# Patient Record
Sex: FEMALE | Race: WHITE | Employment: OTHER | URBAN - METROPOLITAN AREA
[De-identification: names, ages, dates, MRNs, and addresses within clinical notes are randomized per-mention and may not be internally consistent; named-entity substitution may affect disease eponyms.]

---

## 2017-01-10 RX ORDER — CARVEDILOL 25 MG/1
TABLET ORAL
Qty: 90 TABLET | Refills: 3 | Status: SHIPPED | OUTPATIENT
Start: 2017-01-10 | End: 2017-02-20 | Stop reason: SDUPTHER

## 2017-01-10 RX ORDER — SPIRONOLACTONE 25 MG/1
TABLET ORAL
Qty: 90 TABLET | Refills: 3 | Status: SHIPPED | OUTPATIENT
Start: 2017-01-10 | End: 2017-02-20 | Stop reason: SDUPTHER

## 2017-01-10 RX ORDER — LOSARTAN POTASSIUM 50 MG/1
TABLET ORAL
Qty: 180 TABLET | Refills: 3 | Status: SHIPPED | OUTPATIENT
Start: 2017-01-10 | End: 2017-02-20 | Stop reason: SDUPTHER

## 2017-01-26 ENCOUNTER — OFFICE VISIT (OUTPATIENT)
Dept: RHEUMATOLOGY | Age: 72
End: 2017-01-26

## 2017-01-26 VITALS
SYSTOLIC BLOOD PRESSURE: 120 MMHG | WEIGHT: 189.6 LBS | HEIGHT: 64 IN | BODY MASS INDEX: 32.37 KG/M2 | HEART RATE: 84 BPM | DIASTOLIC BLOOD PRESSURE: 70 MMHG

## 2017-01-26 DIAGNOSIS — L40.50 PSORIATIC ARTHRITIS (HCC): Primary | ICD-10-CM

## 2017-01-26 DIAGNOSIS — Z51.11 MAINTENANCE CHEMOTHERAPY: ICD-10-CM

## 2017-01-26 LAB
ALBUMIN SERPL-MCNC: 3.8 G/DL (ref 3.4–5)
ALP BLD-CCNC: 106 U/L (ref 40–129)
ALT SERPL-CCNC: 14 U/L (ref 10–40)
AST SERPL-CCNC: 21 U/L (ref 15–37)
BASOPHILS ABSOLUTE: 0.1 K/UL (ref 0–0.2)
BASOPHILS RELATIVE PERCENT: 1 %
BILIRUB SERPL-MCNC: 0.3 MG/DL (ref 0–1)
BILIRUBIN DIRECT: <0.2 MG/DL (ref 0–0.3)
BILIRUBIN, INDIRECT: NORMAL MG/DL (ref 0–1)
CREAT SERPL-MCNC: 0.6 MG/DL (ref 0.6–1.2)
EOSINOPHILS ABSOLUTE: 0.6 K/UL (ref 0–0.6)
EOSINOPHILS RELATIVE PERCENT: 6.4 %
GFR AFRICAN AMERICAN: >60
GFR NON-AFRICAN AMERICAN: >60
HCT VFR BLD CALC: 36.1 % (ref 36–48)
HEMOGLOBIN: 11.7 G/DL (ref 12–16)
LYMPHOCYTES ABSOLUTE: 1.7 K/UL (ref 1–5.1)
LYMPHOCYTES RELATIVE PERCENT: 18.6 %
MCH RBC QN AUTO: 33.1 PG (ref 26–34)
MCHC RBC AUTO-ENTMCNC: 32.5 G/DL (ref 31–36)
MCV RBC AUTO: 101.7 FL (ref 80–100)
MONOCYTES ABSOLUTE: 0.6 K/UL (ref 0–1.3)
MONOCYTES RELATIVE PERCENT: 7.2 %
NEUTROPHILS ABSOLUTE: 6 K/UL (ref 1.7–7.7)
NEUTROPHILS RELATIVE PERCENT: 66.8 %
PDW BLD-RTO: 15.1 % (ref 12.4–15.4)
PLATELET # BLD: 304 K/UL (ref 135–450)
PMV BLD AUTO: 8.4 FL (ref 5–10.5)
RBC # BLD: 3.55 M/UL (ref 4–5.2)
TOTAL PROTEIN: 6.5 G/DL (ref 6.4–8.2)
WBC # BLD: 8.9 K/UL (ref 4–11)

## 2017-01-26 PROCEDURE — 3017F COLORECTAL CA SCREEN DOC REV: CPT | Performed by: INTERNAL MEDICINE

## 2017-01-26 PROCEDURE — G8400 PT W/DXA NO RESULTS DOC: HCPCS | Performed by: INTERNAL MEDICINE

## 2017-01-26 PROCEDURE — 99214 OFFICE O/P EST MOD 30 MIN: CPT | Performed by: INTERNAL MEDICINE

## 2017-01-26 PROCEDURE — 1123F ACP DISCUSS/DSCN MKR DOCD: CPT | Performed by: INTERNAL MEDICINE

## 2017-01-26 PROCEDURE — G8484 FLU IMMUNIZE NO ADMIN: HCPCS | Performed by: INTERNAL MEDICINE

## 2017-01-26 PROCEDURE — 4040F PNEUMOC VAC/ADMIN/RCVD: CPT | Performed by: INTERNAL MEDICINE

## 2017-01-26 PROCEDURE — 3014F SCREEN MAMMO DOC REV: CPT | Performed by: INTERNAL MEDICINE

## 2017-01-26 PROCEDURE — G8427 DOCREV CUR MEDS BY ELIG CLIN: HCPCS | Performed by: INTERNAL MEDICINE

## 2017-01-26 PROCEDURE — 1036F TOBACCO NON-USER: CPT | Performed by: INTERNAL MEDICINE

## 2017-01-26 PROCEDURE — 1090F PRES/ABSN URINE INCON ASSESS: CPT | Performed by: INTERNAL MEDICINE

## 2017-01-26 PROCEDURE — G8419 CALC BMI OUT NRM PARAM NOF/U: HCPCS | Performed by: INTERNAL MEDICINE

## 2017-01-26 PROCEDURE — G8598 ASA/ANTIPLAT THER USED: HCPCS | Performed by: INTERNAL MEDICINE

## 2017-01-30 ENCOUNTER — HOSPITAL ENCOUNTER (OUTPATIENT)
Dept: MAMMOGRAPHY | Age: 72
Discharge: HOME | End: 2017-01-30
Attending: INTERNAL MEDICINE | Admitting: INTERNAL MEDICINE

## 2017-01-30 DIAGNOSIS — Z12.31 ENCOUNTER FOR SCREENING MAMMOGRAM FOR BREAST CANCER: ICD-10-CM

## 2017-02-20 ENCOUNTER — OFFICE VISIT (OUTPATIENT)
Dept: CARDIOLOGY CLINIC | Age: 72
End: 2017-02-20

## 2017-02-20 VITALS
DIASTOLIC BLOOD PRESSURE: 64 MMHG | OXYGEN SATURATION: 97 % | BODY MASS INDEX: 31.65 KG/M2 | HEART RATE: 70 BPM | WEIGHT: 190 LBS | HEIGHT: 65 IN | SYSTOLIC BLOOD PRESSURE: 118 MMHG

## 2017-02-20 DIAGNOSIS — I10 ESSENTIAL HYPERTENSION: ICD-10-CM

## 2017-02-20 DIAGNOSIS — E78.5 HYPERLIPIDEMIA, UNSPECIFIED HYPERLIPIDEMIA TYPE: ICD-10-CM

## 2017-02-20 DIAGNOSIS — I25.10 CORONARY ARTERY DISEASE INVOLVING NATIVE CORONARY ARTERY OF NATIVE HEART WITHOUT ANGINA PECTORIS: Primary | ICD-10-CM

## 2017-02-20 DIAGNOSIS — I42.9 CARDIOMYOPATHY (HCC): ICD-10-CM

## 2017-02-20 PROCEDURE — 99214 OFFICE O/P EST MOD 30 MIN: CPT | Performed by: INTERNAL MEDICINE

## 2017-02-20 PROCEDURE — 1123F ACP DISCUSS/DSCN MKR DOCD: CPT | Performed by: INTERNAL MEDICINE

## 2017-02-20 PROCEDURE — 3017F COLORECTAL CA SCREEN DOC REV: CPT | Performed by: INTERNAL MEDICINE

## 2017-02-20 PROCEDURE — G8400 PT W/DXA NO RESULTS DOC: HCPCS | Performed by: INTERNAL MEDICINE

## 2017-02-20 PROCEDURE — 3014F SCREEN MAMMO DOC REV: CPT | Performed by: INTERNAL MEDICINE

## 2017-02-20 PROCEDURE — G8598 ASA/ANTIPLAT THER USED: HCPCS | Performed by: INTERNAL MEDICINE

## 2017-02-20 PROCEDURE — G8484 FLU IMMUNIZE NO ADMIN: HCPCS | Performed by: INTERNAL MEDICINE

## 2017-02-20 PROCEDURE — G8427 DOCREV CUR MEDS BY ELIG CLIN: HCPCS | Performed by: INTERNAL MEDICINE

## 2017-02-20 PROCEDURE — 4040F PNEUMOC VAC/ADMIN/RCVD: CPT | Performed by: INTERNAL MEDICINE

## 2017-02-20 PROCEDURE — G8417 CALC BMI ABV UP PARAM F/U: HCPCS | Performed by: INTERNAL MEDICINE

## 2017-02-20 PROCEDURE — 1090F PRES/ABSN URINE INCON ASSESS: CPT | Performed by: INTERNAL MEDICINE

## 2017-02-20 PROCEDURE — 1036F TOBACCO NON-USER: CPT | Performed by: INTERNAL MEDICINE

## 2017-02-20 RX ORDER — GABAPENTIN 100 MG/1
100 CAPSULE ORAL 3 TIMES DAILY
Status: ON HOLD | COMMUNITY
Start: 2017-01-09 | End: 2020-02-11

## 2017-02-20 RX ORDER — CARVEDILOL 25 MG/1
25 TABLET ORAL 2 TIMES DAILY
Qty: 180 TABLET | Refills: 3 | Status: SHIPPED | OUTPATIENT
Start: 2017-02-20 | End: 2017-11-15 | Stop reason: SDUPTHER

## 2017-02-20 RX ORDER — FUROSEMIDE 40 MG/1
40 TABLET ORAL DAILY
Qty: 90 TABLET | Refills: 3 | Status: SHIPPED | OUTPATIENT
Start: 2017-02-20 | End: 2020-03-06 | Stop reason: SDUPTHER

## 2017-02-20 RX ORDER — SPIRONOLACTONE 25 MG/1
25 TABLET ORAL DAILY
Qty: 90 TABLET | Refills: 3 | Status: SHIPPED | OUTPATIENT
Start: 2017-02-20 | End: 2017-11-15 | Stop reason: SDUPTHER

## 2017-02-20 RX ORDER — LOSARTAN POTASSIUM 50 MG/1
50 TABLET ORAL 2 TIMES DAILY
Qty: 180 TABLET | Refills: 3 | Status: SHIPPED | OUTPATIENT
Start: 2017-02-20 | End: 2017-11-15 | Stop reason: SDUPTHER

## 2017-02-28 ENCOUNTER — OFFICE VISIT (OUTPATIENT)
Dept: PULMONOLOGY | Age: 72
End: 2017-02-28

## 2017-02-28 VITALS
BODY MASS INDEX: 32.12 KG/M2 | DIASTOLIC BLOOD PRESSURE: 63 MMHG | WEIGHT: 193 LBS | HEART RATE: 80 BPM | SYSTOLIC BLOOD PRESSURE: 116 MMHG

## 2017-02-28 DIAGNOSIS — I42.9 CARDIOMYOPATHY (HCC): Chronic | ICD-10-CM

## 2017-02-28 DIAGNOSIS — L40.50 PSORIATIC ARTHRITIS (HCC): ICD-10-CM

## 2017-02-28 DIAGNOSIS — J44.9 COPD, SEVERE (HCC): Primary | ICD-10-CM

## 2017-02-28 DIAGNOSIS — I10 ESSENTIAL HYPERTENSION: Chronic | ICD-10-CM

## 2017-02-28 PROCEDURE — G8417 CALC BMI ABV UP PARAM F/U: HCPCS | Performed by: INTERNAL MEDICINE

## 2017-02-28 PROCEDURE — 1123F ACP DISCUSS/DSCN MKR DOCD: CPT | Performed by: INTERNAL MEDICINE

## 2017-02-28 PROCEDURE — G8484 FLU IMMUNIZE NO ADMIN: HCPCS | Performed by: INTERNAL MEDICINE

## 2017-02-28 PROCEDURE — 1090F PRES/ABSN URINE INCON ASSESS: CPT | Performed by: INTERNAL MEDICINE

## 2017-02-28 PROCEDURE — 4040F PNEUMOC VAC/ADMIN/RCVD: CPT | Performed by: INTERNAL MEDICINE

## 2017-02-28 PROCEDURE — 3017F COLORECTAL CA SCREEN DOC REV: CPT | Performed by: INTERNAL MEDICINE

## 2017-02-28 PROCEDURE — 1036F TOBACCO NON-USER: CPT | Performed by: INTERNAL MEDICINE

## 2017-02-28 PROCEDURE — 3014F SCREEN MAMMO DOC REV: CPT | Performed by: INTERNAL MEDICINE

## 2017-02-28 PROCEDURE — 3023F SPIROM DOC REV: CPT | Performed by: INTERNAL MEDICINE

## 2017-02-28 PROCEDURE — G8427 DOCREV CUR MEDS BY ELIG CLIN: HCPCS | Performed by: INTERNAL MEDICINE

## 2017-02-28 PROCEDURE — G8598 ASA/ANTIPLAT THER USED: HCPCS | Performed by: INTERNAL MEDICINE

## 2017-02-28 PROCEDURE — G8926 SPIRO NO PERF OR DOC: HCPCS | Performed by: INTERNAL MEDICINE

## 2017-02-28 PROCEDURE — G8400 PT W/DXA NO RESULTS DOC: HCPCS | Performed by: INTERNAL MEDICINE

## 2017-02-28 PROCEDURE — 99214 OFFICE O/P EST MOD 30 MIN: CPT | Performed by: INTERNAL MEDICINE

## 2017-04-04 ENCOUNTER — OFFICE VISIT (OUTPATIENT)
Dept: PULMONOLOGY | Age: 72
End: 2017-04-04

## 2017-04-04 VITALS
WEIGHT: 188 LBS | DIASTOLIC BLOOD PRESSURE: 72 MMHG | HEART RATE: 95 BPM | BODY MASS INDEX: 31.28 KG/M2 | SYSTOLIC BLOOD PRESSURE: 118 MMHG

## 2017-04-04 DIAGNOSIS — R06.02 SOB (SHORTNESS OF BREATH): ICD-10-CM

## 2017-04-04 DIAGNOSIS — I42.9 CARDIOMYOPATHY (HCC): Chronic | ICD-10-CM

## 2017-04-04 DIAGNOSIS — J44.9 COPD, SEVERE (HCC): Primary | ICD-10-CM

## 2017-04-04 DIAGNOSIS — R05.9 COUGH: ICD-10-CM

## 2017-04-04 DIAGNOSIS — I10 ESSENTIAL HYPERTENSION: Chronic | ICD-10-CM

## 2017-04-04 PROCEDURE — G8400 PT W/DXA NO RESULTS DOC: HCPCS | Performed by: INTERNAL MEDICINE

## 2017-04-04 PROCEDURE — 3017F COLORECTAL CA SCREEN DOC REV: CPT | Performed by: INTERNAL MEDICINE

## 2017-04-04 PROCEDURE — 99214 OFFICE O/P EST MOD 30 MIN: CPT | Performed by: INTERNAL MEDICINE

## 2017-04-04 PROCEDURE — 1123F ACP DISCUSS/DSCN MKR DOCD: CPT | Performed by: INTERNAL MEDICINE

## 2017-04-04 PROCEDURE — 1090F PRES/ABSN URINE INCON ASSESS: CPT | Performed by: INTERNAL MEDICINE

## 2017-04-04 PROCEDURE — G8598 ASA/ANTIPLAT THER USED: HCPCS | Performed by: INTERNAL MEDICINE

## 2017-04-04 PROCEDURE — 3014F SCREEN MAMMO DOC REV: CPT | Performed by: INTERNAL MEDICINE

## 2017-04-04 PROCEDURE — 4040F PNEUMOC VAC/ADMIN/RCVD: CPT | Performed by: INTERNAL MEDICINE

## 2017-04-04 PROCEDURE — G8417 CALC BMI ABV UP PARAM F/U: HCPCS | Performed by: INTERNAL MEDICINE

## 2017-04-04 PROCEDURE — G8926 SPIRO NO PERF OR DOC: HCPCS | Performed by: INTERNAL MEDICINE

## 2017-04-04 PROCEDURE — 3023F SPIROM DOC REV: CPT | Performed by: INTERNAL MEDICINE

## 2017-04-04 PROCEDURE — 1036F TOBACCO NON-USER: CPT | Performed by: INTERNAL MEDICINE

## 2017-04-04 PROCEDURE — G8427 DOCREV CUR MEDS BY ELIG CLIN: HCPCS | Performed by: INTERNAL MEDICINE

## 2017-04-04 RX ORDER — PREDNISONE 10 MG/1
TABLET ORAL
Qty: 30 TABLET | Refills: 0 | Status: SHIPPED | OUTPATIENT
Start: 2017-04-04 | End: 2017-04-14

## 2017-04-04 RX ORDER — DOXYCYCLINE HYCLATE 100 MG/1
100 CAPSULE ORAL DAILY
Qty: 10 CAPSULE | Refills: 3 | Status: SHIPPED | OUTPATIENT
Start: 2017-04-04 | End: 2017-04-14

## 2017-04-28 ENCOUNTER — TELEPHONE (OUTPATIENT)
Dept: PULMONOLOGY | Age: 72
End: 2017-04-28

## 2017-04-28 DIAGNOSIS — R05.9 COUGH: Primary | ICD-10-CM

## 2017-04-29 ENCOUNTER — HOSPITAL ENCOUNTER (OUTPATIENT)
Dept: OTHER | Age: 72
Discharge: HOME | End: 2017-04-29
Attending: INTERNAL MEDICINE | Admitting: INTERNAL MEDICINE

## 2017-04-29 DIAGNOSIS — R05.9 COUGH: ICD-10-CM

## 2017-05-01 ENCOUNTER — TELEPHONE (OUTPATIENT)
Dept: INTERNAL MEDICINE CLINIC | Age: 72
End: 2017-05-01

## 2017-05-22 ENCOUNTER — OFFICE VISIT (OUTPATIENT)
Dept: RHEUMATOLOGY | Age: 72
End: 2017-05-22

## 2017-05-22 VITALS
DIASTOLIC BLOOD PRESSURE: 76 MMHG | BODY MASS INDEX: 30.68 KG/M2 | WEIGHT: 184.13 LBS | SYSTOLIC BLOOD PRESSURE: 124 MMHG | HEART RATE: 88 BPM | HEIGHT: 65 IN

## 2017-05-22 DIAGNOSIS — L40.50 PSORIATIC ARTHRITIS (HCC): Primary | ICD-10-CM

## 2017-05-22 DIAGNOSIS — Z51.81 ENCOUNTER FOR THERAPEUTIC DRUG MONITORING: ICD-10-CM

## 2017-05-22 DIAGNOSIS — Z79.899 ENCOUNTER FOR LONG-TERM (CURRENT) USE OF HIGH-RISK MEDICATION: ICD-10-CM

## 2017-05-22 LAB
ALBUMIN SERPL-MCNC: 3.9 G/DL (ref 3.4–5)
ALP BLD-CCNC: 83 U/L (ref 40–129)
ALT SERPL-CCNC: 14 U/L (ref 10–40)
AST SERPL-CCNC: 24 U/L (ref 15–37)
BASOPHILS ABSOLUTE: 0.1 K/UL (ref 0–0.2)
BASOPHILS RELATIVE PERCENT: 0.6 %
BILIRUB SERPL-MCNC: 0.6 MG/DL (ref 0–1)
BILIRUBIN DIRECT: <0.2 MG/DL (ref 0–0.3)
BILIRUBIN, INDIRECT: NORMAL MG/DL (ref 0–1)
CREAT SERPL-MCNC: 0.6 MG/DL (ref 0.6–1.2)
EOSINOPHILS ABSOLUTE: 0.2 K/UL (ref 0–0.6)
EOSINOPHILS RELATIVE PERCENT: 2.6 %
GFR AFRICAN AMERICAN: >60
GFR NON-AFRICAN AMERICAN: >60
HCT VFR BLD CALC: 36.8 % (ref 36–48)
HEMOGLOBIN: 12 G/DL (ref 12–16)
LYMPHOCYTES ABSOLUTE: 1.3 K/UL (ref 1–5.1)
LYMPHOCYTES RELATIVE PERCENT: 13.2 %
MCH RBC QN AUTO: 32.9 PG (ref 26–34)
MCHC RBC AUTO-ENTMCNC: 32.8 G/DL (ref 31–36)
MCV RBC AUTO: 100.6 FL (ref 80–100)
MONOCYTES ABSOLUTE: 0.5 K/UL (ref 0–1.3)
MONOCYTES RELATIVE PERCENT: 5.1 %
NEUTROPHILS ABSOLUTE: 7.4 K/UL (ref 1.7–7.7)
NEUTROPHILS RELATIVE PERCENT: 78.5 %
PDW BLD-RTO: 16.2 % (ref 12.4–15.4)
PLATELET # BLD: 264 K/UL (ref 135–450)
PMV BLD AUTO: 8.5 FL (ref 5–10.5)
RBC # BLD: 3.66 M/UL (ref 4–5.2)
TOTAL PROTEIN: 6.7 G/DL (ref 6.4–8.2)
WBC # BLD: 9.5 K/UL (ref 4–11)

## 2017-05-22 PROCEDURE — 1123F ACP DISCUSS/DSCN MKR DOCD: CPT | Performed by: INTERNAL MEDICINE

## 2017-05-22 PROCEDURE — 1036F TOBACCO NON-USER: CPT | Performed by: INTERNAL MEDICINE

## 2017-05-22 PROCEDURE — 1090F PRES/ABSN URINE INCON ASSESS: CPT | Performed by: INTERNAL MEDICINE

## 2017-05-22 PROCEDURE — 3014F SCREEN MAMMO DOC REV: CPT | Performed by: INTERNAL MEDICINE

## 2017-05-22 PROCEDURE — 99214 OFFICE O/P EST MOD 30 MIN: CPT | Performed by: INTERNAL MEDICINE

## 2017-05-22 PROCEDURE — 3017F COLORECTAL CA SCREEN DOC REV: CPT | Performed by: INTERNAL MEDICINE

## 2017-05-22 PROCEDURE — G8427 DOCREV CUR MEDS BY ELIG CLIN: HCPCS | Performed by: INTERNAL MEDICINE

## 2017-05-22 PROCEDURE — G8400 PT W/DXA NO RESULTS DOC: HCPCS | Performed by: INTERNAL MEDICINE

## 2017-05-22 PROCEDURE — G8598 ASA/ANTIPLAT THER USED: HCPCS | Performed by: INTERNAL MEDICINE

## 2017-05-22 PROCEDURE — G8417 CALC BMI ABV UP PARAM F/U: HCPCS | Performed by: INTERNAL MEDICINE

## 2017-05-22 PROCEDURE — 4040F PNEUMOC VAC/ADMIN/RCVD: CPT | Performed by: INTERNAL MEDICINE

## 2017-08-23 ENCOUNTER — OFFICE VISIT (OUTPATIENT)
Dept: RHEUMATOLOGY | Age: 72
End: 2017-08-23

## 2017-08-23 VITALS
SYSTOLIC BLOOD PRESSURE: 110 MMHG | DIASTOLIC BLOOD PRESSURE: 64 MMHG | WEIGHT: 178.5 LBS | HEART RATE: 72 BPM | BODY MASS INDEX: 29.74 KG/M2 | HEIGHT: 65 IN

## 2017-08-23 DIAGNOSIS — Z79.899 ENCOUNTER FOR LONG-TERM (CURRENT) USE OF HIGH-RISK MEDICATION: ICD-10-CM

## 2017-08-23 DIAGNOSIS — Z51.81 ENCOUNTER FOR THERAPEUTIC DRUG MONITORING: ICD-10-CM

## 2017-08-23 DIAGNOSIS — L40.50 PSORIATIC ARTHRITIS (HCC): Primary | ICD-10-CM

## 2017-08-23 LAB
ALBUMIN SERPL-MCNC: 3.9 G/DL (ref 3.4–5)
ALP BLD-CCNC: 88 U/L (ref 40–129)
ALT SERPL-CCNC: 14 U/L (ref 10–40)
AST SERPL-CCNC: 24 U/L (ref 15–37)
BASOPHILS ABSOLUTE: 0 K/UL (ref 0–0.2)
BASOPHILS RELATIVE PERCENT: 0.5 %
BILIRUB SERPL-MCNC: 0.4 MG/DL (ref 0–1)
BILIRUBIN DIRECT: <0.2 MG/DL (ref 0–0.3)
BILIRUBIN, INDIRECT: NORMAL MG/DL (ref 0–1)
CREAT SERPL-MCNC: 0.6 MG/DL (ref 0.6–1.2)
EOSINOPHILS ABSOLUTE: 0.5 K/UL (ref 0–0.6)
EOSINOPHILS RELATIVE PERCENT: 6.7 %
GFR AFRICAN AMERICAN: >60
GFR NON-AFRICAN AMERICAN: >60
HCT VFR BLD CALC: 36.5 % (ref 36–48)
HEMOGLOBIN: 12 G/DL (ref 12–16)
LYMPHOCYTES ABSOLUTE: 1.8 K/UL (ref 1–5.1)
LYMPHOCYTES RELATIVE PERCENT: 21.7 %
MCH RBC QN AUTO: 33.7 PG (ref 26–34)
MCHC RBC AUTO-ENTMCNC: 32.9 G/DL (ref 31–36)
MCV RBC AUTO: 102.6 FL (ref 80–100)
MONOCYTES ABSOLUTE: 0.4 K/UL (ref 0–1.3)
MONOCYTES RELATIVE PERCENT: 4.7 %
NEUTROPHILS ABSOLUTE: 5.4 K/UL (ref 1.7–7.7)
NEUTROPHILS RELATIVE PERCENT: 66.4 %
PDW BLD-RTO: 16 % (ref 12.4–15.4)
PLATELET # BLD: 303 K/UL (ref 135–450)
PMV BLD AUTO: 8.7 FL (ref 5–10.5)
RBC # BLD: 3.56 M/UL (ref 4–5.2)
TOTAL PROTEIN: 6.6 G/DL (ref 6.4–8.2)
WBC # BLD: 8.1 K/UL (ref 4–11)

## 2017-08-23 PROCEDURE — 3014F SCREEN MAMMO DOC REV: CPT | Performed by: INTERNAL MEDICINE

## 2017-08-23 PROCEDURE — 3017F COLORECTAL CA SCREEN DOC REV: CPT | Performed by: INTERNAL MEDICINE

## 2017-08-23 PROCEDURE — 1123F ACP DISCUSS/DSCN MKR DOCD: CPT | Performed by: INTERNAL MEDICINE

## 2017-08-23 PROCEDURE — 1090F PRES/ABSN URINE INCON ASSESS: CPT | Performed by: INTERNAL MEDICINE

## 2017-08-23 PROCEDURE — 4040F PNEUMOC VAC/ADMIN/RCVD: CPT | Performed by: INTERNAL MEDICINE

## 2017-08-23 PROCEDURE — G8400 PT W/DXA NO RESULTS DOC: HCPCS | Performed by: INTERNAL MEDICINE

## 2017-08-23 PROCEDURE — G8427 DOCREV CUR MEDS BY ELIG CLIN: HCPCS | Performed by: INTERNAL MEDICINE

## 2017-08-23 PROCEDURE — 99214 OFFICE O/P EST MOD 30 MIN: CPT | Performed by: INTERNAL MEDICINE

## 2017-08-23 PROCEDURE — G8598 ASA/ANTIPLAT THER USED: HCPCS | Performed by: INTERNAL MEDICINE

## 2017-08-23 PROCEDURE — 1036F TOBACCO NON-USER: CPT | Performed by: INTERNAL MEDICINE

## 2017-08-23 PROCEDURE — G8417 CALC BMI ABV UP PARAM F/U: HCPCS | Performed by: INTERNAL MEDICINE

## 2017-08-28 ENCOUNTER — OFFICE VISIT (OUTPATIENT)
Dept: PULMONOLOGY | Age: 72
End: 2017-08-28

## 2017-08-28 VITALS
DIASTOLIC BLOOD PRESSURE: 72 MMHG | WEIGHT: 181 LBS | SYSTOLIC BLOOD PRESSURE: 127 MMHG | BODY MASS INDEX: 30.12 KG/M2 | HEART RATE: 78 BPM

## 2017-08-28 DIAGNOSIS — R05.9 COUGH: ICD-10-CM

## 2017-08-28 DIAGNOSIS — R06.02 SOB (SHORTNESS OF BREATH): Primary | ICD-10-CM

## 2017-08-28 DIAGNOSIS — J44.9 COPD, SEVERE (HCC): ICD-10-CM

## 2017-08-28 DIAGNOSIS — I42.9 CARDIOMYOPATHY, UNSPECIFIED TYPE (HCC): Chronic | ICD-10-CM

## 2017-08-28 PROCEDURE — 1090F PRES/ABSN URINE INCON ASSESS: CPT | Performed by: INTERNAL MEDICINE

## 2017-08-28 PROCEDURE — G8427 DOCREV CUR MEDS BY ELIG CLIN: HCPCS | Performed by: INTERNAL MEDICINE

## 2017-08-28 PROCEDURE — 4040F PNEUMOC VAC/ADMIN/RCVD: CPT | Performed by: INTERNAL MEDICINE

## 2017-08-28 PROCEDURE — 3017F COLORECTAL CA SCREEN DOC REV: CPT | Performed by: INTERNAL MEDICINE

## 2017-08-28 PROCEDURE — 1123F ACP DISCUSS/DSCN MKR DOCD: CPT | Performed by: INTERNAL MEDICINE

## 2017-08-28 PROCEDURE — 3023F SPIROM DOC REV: CPT | Performed by: INTERNAL MEDICINE

## 2017-08-28 PROCEDURE — G8926 SPIRO NO PERF OR DOC: HCPCS | Performed by: INTERNAL MEDICINE

## 2017-08-28 PROCEDURE — G8417 CALC BMI ABV UP PARAM F/U: HCPCS | Performed by: INTERNAL MEDICINE

## 2017-08-28 PROCEDURE — 99214 OFFICE O/P EST MOD 30 MIN: CPT | Performed by: INTERNAL MEDICINE

## 2017-08-28 PROCEDURE — G8598 ASA/ANTIPLAT THER USED: HCPCS | Performed by: INTERNAL MEDICINE

## 2017-08-28 PROCEDURE — 1036F TOBACCO NON-USER: CPT | Performed by: INTERNAL MEDICINE

## 2017-08-28 PROCEDURE — 3014F SCREEN MAMMO DOC REV: CPT | Performed by: INTERNAL MEDICINE

## 2017-08-28 PROCEDURE — G8400 PT W/DXA NO RESULTS DOC: HCPCS | Performed by: INTERNAL MEDICINE

## 2017-08-28 RX ORDER — VENLAFAXINE HYDROCHLORIDE 75 MG/1
1 CAPSULE, EXTENDED RELEASE ORAL DAILY
COMMUNITY
Start: 2017-08-02 | End: 2019-03-12 | Stop reason: SDUPTHER

## 2017-10-11 ENCOUNTER — TELEPHONE (OUTPATIENT)
Dept: INTERNAL MEDICINE CLINIC | Age: 72
End: 2017-10-11

## 2017-10-11 ENCOUNTER — OFFICE VISIT (OUTPATIENT)
Dept: RHEUMATOLOGY | Age: 72
End: 2017-10-11

## 2017-10-11 VITALS
HEART RATE: 84 BPM | HEIGHT: 65 IN | SYSTOLIC BLOOD PRESSURE: 110 MMHG | WEIGHT: 181 LBS | BODY MASS INDEX: 30.16 KG/M2 | DIASTOLIC BLOOD PRESSURE: 68 MMHG

## 2017-10-11 DIAGNOSIS — M65.9 FLEXOR TENOSYNOVITIS OF FINGER: Primary | ICD-10-CM

## 2017-10-11 DIAGNOSIS — L40.50 PSORIATIC ARTHRITIS (HCC): ICD-10-CM

## 2017-10-11 PROCEDURE — 1036F TOBACCO NON-USER: CPT | Performed by: INTERNAL MEDICINE

## 2017-10-11 PROCEDURE — 20550 NJX 1 TENDON SHEATH/LIGAMENT: CPT | Performed by: INTERNAL MEDICINE

## 2017-10-11 RX ORDER — TRIAMCINOLONE ACETONIDE 40 MG/ML
20 INJECTION, SUSPENSION INTRA-ARTICULAR; INTRAMUSCULAR ONCE
Status: COMPLETED | OUTPATIENT
Start: 2017-10-11 | End: 2017-10-11

## 2017-10-11 RX ADMIN — TRIAMCINOLONE ACETONIDE 20 MG: 40 INJECTION, SUSPENSION INTRA-ARTICULAR; INTRAMUSCULAR at 12:14

## 2017-10-11 NOTE — TELEPHONE ENCOUNTER
Pt is calling stating she was suppose to call if her finger started locking up again. She would like to come in asap to get an injection in her ring finger. She is wanting to come soon.

## 2017-11-15 DIAGNOSIS — I25.10 CORONARY ARTERY DISEASE INVOLVING NATIVE CORONARY ARTERY OF NATIVE HEART WITHOUT ANGINA PECTORIS: ICD-10-CM

## 2017-11-15 DIAGNOSIS — I10 ESSENTIAL HYPERTENSION: ICD-10-CM

## 2017-11-15 DIAGNOSIS — I42.9 CARDIOMYOPATHY (HCC): ICD-10-CM

## 2017-11-16 RX ORDER — LOSARTAN POTASSIUM 50 MG/1
TABLET ORAL
Qty: 180 TABLET | Refills: 2 | Status: SHIPPED | OUTPATIENT
Start: 2017-11-16 | End: 2018-03-07 | Stop reason: SDUPTHER

## 2017-11-16 RX ORDER — SPIRONOLACTONE 25 MG/1
25 TABLET ORAL DAILY
Qty: 90 TABLET | Refills: 3 | Status: SHIPPED | OUTPATIENT
Start: 2017-11-16 | End: 2018-03-07 | Stop reason: SDUPTHER

## 2017-11-16 RX ORDER — CARVEDILOL 25 MG/1
TABLET ORAL
Qty: 90 TABLET | Refills: 3 | Status: SHIPPED | OUTPATIENT
Start: 2017-11-16 | End: 2018-03-07 | Stop reason: SDUPTHER

## 2017-11-27 ENCOUNTER — OFFICE VISIT (OUTPATIENT)
Dept: RHEUMATOLOGY | Age: 72
End: 2017-11-27

## 2017-11-27 VITALS
DIASTOLIC BLOOD PRESSURE: 62 MMHG | SYSTOLIC BLOOD PRESSURE: 110 MMHG | HEIGHT: 65 IN | HEART RATE: 80 BPM | WEIGHT: 182.38 LBS | BODY MASS INDEX: 30.39 KG/M2

## 2017-11-27 DIAGNOSIS — L40.50 PSORIATIC ARTHRITIS (HCC): Primary | ICD-10-CM

## 2017-11-27 DIAGNOSIS — Z51.81 ENCOUNTER FOR THERAPEUTIC DRUG MONITORING: ICD-10-CM

## 2017-11-27 DIAGNOSIS — Z79.899 ENCOUNTER FOR LONG-TERM (CURRENT) USE OF HIGH-RISK MEDICATION: ICD-10-CM

## 2017-11-27 LAB
ALBUMIN SERPL-MCNC: 4 G/DL (ref 3.4–5)
ALP BLD-CCNC: 83 U/L (ref 40–129)
ALT SERPL-CCNC: 12 U/L (ref 10–40)
AST SERPL-CCNC: 20 U/L (ref 15–37)
BASOPHILS ABSOLUTE: 0 K/UL (ref 0–0.2)
BASOPHILS RELATIVE PERCENT: 0.4 %
BILIRUB SERPL-MCNC: 0.6 MG/DL (ref 0–1)
BILIRUBIN DIRECT: <0.2 MG/DL (ref 0–0.3)
BILIRUBIN, INDIRECT: NORMAL MG/DL (ref 0–1)
CREAT SERPL-MCNC: 0.6 MG/DL (ref 0.6–1.2)
EOSINOPHILS ABSOLUTE: 0.2 K/UL (ref 0–0.6)
EOSINOPHILS RELATIVE PERCENT: 2.8 %
GFR AFRICAN AMERICAN: >60
GFR NON-AFRICAN AMERICAN: >60
HCT VFR BLD CALC: 37.9 % (ref 36–48)
HEMOGLOBIN: 12.2 G/DL (ref 12–16)
LYMPHOCYTES ABSOLUTE: 1.2 K/UL (ref 1–5.1)
LYMPHOCYTES RELATIVE PERCENT: 15.9 %
MCH RBC QN AUTO: 33.7 PG (ref 26–34)
MCHC RBC AUTO-ENTMCNC: 32.1 G/DL (ref 31–36)
MCV RBC AUTO: 104.8 FL (ref 80–100)
MONOCYTES ABSOLUTE: 0.5 K/UL (ref 0–1.3)
MONOCYTES RELATIVE PERCENT: 6.4 %
NEUTROPHILS ABSOLUTE: 5.5 K/UL (ref 1.7–7.7)
NEUTROPHILS RELATIVE PERCENT: 74.5 %
PDW BLD-RTO: 16.1 % (ref 12.4–15.4)
PLATELET # BLD: 287 K/UL (ref 135–450)
PMV BLD AUTO: 8.4 FL (ref 5–10.5)
RBC # BLD: 3.62 M/UL (ref 4–5.2)
TOTAL PROTEIN: 6.5 G/DL (ref 6.4–8.2)
WBC # BLD: 7.4 K/UL (ref 4–11)

## 2017-11-27 PROCEDURE — 4040F PNEUMOC VAC/ADMIN/RCVD: CPT | Performed by: INTERNAL MEDICINE

## 2017-11-27 PROCEDURE — G8598 ASA/ANTIPLAT THER USED: HCPCS | Performed by: INTERNAL MEDICINE

## 2017-11-27 PROCEDURE — G8482 FLU IMMUNIZE ORDER/ADMIN: HCPCS | Performed by: INTERNAL MEDICINE

## 2017-11-27 PROCEDURE — 1123F ACP DISCUSS/DSCN MKR DOCD: CPT | Performed by: INTERNAL MEDICINE

## 2017-11-27 PROCEDURE — G8427 DOCREV CUR MEDS BY ELIG CLIN: HCPCS | Performed by: INTERNAL MEDICINE

## 2017-11-27 PROCEDURE — 3017F COLORECTAL CA SCREEN DOC REV: CPT | Performed by: INTERNAL MEDICINE

## 2017-11-27 PROCEDURE — 1036F TOBACCO NON-USER: CPT | Performed by: INTERNAL MEDICINE

## 2017-11-27 PROCEDURE — 3014F SCREEN MAMMO DOC REV: CPT | Performed by: INTERNAL MEDICINE

## 2017-11-27 PROCEDURE — G8400 PT W/DXA NO RESULTS DOC: HCPCS | Performed by: INTERNAL MEDICINE

## 2017-11-27 PROCEDURE — 1090F PRES/ABSN URINE INCON ASSESS: CPT | Performed by: INTERNAL MEDICINE

## 2017-11-27 PROCEDURE — 99214 OFFICE O/P EST MOD 30 MIN: CPT | Performed by: INTERNAL MEDICINE

## 2017-11-27 PROCEDURE — G8417 CALC BMI ABV UP PARAM F/U: HCPCS | Performed by: INTERNAL MEDICINE

## 2017-11-27 NOTE — PROGRESS NOTES
SUBJECTIVE:    Patient ID: Naldo White is a 67 y.o. female. The patient returns for follow-up of psoriatic arthritis. The injection she received along her flexor tendon last visit was quite helpful. She continues on methotrexate 20 mg weekly. Review of Systems:    Respiratory: denies cough, denies shortness of breath  Gastrointestinal: denies abdominal pain, denies nausea  Musculoskeletal:                 Less than 5 minutes        Morning stiffness  Ears, nose, mouth: denies mucosal sores  Skin: Minimal psoriasis on the hands, denies alopecia. The remainder of her review of systems is negative. Prior to Visit Medications    Medication Sig Taking? Authorizing Provider   carvedilol (COREG) 25 MG tablet TAKE ONE-HALF TABLET BY  MOUTH TWICE A DAY Yes Sia Martinez MD   losartan (COZAAR) 50 MG tablet TAKE 1 TABLET BY MOUTH TWO  TIMES DAILY Yes Sia Martinez MD   spironolactone (ALDACTONE) 25 MG tablet TAKE 1 TABLET BY MOUTH  DAILY Yes Sia Martinez MD   metFORMIN (GLUCOPHAGE) 1000 MG tablet Take 1 tablet by mouth 2 times daily Yes Historical Provider, MD   venlafaxine (EFFEXOR XR) 75 MG extended release capsule Take 1 capsule by mouth daily Yes Historical Provider, MD   methotrexate (RHEUMATREX) 2.5 MG chemo tablet Take 8 tablets by mouth  once a week Yes Dontae Corrales MD   gabapentin (NEURONTIN) 100 MG capsule Take 100 mg by mouth 3 times daily Yes Historical Provider, MD   furosemide (LASIX) 40 MG tablet Take 1 tablet by mouth daily Yes Sia Martinez MD   umeclidinium-vilanterol (ANORO ELLIPTA) 62.5-25 MCG/INH AEPB inhaler Inhale 1 puff into the lungs daily Yes Go Cameron MD   folic acid (FOLVITE) 1 MG tablet Take 1 mg by mouth daily Yes Historical Provider, MD   aspirin 81 MG tablet Take 81 mg by mouth every other day  Yes Historical Provider, MD   HYDROcodone-acetaminophen (NORCO) 5-325 MG per tablet Take 1 tablet by mouth every 6 hours as needed for Pain.  Yes Historical Provider, MD venlafaxine (EFFEXOR) 75 MG tablet Take 75 mg by mouth daily. Yes Historical Provider, MD   diclofenac sodium 1 % GEL Apply 4 g topically 4 times daily. Yes Moustapha Kelly MD   Insulin Glargine (LANTUS SC) Inject 25 Units into the skin 2 times daily. Yes Historical Provider, MD   Insulin Aspart (NOVOLOG FLEXPEN SC) Inject  into the skin. Yes Historical Provider, MD   levothyroxine (SYNTHROID) 100 MCG tablet Take 75 mcg by mouth daily  Yes Historical Provider, MD   estrogens, conjugated, (PREMARIN) 0.625 MG tablet Take 0.625 mg by mouth every 72 hours. Yes Historical Provider, MD   calcium carbonate (OSCAL) 500 MG TABS tablet Take 500 mg by mouth daily. Yes Historical Provider, MD   perphenazine 2 MG tablet Take 2 mg by mouth nightly. Yes Historical Provider, MD        OBJECTIVE:  /62   Pulse 80   Ht 5' 5\" (1.651 m)   Wt 182 lb 6 oz (82.7 kg)   BMI 30.35 kg/m²      Physical Exam:    General: the patient demonstrated normal gait and posture without evidence of overt muscle wasting. Hygiene appears normal    Ears, mouth, nose, throat: no mucosal sores      Respiratory: assessment of the patient's respiratory effort showed no evidence of abnormal respiration and no use of accessory muscles. Diaphragmatic movement is normal.  Percussion of the patient's chest showed no evidence of dullness flatness or hyperresonance. Auscultation of the patient's lungs reveal normal breath sounds in all lung fields. There is no evidence of abnormal sounds such as rales or wheezes. There is no evidence of friction rub. Cardiovascular: palpation of the patient's chest revealed no abnormal thrill. The point of maximal impulse showed no displacement. Auscultation of the heart revealed no evidence of murmur gallop or abnormal heart sound.     Musculoskeletal: Soft tissue swelling first MCP on the left soft tissue swelling 50 on the left flexion contracture third PIP on the right no swelling wrists no swelling

## 2018-02-27 ENCOUNTER — OFFICE VISIT (OUTPATIENT)
Dept: RHEUMATOLOGY | Age: 73
End: 2018-02-27

## 2018-02-27 VITALS
HEART RATE: 84 BPM | BODY MASS INDEX: 29.89 KG/M2 | DIASTOLIC BLOOD PRESSURE: 64 MMHG | HEIGHT: 65 IN | WEIGHT: 179.38 LBS | SYSTOLIC BLOOD PRESSURE: 108 MMHG

## 2018-02-27 DIAGNOSIS — Z79.899 ENCOUNTER FOR LONG-TERM (CURRENT) USE OF HIGH-RISK MEDICATION: ICD-10-CM

## 2018-02-27 DIAGNOSIS — L40.50 PSORIATIC ARTHRITIS (HCC): Primary | ICD-10-CM

## 2018-02-27 DIAGNOSIS — Z51.81 ENCOUNTER FOR THERAPEUTIC DRUG MONITORING: ICD-10-CM

## 2018-02-27 LAB
ALBUMIN SERPL-MCNC: 4.2 G/DL (ref 3.4–5)
ALP BLD-CCNC: 91 U/L (ref 40–129)
ALT SERPL-CCNC: 15 U/L (ref 10–40)
AST SERPL-CCNC: 21 U/L (ref 15–37)
BILIRUB SERPL-MCNC: 0.8 MG/DL (ref 0–1)
BILIRUBIN DIRECT: <0.2 MG/DL (ref 0–0.3)
BILIRUBIN, INDIRECT: NORMAL MG/DL (ref 0–1)
CREAT SERPL-MCNC: 0.6 MG/DL (ref 0.6–1.2)
GFR AFRICAN AMERICAN: >60
GFR NON-AFRICAN AMERICAN: >60
TOTAL PROTEIN: 6.9 G/DL (ref 6.4–8.2)

## 2018-02-27 PROCEDURE — G8417 CALC BMI ABV UP PARAM F/U: HCPCS | Performed by: INTERNAL MEDICINE

## 2018-02-27 PROCEDURE — 4040F PNEUMOC VAC/ADMIN/RCVD: CPT | Performed by: INTERNAL MEDICINE

## 2018-02-27 PROCEDURE — G8598 ASA/ANTIPLAT THER USED: HCPCS | Performed by: INTERNAL MEDICINE

## 2018-02-27 PROCEDURE — 1036F TOBACCO NON-USER: CPT | Performed by: INTERNAL MEDICINE

## 2018-02-27 PROCEDURE — G8482 FLU IMMUNIZE ORDER/ADMIN: HCPCS | Performed by: INTERNAL MEDICINE

## 2018-02-27 PROCEDURE — G8427 DOCREV CUR MEDS BY ELIG CLIN: HCPCS | Performed by: INTERNAL MEDICINE

## 2018-02-27 PROCEDURE — 1090F PRES/ABSN URINE INCON ASSESS: CPT | Performed by: INTERNAL MEDICINE

## 2018-02-27 PROCEDURE — 1123F ACP DISCUSS/DSCN MKR DOCD: CPT | Performed by: INTERNAL MEDICINE

## 2018-02-27 PROCEDURE — 99214 OFFICE O/P EST MOD 30 MIN: CPT | Performed by: INTERNAL MEDICINE

## 2018-02-27 PROCEDURE — 3017F COLORECTAL CA SCREEN DOC REV: CPT | Performed by: INTERNAL MEDICINE

## 2018-02-27 PROCEDURE — 3014F SCREEN MAMMO DOC REV: CPT | Performed by: INTERNAL MEDICINE

## 2018-02-27 PROCEDURE — G8400 PT W/DXA NO RESULTS DOC: HCPCS | Performed by: INTERNAL MEDICINE

## 2018-02-27 NOTE — PROGRESS NOTES
hours as needed for Pain. Yes Historical Provider, MD   venlafaxine (EFFEXOR) 75 MG tablet Take 75 mg by mouth daily. Yes Historical Provider, MD   diclofenac sodium 1 % GEL Apply 4 g topically 4 times daily. Yes Becca Rapp MD   Insulin Glargine (LANTUS SC) Inject 25 Units into the skin 2 times daily. Yes Historical Provider, MD   Insulin Aspart (NOVOLOG FLEXPEN SC) Inject  into the skin. Yes Historical Provider, MD   levothyroxine (SYNTHROID) 100 MCG tablet Take 75 mcg by mouth daily  Yes Historical Provider, MD   estrogens, conjugated, (PREMARIN) 0.625 MG tablet Take 0.625 mg by mouth every 72 hours. Yes Historical Provider, MD   calcium carbonate (OSCAL) 500 MG TABS tablet Take 500 mg by mouth daily. Yes Historical Provider, MD   perphenazine 2 MG tablet Take 2 mg by mouth nightly. Yes Historical Provider, MD        OBJECTIVE:  /64   Pulse 84   Ht 5' 5\" (1.651 m)   Wt 179 lb 6 oz (81.4 kg)   BMI 29.85 kg/m²      Physical Exam:    General: the patient demonstrated normal gait and posture without evidence of overt muscle wasting. Hygiene appears normal    Ears, mouth, nose, throat: no mucosal sores      Respiratory: assessment of the patient's respiratory effort showed no evidence of abnormal respiration and no use of accessory muscles. Diaphragmatic movement is normal.  Percussion of the patient's chest showed no evidence of dullness flatness or hyperresonance. Auscultation of the patient's lungs reveal normal breath sounds in all lung fields. There is no evidence of abnormal sounds such as rales or wheezes. There is no evidence of friction rub. Cardiovascular: palpation of the patient's chest revealed no abnormal thrill. The point of maximal impulse showed no displacement. Auscultation of the heart revealed no evidence of murmur gallop or abnormal heart sound. Musculoskeletal: Trace swelling wrists no swelling PIPs no swelling elbows equivocal swelling knees.   Fists 100%  Skin: no visible psoriasis    ASSESSMENT: Psoriatic arthritis. Chemotherapy        PLAN: The patient will reduce methotrexate to 15 mg a week. Blood work will be obtained today to monitor for adverse drug reactions and to screen for tuberculosis. She'll review literature on Enbrel and Humira and she will call the office week or 2 if she wants to proceed with a TNF inhibitor. I'll see her back in 3 months time.

## 2018-02-28 LAB
BASOPHILS ABSOLUTE: 0 K/UL (ref 0–0.2)
BASOPHILS RELATIVE PERCENT: 0.6 %
EOSINOPHILS ABSOLUTE: 0.3 K/UL (ref 0–0.6)
EOSINOPHILS RELATIVE PERCENT: 5.4 %
HCT VFR BLD CALC: 38.6 % (ref 36–48)
HEMOGLOBIN: 13 G/DL (ref 12–16)
LYMPHOCYTES ABSOLUTE: 1.1 K/UL (ref 1–5.1)
LYMPHOCYTES RELATIVE PERCENT: 19.6 %
MCH RBC QN AUTO: 34 PG (ref 26–34)
MCHC RBC AUTO-ENTMCNC: 33.8 G/DL (ref 31–36)
MCV RBC AUTO: 100.7 FL (ref 80–100)
MONOCYTES ABSOLUTE: 0.3 K/UL (ref 0–1.3)
MONOCYTES RELATIVE PERCENT: 4.8 %
NEUTROPHILS ABSOLUTE: 4 K/UL (ref 1.7–7.7)
NEUTROPHILS RELATIVE PERCENT: 69.6 %
PDW BLD-RTO: 16.8 % (ref 12.4–15.4)
PLATELET # BLD: 364 K/UL (ref 135–450)
PMV BLD AUTO: 8.6 FL (ref 5–10.5)
RBC # BLD: 3.83 M/UL (ref 4–5.2)
WBC # BLD: 5.7 K/UL (ref 4–11)

## 2018-03-02 ENCOUNTER — OFFICE VISIT (OUTPATIENT)
Dept: PULMONOLOGY | Age: 73
End: 2018-03-02

## 2018-03-02 VITALS
SYSTOLIC BLOOD PRESSURE: 105 MMHG | DIASTOLIC BLOOD PRESSURE: 63 MMHG | OXYGEN SATURATION: 95 % | HEART RATE: 87 BPM | BODY MASS INDEX: 29.95 KG/M2 | WEIGHT: 180 LBS

## 2018-03-02 DIAGNOSIS — R06.02 SOB (SHORTNESS OF BREATH): Primary | ICD-10-CM

## 2018-03-02 DIAGNOSIS — R05.9 COUGH: ICD-10-CM

## 2018-03-02 DIAGNOSIS — J44.9 COPD, SEVERE (HCC): ICD-10-CM

## 2018-03-02 DIAGNOSIS — I25.5 ISCHEMIC CARDIOMYOPATHY: Chronic | ICD-10-CM

## 2018-03-02 PROCEDURE — G8417 CALC BMI ABV UP PARAM F/U: HCPCS | Performed by: INTERNAL MEDICINE

## 2018-03-02 PROCEDURE — 1090F PRES/ABSN URINE INCON ASSESS: CPT | Performed by: INTERNAL MEDICINE

## 2018-03-02 PROCEDURE — 1123F ACP DISCUSS/DSCN MKR DOCD: CPT | Performed by: INTERNAL MEDICINE

## 2018-03-02 PROCEDURE — 1036F TOBACCO NON-USER: CPT | Performed by: INTERNAL MEDICINE

## 2018-03-02 PROCEDURE — 4040F PNEUMOC VAC/ADMIN/RCVD: CPT | Performed by: INTERNAL MEDICINE

## 2018-03-02 PROCEDURE — 3014F SCREEN MAMMO DOC REV: CPT | Performed by: INTERNAL MEDICINE

## 2018-03-02 PROCEDURE — G8400 PT W/DXA NO RESULTS DOC: HCPCS | Performed by: INTERNAL MEDICINE

## 2018-03-02 PROCEDURE — 99214 OFFICE O/P EST MOD 30 MIN: CPT | Performed by: INTERNAL MEDICINE

## 2018-03-02 PROCEDURE — G8427 DOCREV CUR MEDS BY ELIG CLIN: HCPCS | Performed by: INTERNAL MEDICINE

## 2018-03-02 PROCEDURE — G8926 SPIRO NO PERF OR DOC: HCPCS | Performed by: INTERNAL MEDICINE

## 2018-03-02 PROCEDURE — G8482 FLU IMMUNIZE ORDER/ADMIN: HCPCS | Performed by: INTERNAL MEDICINE

## 2018-03-02 PROCEDURE — 3023F SPIROM DOC REV: CPT | Performed by: INTERNAL MEDICINE

## 2018-03-02 PROCEDURE — 3017F COLORECTAL CA SCREEN DOC REV: CPT | Performed by: INTERNAL MEDICINE

## 2018-03-02 PROCEDURE — G8598 ASA/ANTIPLAT THER USED: HCPCS | Performed by: INTERNAL MEDICINE

## 2018-03-03 LAB
QUANTIFERON (R) TB GOLD (INCUBATED): NEGATIVE
QUANTIFERON MITOGEN: >10 IU/ML
QUANTIFERON NIL: 0.09 IU/ML
QUANTIFERON TB AG MINUS NIL: 0 IU/ML (ref 0–0.34)

## 2018-03-07 ENCOUNTER — OFFICE VISIT (OUTPATIENT)
Dept: CARDIOLOGY CLINIC | Age: 73
End: 2018-03-07

## 2018-03-07 VITALS
DIASTOLIC BLOOD PRESSURE: 56 MMHG | HEIGHT: 65 IN | HEART RATE: 92 BPM | SYSTOLIC BLOOD PRESSURE: 120 MMHG | WEIGHT: 179 LBS | BODY MASS INDEX: 29.82 KG/M2

## 2018-03-07 DIAGNOSIS — R06.02 SOB (SHORTNESS OF BREATH): ICD-10-CM

## 2018-03-07 DIAGNOSIS — I42.9 CARDIOMYOPATHY, UNSPECIFIED TYPE (HCC): ICD-10-CM

## 2018-03-07 DIAGNOSIS — I10 ESSENTIAL HYPERTENSION: Chronic | ICD-10-CM

## 2018-03-07 DIAGNOSIS — E78.5 HYPERLIPIDEMIA, UNSPECIFIED HYPERLIPIDEMIA TYPE: Chronic | ICD-10-CM

## 2018-03-07 DIAGNOSIS — I25.5 ISCHEMIC CARDIOMYOPATHY: Chronic | ICD-10-CM

## 2018-03-07 DIAGNOSIS — I25.10 CORONARY ARTERY DISEASE INVOLVING NATIVE CORONARY ARTERY OF NATIVE HEART WITHOUT ANGINA PECTORIS: Primary | Chronic | ICD-10-CM

## 2018-03-07 PROCEDURE — 1036F TOBACCO NON-USER: CPT | Performed by: INTERNAL MEDICINE

## 2018-03-07 PROCEDURE — 4040F PNEUMOC VAC/ADMIN/RCVD: CPT | Performed by: INTERNAL MEDICINE

## 2018-03-07 PROCEDURE — 3017F COLORECTAL CA SCREEN DOC REV: CPT | Performed by: INTERNAL MEDICINE

## 2018-03-07 PROCEDURE — G8482 FLU IMMUNIZE ORDER/ADMIN: HCPCS | Performed by: INTERNAL MEDICINE

## 2018-03-07 PROCEDURE — 1090F PRES/ABSN URINE INCON ASSESS: CPT | Performed by: INTERNAL MEDICINE

## 2018-03-07 PROCEDURE — 99214 OFFICE O/P EST MOD 30 MIN: CPT | Performed by: INTERNAL MEDICINE

## 2018-03-07 PROCEDURE — G8427 DOCREV CUR MEDS BY ELIG CLIN: HCPCS | Performed by: INTERNAL MEDICINE

## 2018-03-07 PROCEDURE — 1123F ACP DISCUSS/DSCN MKR DOCD: CPT | Performed by: INTERNAL MEDICINE

## 2018-03-07 PROCEDURE — G8400 PT W/DXA NO RESULTS DOC: HCPCS | Performed by: INTERNAL MEDICINE

## 2018-03-07 PROCEDURE — 3014F SCREEN MAMMO DOC REV: CPT | Performed by: INTERNAL MEDICINE

## 2018-03-07 PROCEDURE — G8598 ASA/ANTIPLAT THER USED: HCPCS | Performed by: INTERNAL MEDICINE

## 2018-03-07 PROCEDURE — G8417 CALC BMI ABV UP PARAM F/U: HCPCS | Performed by: INTERNAL MEDICINE

## 2018-03-07 RX ORDER — CARVEDILOL 25 MG/1
TABLET ORAL
Qty: 90 TABLET | Refills: 3 | Status: SHIPPED | OUTPATIENT
Start: 2018-03-07 | End: 2019-07-21 | Stop reason: SDUPTHER

## 2018-03-07 RX ORDER — SPIRONOLACTONE 25 MG/1
25 TABLET ORAL DAILY
Qty: 90 TABLET | Refills: 3 | Status: SHIPPED | OUTPATIENT
Start: 2018-03-07 | End: 2019-07-21 | Stop reason: SDUPTHER

## 2018-03-07 RX ORDER — LOSARTAN POTASSIUM 50 MG/1
TABLET ORAL
Qty: 180 TABLET | Refills: 3 | Status: SHIPPED | OUTPATIENT
Start: 2018-03-07 | End: 2019-07-22 | Stop reason: SDUPTHER

## 2018-03-07 NOTE — LETTER
[address] [address] [address] [address]  Phone: [phone]  Fax: [phone]    Alexandra Gannon MD        March 8, 2018     Silvino Burrows MD  [address] [place] [address]    Patient: Mirian Morales  MR Number: [id number]  YOB: 1945  Date of Visit: 3/7/2018    Dear Dr. Silvino Burrows:      Siena Teran       Referring Provider:  Silvino Burrows MD     Chief Complaint   Patient presents with    Coronary Artery Disease     No cardiac complaints      History of Present Illness:  Mrs. Jeani Schirmer is here today for a yearly follow up of her CAD, HTN, HLD, CM. She is past smoker. She  sees Dr. Perlita Taveras for COPD. She feels she is doing well. Stable dyspnea. No chest pain, tightness or pressure. Tolerating meds. Very excited about new grandchildren. Past Medical History:   has a past medical history of CAD (coronary artery disease); Cancer ([address] Cardiomyopathy [facility]); [facility] Hyperlipidemia; Hypertension; Hypothyroidism; and Psoriasis. Surgical History:   has a past surgical history that includes bladder repair; Hysterectomy; malignant skin lesion excision; and shoulder surgery (03/2013). Social History:   reports that she quit smoking about 26 years ago. She has never used smokeless tobacco. She reports that she drinks alcohol. She reports that she does not use drugs. Family History:  family history includes Arthritis in her sister; Cancer in her mother; Heart Disease in her maternal grandmother and paternal grandmother. Home Medications:  Prior to Visit Medications    Medication Sig Taking?  Authorizing Provider   carvedilol (COREG) 25 MG tablet TAKE ONE-HALF TABLET BY  MOUTH TWICE A DAY Yes Alexandra Gannon MD   losartan (COZAAR) 50 MG tablet TAKE 1 TABLET BY MOUTH TWO  TIMES DAILY Yes Alexandra Gannon MD   spironolactone (ALDACTONE) 25 MG tablet TAKE 1 TABLET BY MOUTH  DAILY Yes ROS:  [x]Full ROS obtained and negative except as mentioned in HPI      Physical Examination:    Vitals:    03/07/18 0922   BP: (!) 120/56   Pulse: 92        · GENERAL: Well developed, well nourished, No acute distress  · NEUROLOGICAL: Alert and oriented, no motor abnormalities  · PSYCH: Calm affect  · SKIN: Warm and dry--healed stapled area to right shoulder  · HEENT: Normocephalic, Sclera non-icteric, mucus membranes moist  · NECK: supple, JVP normal  · CAROTID: Normal upstroke, no bruits  · CARDIAC: Normal PMI, regular rate and rhythm, normal S1S2, no murmur, rub, or gallop  · RESPIRATORY: Limited air movement, Minimal wheeze  · EXTREMITIES: No edema  · MUSCULOSKELETAL: No joint swelling or tenderness, no chest wall tenderness  · GASTROINTESTINAL: normal bowel sounds, soft, non-tender, no bruit    Assessment:     1. CAD (coronary artery disease): Stable cardiac status   Continue medical management  Cath 2001> Minimal LAD (30% first diagonal), medical management. 2. HTN (hypertension)   Controlled  BP (!) 120/56 (Site: Left Arm, Position: Sitting, Cuff Size: Medium Adult)   Pulse 92   Ht 5' 5\" (1.651 m)   Wt 179 lb (81.2 kg)   Breastfeeding? No   BMI 29.79 kg/m²       3. Cardiomyopathy:   Stable  Continue coreg and losartan   ECHO 2009> Trace MR and VA  EF 50%  ECHO  8/14/15> EF 45-50%   4. SOB:  Stable, seeing Pulmonary  Severe copd by pft's -- Follows with Dr. Ofelia Servin. Uses inhalers which has helped her breathing. ECHO 8/14/15> Mild global hk, ef 45-50%  Chest x-ray 1/15> Normal  CT chest 12/14> Opacities   5. Rheumatoid arthritis: On methotrexate, stable  6. Hyperlipemia: Statin intolerant      Stable cardiac status  F/u 1 year  ECHO if symptoms change    Thank you for allowing me to participate in the care of this individual.      Ca Hernandez M.D., [facility] [place]           If you have questions, please do not hesitate to call me. I look forward to following Amor Burgos along with you.     Sincerely,

## 2018-03-08 NOTE — COMMUNICATION BODY
[address] [facility]       Referring Provider:  Sugey Chacon MD     Chief Complaint   Patient presents with    Coronary Artery Disease     No cardiac complaints      History of Present Illness:  Mrs. Ronaldo Jackson is here today for a yearly follow up of her CAD, HTN, HLD, CM. She is past smoker. She  sees Dr. Daphney Rodgers for COPD. She feels she is doing well. Stable dyspnea. No chest pain, tightness or pressure. Tolerating meds. Very excited about new grandchildren. Past Medical History:   has a past medical history of CAD (coronary artery disease); [facility]); CHF (congestive heart failure) ([facility] Hyperlipidemia; Hypertension; Hypothyroidism; and Psoriasis. Surgical History:   has a past surgical history that includes bladder repair; Hysterectomy; malignant skin lesion excision; and shoulder surgery (03/2013). Social History:   reports that she quit smoking about 26 years ago. She has never used smokeless tobacco. She reports that she drinks alcohol. She reports that she does not use drugs. Family History:  family history includes Arthritis in her sister; Cancer in her mother; Heart Disease in her maternal grandmother and paternal grandmother. Home Medications:  Prior to Visit Medications    Medication Sig Taking?  Authorizing Provider   carvedilol (COREG) 25 MG tablet TAKE ONE-HALF TABLET BY  MOUTH TWICE A DAY Yes Татьяна Chaves MD   losartan (COZAAR) 50 MG tablet TAKE 1 TABLET BY MOUTH TWO  TIMES DAILY Yes Татьяна Chaves MD   spironolactone (ALDACTONE) 25 MG tablet TAKE 1 TABLET BY MOUTH  DAILY Yes Татьяна Chaves MD   metFORMIN (GLUCOPHAGE) 1000 MG tablet Take 1 tablet by mouth 2 times daily Yes Historical Provider, MD   venlafaxine (EFFEXOR XR) 75 MG extended release capsule Take 1 capsule by mouth daily Yes Historical Provider, MD   methotrexate (RHEUMATREX) 2.5 MG chemo tablet Take 8 tablets by mouth  once a week Yes Desi Gomez MD   gabapentin (NEURONTIN) non-icteric, mucus membranes moist  · NECK: supple, JVP normal  · CAROTID: Normal upstroke, no bruits  · CARDIAC: Normal PMI, regular rate and rhythm, normal S1S2, no murmur, rub, or gallop  · RESPIRATORY: Limited air movement, Minimal wheeze  · EXTREMITIES: No edema  · MUSCULOSKELETAL: No joint swelling or tenderness, no chest wall tenderness  · GASTROINTESTINAL: normal bowel sounds, soft, non-tender, no bruit    Assessment:     1. CAD (coronary artery disease): Stable cardiac status   Continue medical management  Cath 2001> Minimal LAD (30% first diagonal), medical management. 2. HTN (hypertension)   Controlled  BP (!) 120/56 (Site: Left Arm, Position: Sitting, Cuff Size: Medium Adult)   Pulse 92   Ht 5' 5\" (1.651 m)   Wt 179 lb (81.2 kg)   Breastfeeding? No   BMI 29.79 kg/m²       3. Cardiomyopathy:   Stable  Continue coreg and losartan   ECHO 2009> Trace MR and MS  EF 50%  ECHO  8/14/15> EF 45-50%   4. SOB:  Stable, seeing Pulmonary  Severe copd by pft's -- Follows with Dr. Daphney Rodgers. Uses inhalers which has helped her breathing. ECHO 8/14/15> Mild global hk, ef 45-50%  Chest x-ray 1/15> Normal  CT chest 12/14> Opacities   5. Rheumatoid arthritis: On methotrexate, stable  6.   Hyperlipemia: Statin intolerant      Stable cardiac status  F/u 1 year  ECHO if symptoms change    Thank you for allowing me to participate in the care of this individual.      Narda Davenport M.D., [facility]

## 2018-03-09 ENCOUNTER — TELEPHONE (OUTPATIENT)
Dept: CARDIOLOGY CLINIC | Age: 73
End: 2018-03-09

## 2018-03-09 ENCOUNTER — HOSPITAL ENCOUNTER (OUTPATIENT)
Dept: PULMONOLOGY | Age: 73
Discharge: HOME | End: 2018-03-09
Attending: INTERNAL MEDICINE | Admitting: INTERNAL MEDICINE

## 2018-03-09 VITALS — OXYGEN SATURATION: 98 %

## 2018-03-09 DIAGNOSIS — J44.9 CHRONIC OBSTRUCTIVE PULMONARY DISEASE (HCC): ICD-10-CM

## 2018-03-09 DIAGNOSIS — J44.9 COPD, SEVERE (HCC): ICD-10-CM

## 2018-03-09 RX ORDER — ALBUTEROL SULFATE 90 UG/1
4 AEROSOL, METERED RESPIRATORY (INHALATION) ONCE
Status: COMPLETED | OUTPATIENT
Start: 2018-03-09 | End: 2018-03-09

## 2018-03-09 RX ADMIN — ALBUTEROL SULFATE 4 PUFF: 90 AEROSOL, METERED RESPIRATORY (INHALATION) at 14:30

## 2018-03-13 ENCOUNTER — TELEPHONE (OUTPATIENT)
Dept: PULMONOLOGY | Age: 73
End: 2018-03-13

## 2018-03-13 NOTE — TELEPHONE ENCOUNTER
PT called in wanting to go over the results of her PFT. PT also wondering if she can have one of the inhalers they use for the PFT test to carry around with her as well.      PT call back number [phone]

## 2018-04-03 ENCOUNTER — HOSPITAL ENCOUNTER (OUTPATIENT)
Dept: OTHER | Age: 73
Discharge: HOME | End: 2018-04-03
Attending: INTERNAL MEDICINE | Admitting: INTERNAL MEDICINE

## 2018-04-03 DIAGNOSIS — I10 ESSENTIAL HYPERTENSION: Chronic | ICD-10-CM

## 2018-04-03 LAB
ANION GAP SERPL CALCULATED.3IONS-SCNC: 15 MMOL/L (ref 3–16)
BUN BLDV-MCNC: 8 MG/DL (ref 7–20)
CALCIUM SERPL-MCNC: 9.2 MG/DL (ref 8.3–10.6)
CHLORIDE BLD-SCNC: 99 MMOL/L (ref 99–110)
CO2: 24 MMOL/L (ref 21–32)
CREAT SERPL-MCNC: 0.7 MG/DL (ref 0.6–1.2)
GFR AFRICAN AMERICAN: >60
GFR NON-AFRICAN AMERICAN: >60
GLUCOSE BLD-MCNC: 218 MG/DL (ref 70–99)
POTASSIUM SERPL-SCNC: 4.8 MMOL/L (ref 3.5–5.1)
SODIUM BLD-SCNC: 138 MMOL/L (ref 136–145)

## 2018-05-01 ENCOUNTER — TELEPHONE (OUTPATIENT)
Dept: INTERNAL MEDICINE CLINIC | Age: 73
End: 2018-05-01

## 2018-05-11 ENCOUNTER — TELEPHONE (OUTPATIENT)
Dept: RHEUMATOLOGY | Age: 73
End: 2018-05-11

## 2018-05-29 ENCOUNTER — OFFICE VISIT (OUTPATIENT)
Dept: RHEUMATOLOGY | Age: 73
End: 2018-05-29

## 2018-05-29 VITALS
SYSTOLIC BLOOD PRESSURE: 124 MMHG | BODY MASS INDEX: 30.18 KG/M2 | HEIGHT: 65 IN | HEART RATE: 80 BPM | WEIGHT: 181.13 LBS | DIASTOLIC BLOOD PRESSURE: 70 MMHG

## 2018-05-29 DIAGNOSIS — Z79.899 ENCOUNTER FOR LONG-TERM (CURRENT) USE OF HIGH-RISK MEDICATION: ICD-10-CM

## 2018-05-29 DIAGNOSIS — L40.50 PSORIATIC ARTHRITIS (HCC): Primary | ICD-10-CM

## 2018-05-29 DIAGNOSIS — Z51.81 ENCOUNTER FOR THERAPEUTIC DRUG MONITORING: ICD-10-CM

## 2018-05-29 LAB
BASOPHILS ABSOLUTE: 0 K/UL (ref 0–0.2)
BASOPHILS RELATIVE PERCENT: 0.6 %
EOSINOPHILS ABSOLUTE: 0.2 K/UL (ref 0–0.6)
EOSINOPHILS RELATIVE PERCENT: 3.6 %
HCT VFR BLD CALC: 36.4 % (ref 36–48)
HEMOGLOBIN: 12.3 G/DL (ref 12–16)
LYMPHOCYTES ABSOLUTE: 1.3 K/UL (ref 1–5.1)
LYMPHOCYTES RELATIVE PERCENT: 21 %
MCH RBC QN AUTO: 33.7 PG (ref 26–34)
MCHC RBC AUTO-ENTMCNC: 33.6 G/DL (ref 31–36)
MCV RBC AUTO: 100.1 FL (ref 80–100)
MONOCYTES ABSOLUTE: 0.3 K/UL (ref 0–1.3)
MONOCYTES RELATIVE PERCENT: 5.3 %
NEUTROPHILS ABSOLUTE: 4.2 K/UL (ref 1.7–7.7)
NEUTROPHILS RELATIVE PERCENT: 69.5 %
PDW BLD-RTO: 16.6 % (ref 12.4–15.4)
PLATELET # BLD: 309 K/UL (ref 135–450)
PMV BLD AUTO: 8.2 FL (ref 5–10.5)
RBC # BLD: 3.64 M/UL (ref 4–5.2)
WBC # BLD: 6.1 K/UL (ref 4–11)

## 2018-05-29 PROCEDURE — 1123F ACP DISCUSS/DSCN MKR DOCD: CPT | Performed by: INTERNAL MEDICINE

## 2018-05-29 PROCEDURE — G8598 ASA/ANTIPLAT THER USED: HCPCS | Performed by: INTERNAL MEDICINE

## 2018-05-29 PROCEDURE — 1090F PRES/ABSN URINE INCON ASSESS: CPT | Performed by: INTERNAL MEDICINE

## 2018-05-29 PROCEDURE — 1036F TOBACCO NON-USER: CPT | Performed by: INTERNAL MEDICINE

## 2018-05-29 PROCEDURE — 4040F PNEUMOC VAC/ADMIN/RCVD: CPT | Performed by: INTERNAL MEDICINE

## 2018-05-29 PROCEDURE — 3017F COLORECTAL CA SCREEN DOC REV: CPT | Performed by: INTERNAL MEDICINE

## 2018-05-29 PROCEDURE — 99214 OFFICE O/P EST MOD 30 MIN: CPT | Performed by: INTERNAL MEDICINE

## 2018-05-29 PROCEDURE — G8400 PT W/DXA NO RESULTS DOC: HCPCS | Performed by: INTERNAL MEDICINE

## 2018-05-29 PROCEDURE — G8417 CALC BMI ABV UP PARAM F/U: HCPCS | Performed by: INTERNAL MEDICINE

## 2018-05-29 PROCEDURE — G8427 DOCREV CUR MEDS BY ELIG CLIN: HCPCS | Performed by: INTERNAL MEDICINE

## 2018-05-30 LAB
ALBUMIN SERPL-MCNC: 4.1 G/DL (ref 3.4–5)
ALP BLD-CCNC: 78 U/L (ref 40–129)
ALT SERPL-CCNC: 14 U/L (ref 10–40)
AST SERPL-CCNC: 25 U/L (ref 15–37)
BILIRUB SERPL-MCNC: 0.5 MG/DL (ref 0–1)
BILIRUBIN DIRECT: <0.2 MG/DL (ref 0–0.3)
BILIRUBIN, INDIRECT: NORMAL MG/DL (ref 0–1)
CREAT SERPL-MCNC: 0.5 MG/DL (ref 0.6–1.2)
GFR AFRICAN AMERICAN: >60
GFR NON-AFRICAN AMERICAN: >60
TOTAL PROTEIN: 6.6 G/DL (ref 6.4–8.2)

## 2018-08-21 ENCOUNTER — HOSPITAL ENCOUNTER (OUTPATIENT)
Dept: OTHER | Age: 73
Discharge: HOME | End: 2018-08-21
Attending: INTERNAL MEDICINE | Admitting: INTERNAL MEDICINE

## 2018-08-21 LAB
ESTIMATED AVERAGE GLUCOSE: 174.3 MG/DL
HBA1C MFR BLD: 7.7 %

## 2018-08-29 ENCOUNTER — OFFICE VISIT (OUTPATIENT)
Dept: RHEUMATOLOGY | Age: 73
End: 2018-08-29

## 2018-08-29 VITALS
DIASTOLIC BLOOD PRESSURE: 72 MMHG | BODY MASS INDEX: 30.34 KG/M2 | WEIGHT: 182.13 LBS | HEIGHT: 65 IN | SYSTOLIC BLOOD PRESSURE: 118 MMHG | HEART RATE: 80 BPM

## 2018-08-29 DIAGNOSIS — Z79.899 ENCOUNTER FOR LONG-TERM (CURRENT) USE OF HIGH-RISK MEDICATION: ICD-10-CM

## 2018-08-29 DIAGNOSIS — L40.50 PSORIATIC ARTHRITIS (HCC): Primary | ICD-10-CM

## 2018-08-29 DIAGNOSIS — Z51.81 ENCOUNTER FOR THERAPEUTIC DRUG MONITORING: ICD-10-CM

## 2018-08-29 LAB
ALBUMIN SERPL-MCNC: 3.9 G/DL (ref 3.4–5)
ALP BLD-CCNC: 79 U/L (ref 40–129)
ALT SERPL-CCNC: 19 U/L (ref 10–40)
AST SERPL-CCNC: 32 U/L (ref 15–37)
BASOPHILS ABSOLUTE: 0 K/UL (ref 0–0.2)
BASOPHILS RELATIVE PERCENT: 0.4 %
BILIRUB SERPL-MCNC: 0.5 MG/DL (ref 0–1)
BILIRUBIN DIRECT: <0.2 MG/DL (ref 0–0.3)
BILIRUBIN, INDIRECT: NORMAL MG/DL (ref 0–1)
CREAT SERPL-MCNC: 0.6 MG/DL (ref 0.6–1.2)
EOSINOPHILS ABSOLUTE: 0.2 K/UL (ref 0–0.6)
EOSINOPHILS RELATIVE PERCENT: 2.8 %
GFR AFRICAN AMERICAN: >60
GFR NON-AFRICAN AMERICAN: >60
HCT VFR BLD CALC: 35.5 % (ref 36–48)
HEMOGLOBIN: 11.7 G/DL (ref 12–16)
LYMPHOCYTES ABSOLUTE: 1.2 K/UL (ref 1–5.1)
LYMPHOCYTES RELATIVE PERCENT: 19.1 %
MCH RBC QN AUTO: 33.8 PG (ref 26–34)
MCHC RBC AUTO-ENTMCNC: 33 G/DL (ref 31–36)
MCV RBC AUTO: 102.7 FL (ref 80–100)
MONOCYTES ABSOLUTE: 0.4 K/UL (ref 0–1.3)
MONOCYTES RELATIVE PERCENT: 6.6 %
NEUTROPHILS ABSOLUTE: 4.4 K/UL (ref 1.7–7.7)
NEUTROPHILS RELATIVE PERCENT: 71.1 %
PDW BLD-RTO: 16.7 % (ref 12.4–15.4)
PLATELET # BLD: 268 K/UL (ref 135–450)
PMV BLD AUTO: 8.2 FL (ref 5–10.5)
RBC # BLD: 3.46 M/UL (ref 4–5.2)
TOTAL PROTEIN: 6.4 G/DL (ref 6.4–8.2)
WBC # BLD: 6.2 K/UL (ref 4–11)

## 2018-08-29 PROCEDURE — G8598 ASA/ANTIPLAT THER USED: HCPCS | Performed by: INTERNAL MEDICINE

## 2018-08-29 PROCEDURE — G8417 CALC BMI ABV UP PARAM F/U: HCPCS | Performed by: INTERNAL MEDICINE

## 2018-08-29 PROCEDURE — 99214 OFFICE O/P EST MOD 30 MIN: CPT | Performed by: INTERNAL MEDICINE

## 2018-08-29 PROCEDURE — 3017F COLORECTAL CA SCREEN DOC REV: CPT | Performed by: INTERNAL MEDICINE

## 2018-08-29 PROCEDURE — 1036F TOBACCO NON-USER: CPT | Performed by: INTERNAL MEDICINE

## 2018-08-29 PROCEDURE — G8400 PT W/DXA NO RESULTS DOC: HCPCS | Performed by: INTERNAL MEDICINE

## 2018-08-29 PROCEDURE — 4040F PNEUMOC VAC/ADMIN/RCVD: CPT | Performed by: INTERNAL MEDICINE

## 2018-08-29 PROCEDURE — 1090F PRES/ABSN URINE INCON ASSESS: CPT | Performed by: INTERNAL MEDICINE

## 2018-08-29 PROCEDURE — 1123F ACP DISCUSS/DSCN MKR DOCD: CPT | Performed by: INTERNAL MEDICINE

## 2018-08-29 PROCEDURE — 1101F PT FALLS ASSESS-DOCD LE1/YR: CPT | Performed by: INTERNAL MEDICINE

## 2018-08-29 PROCEDURE — G8427 DOCREV CUR MEDS BY ELIG CLIN: HCPCS | Performed by: INTERNAL MEDICINE

## 2018-08-29 RX ORDER — ACETAMINOPHEN 500 MG
500 TABLET ORAL EVERY 6 HOURS PRN
COMMUNITY

## 2018-08-29 RX ORDER — MELOXICAM 7.5 MG/1
7.5 TABLET ORAL DAILY
COMMUNITY
End: 2018-08-29 | Stop reason: SDUPTHER

## 2018-08-29 RX ORDER — MELOXICAM 7.5 MG/1
7.5 TABLET ORAL DAILY
Qty: 30 TABLET | Refills: 2 | Status: SHIPPED | OUTPATIENT
Start: 2018-08-29 | End: 2018-12-03 | Stop reason: SDUPTHER

## 2018-08-29 NOTE — PROGRESS NOTES
Yes Historical Provider, MD   aspirin 81 MG tablet Take 81 mg by mouth every other day  Yes Historical Provider, MD   HYDROcodone-acetaminophen (NORCO) 5-325 MG per tablet Take 1 tablet by mouth every 6 hours as needed for Pain. Yes Historical Provider, MD   venlafaxine (EFFEXOR) 75 MG tablet Take 75 mg by mouth daily. Yes Historical Provider, MD   diclofenac sodium 1 % GEL Apply 4 g topically 4 times daily. Yes Scott Adams MD   Insulin Glargine (LANTUS SC) Inject 25 Units into the skin 2 times daily. Yes Historical MD Penelope   Insulin Aspart (NOVOLOG FLEXPEN SC) Inject  into the skin. Yes Historical Provider, MD   levothyroxine (SYNTHROID) 100 MCG tablet Take 75 mcg by mouth daily  Yes Historical Provider, MD   estrogens, conjugated, (PREMARIN) 0.625 MG tablet Take 0.625 mg by mouth every 72 hours. Yes Historical Provider, MD   calcium carbonate (OSCAL) 500 MG TABS tablet Take 500 mg by mouth daily. Yes Historical Provider, MD   perphenazine 2 MG tablet Take 2 mg by mouth nightly. Yes Historical Provider, MD        OBJECTIVE:  /72   Pulse 80   Ht 5' 5\" (1.651 m)   Wt 182 lb 2 oz (82.6 kg)   BMI 30.31 kg/m²      Physical Exam:    General: the patient demonstrated normal gait and posture without evidence of overt muscle wasting. Hygiene appears normal    Ears, mouth, nose, throat: no mucosal sores      Respiratory: assessment of the patient's respiratory effort showed no evidence of abnormal respiration and no use of accessory muscles. Diaphragmatic movement is normal.  Percussion of the patient's chest showed no evidence of dullness flatness or hyperresonance. Auscultation of the patient's lungs reveal normal breath sounds in all lung fields. There is no evidence of abnormal sounds such as rales or wheezes. There is no evidence of friction rub. Cardiovascular: palpation of the patient's chest revealed no abnormal thrill. The point of maximal impulse showed no displacement.

## 2018-09-04 ENCOUNTER — OFFICE VISIT (OUTPATIENT)
Dept: PULMONOLOGY | Age: 73
End: 2018-09-04

## 2018-09-04 VITALS
WEIGHT: 184 LBS | OXYGEN SATURATION: 97 % | BODY MASS INDEX: 30.62 KG/M2 | HEART RATE: 71 BPM | SYSTOLIC BLOOD PRESSURE: 132 MMHG | DIASTOLIC BLOOD PRESSURE: 69 MMHG

## 2018-09-04 DIAGNOSIS — J44.9 COPD, SEVERE (HCC): ICD-10-CM

## 2018-09-04 DIAGNOSIS — R06.02 SOB (SHORTNESS OF BREATH): Primary | ICD-10-CM

## 2018-09-04 DIAGNOSIS — I10 ESSENTIAL HYPERTENSION: Chronic | ICD-10-CM

## 2018-09-04 DIAGNOSIS — I25.5 ISCHEMIC CARDIOMYOPATHY: Chronic | ICD-10-CM

## 2018-09-04 PROCEDURE — G8427 DOCREV CUR MEDS BY ELIG CLIN: HCPCS | Performed by: INTERNAL MEDICINE

## 2018-09-04 PROCEDURE — 1101F PT FALLS ASSESS-DOCD LE1/YR: CPT | Performed by: INTERNAL MEDICINE

## 2018-09-04 PROCEDURE — G8417 CALC BMI ABV UP PARAM F/U: HCPCS | Performed by: INTERNAL MEDICINE

## 2018-09-04 PROCEDURE — G8598 ASA/ANTIPLAT THER USED: HCPCS | Performed by: INTERNAL MEDICINE

## 2018-09-04 PROCEDURE — 1036F TOBACCO NON-USER: CPT | Performed by: INTERNAL MEDICINE

## 2018-09-04 PROCEDURE — G8400 PT W/DXA NO RESULTS DOC: HCPCS | Performed by: INTERNAL MEDICINE

## 2018-09-04 PROCEDURE — 3023F SPIROM DOC REV: CPT | Performed by: INTERNAL MEDICINE

## 2018-09-04 PROCEDURE — 1123F ACP DISCUSS/DSCN MKR DOCD: CPT | Performed by: INTERNAL MEDICINE

## 2018-09-04 PROCEDURE — 1090F PRES/ABSN URINE INCON ASSESS: CPT | Performed by: INTERNAL MEDICINE

## 2018-09-04 PROCEDURE — 99213 OFFICE O/P EST LOW 20 MIN: CPT | Performed by: INTERNAL MEDICINE

## 2018-09-04 PROCEDURE — 3017F COLORECTAL CA SCREEN DOC REV: CPT | Performed by: INTERNAL MEDICINE

## 2018-09-04 PROCEDURE — G8926 SPIRO NO PERF OR DOC: HCPCS | Performed by: INTERNAL MEDICINE

## 2018-09-04 PROCEDURE — 4040F PNEUMOC VAC/ADMIN/RCVD: CPT | Performed by: INTERNAL MEDICINE

## 2018-09-04 NOTE — PROGRESS NOTES
[facility]  Progress Note  MD Luke Anguianomacie Quinteros   YOB: 1945    Date of Visit:  9/4/2018    Assessment/Plan:  1. SOB (shortness of breath) & 2. Cough  No recent flare up  Feels this has improved with time and exercise. 3. COPD, severe ([address]  PFT 3/18:  Spirometry reveals decreased FVC at 2.11 liters, which is 69% predicted. FEV1 is decreased at 1.05 liters, which is 46% predicted. FEV1/FVC ratio  is reduced at 50%. There is significant improvement in FEV1 after inhaled  bronchodilators. Lung volumes reveal increased total lung capacity at 138%  predicted. Residual volume is increased at 234% predicted. Diffusion  capacity is mildly reduced. In comparison to a study from 07/2015,  spirometry is similar, but air trapping and hyperinflation have worsened.     IMPRESSION:  Severe obstructive lung disease with hyperinflation. There  was a good response to inhaled bronchodilators. Hyperinflation has  worsened since the preceding study. Continue Anoro  I have independently reviewed pulmonary function testing. Recent testing shows severe disease and worsening hyperinflation. I reviewed her last CXR which was 4/17, no acute disease. 4. Cardiomyopathy ([address]  Echo 8/15:  Summary   -Borderline to mildly reduced systolic function with an estimated ejection   fraction of 45-50%. -Mild mitral regurgitation is present.   -Trivial tricuspid regurgitation. Sees Dr Ilda Holter Stable      5. Essential hypertension  BP is ok  On Cozaar and Aldactone      FOLLOW UP: 6 months      Chief Complaint   Patient presents with    6 Month Follow-Up       HPI  The patient presents with a chief complaint of shortness of breath and cough. She is known to have COPD which is severe. She has been doing well. No recent flare ups. She feels like her basleine dyspnea is actually better. She  Can do things in the yard she couldn't do before. Cleleona Davis  No Chest as needed for Pain. Historical Provider, MD   venlafaxine (EFFEXOR) 75 MG tablet Take 75 mg by mouth daily. Historical Provider, MD   diclofenac sodium 1 % GEL Apply 4 g topically 4 times daily. Yudy Gautam MD   Insulin Glargine (LANTUS SC) Inject 25 Units into the skin 2 times daily. Historical Provider, MD   Insulin Aspart (NOVOLOG FLEXPEN SC) Inject  into the skin. Historical Provider, MD   levothyroxine (SYNTHROID) 100 MCG tablet Take 75 mcg by mouth daily   Historical Provider, MD   estrogens, conjugated, (PREMARIN) 0.625 MG tablet Take 0.625 mg by mouth every 72 hours. Historical Provider, MD   calcium carbonate (OSCAL) 500 MG TABS tablet Take 500 mg by mouth daily. Historical Provider, MD   perphenazine 2 MG tablet Take 2 mg by mouth nightly. Historical Provider, MD       Vitals:    09/04/18 1319   BP: 132/69   Pulse: 71   SpO2: 97%   Weight: 184 lb (83.5 kg)     Body mass index is 30.62 kg/m².      Wt Readings from Last 3 Encounters:   09/04/18 184 lb (83.5 kg)   08/29/18 182 lb 2 oz (82.6 kg)   05/29/18 181 lb 2 oz (82.2 kg)     BP Readings from Last 3 Encounters:   09/04/18 132/69   08/29/18 118/72   05/29/18 124/70        History   Smoking Status    Former Smoker    Quit date: 7/6/1991   Smokeless Tobacco    Never Used

## 2018-09-26 ENCOUNTER — TELEPHONE (OUTPATIENT)
Dept: INTERNAL MEDICINE CLINIC | Age: 73
End: 2018-09-26

## 2018-09-26 PROBLEM — R05.9 COUGH: Status: RESOLVED | Noted: 2017-04-04 | Resolved: 2018-09-26

## 2018-12-03 ENCOUNTER — OFFICE VISIT (OUTPATIENT)
Dept: RHEUMATOLOGY | Age: 73
End: 2018-12-03
Payer: MEDICARE

## 2018-12-03 VITALS
SYSTOLIC BLOOD PRESSURE: 122 MMHG | WEIGHT: 188.13 LBS | HEIGHT: 65 IN | DIASTOLIC BLOOD PRESSURE: 76 MMHG | BODY MASS INDEX: 31.34 KG/M2 | HEART RATE: 68 BPM

## 2018-12-03 DIAGNOSIS — Z51.81 ENCOUNTER FOR THERAPEUTIC DRUG MONITORING: ICD-10-CM

## 2018-12-03 DIAGNOSIS — L40.50 PSORIATIC ARTHRITIS (HCC): Primary | ICD-10-CM

## 2018-12-03 DIAGNOSIS — Z79.899 ENCOUNTER FOR LONG-TERM (CURRENT) USE OF HIGH-RISK MEDICATION: ICD-10-CM

## 2018-12-03 LAB
ALBUMIN SERPL-MCNC: 3.9 G/DL (ref 3.4–5)
ALP BLD-CCNC: 101 U/L (ref 40–129)
ALT SERPL-CCNC: 13 U/L (ref 10–40)
AST SERPL-CCNC: 19 U/L (ref 15–37)
BASOPHILS ABSOLUTE: 0 K/UL (ref 0–0.2)
BASOPHILS RELATIVE PERCENT: 0.7 %
BILIRUB SERPL-MCNC: 0.6 MG/DL (ref 0–1)
BILIRUBIN DIRECT: <0.2 MG/DL (ref 0–0.3)
BILIRUBIN, INDIRECT: ABNORMAL MG/DL (ref 0–1)
CREAT SERPL-MCNC: 0.6 MG/DL (ref 0.6–1.2)
EOSINOPHILS ABSOLUTE: 0.4 K/UL (ref 0–0.6)
EOSINOPHILS RELATIVE PERCENT: 8.1 %
GFR AFRICAN AMERICAN: >60
GFR NON-AFRICAN AMERICAN: >60
HCT VFR BLD CALC: 34.4 % (ref 36–48)
HEMOGLOBIN: 11.5 G/DL (ref 12–16)
LYMPHOCYTES ABSOLUTE: 1 K/UL (ref 1–5.1)
LYMPHOCYTES RELATIVE PERCENT: 20.7 %
MCH RBC QN AUTO: 35.1 PG (ref 26–34)
MCHC RBC AUTO-ENTMCNC: 33.5 G/DL (ref 31–36)
MCV RBC AUTO: 104.9 FL (ref 80–100)
MONOCYTES ABSOLUTE: 0.6 K/UL (ref 0–1.3)
MONOCYTES RELATIVE PERCENT: 11.2 %
NEUTROPHILS ABSOLUTE: 2.9 K/UL (ref 1.7–7.7)
NEUTROPHILS RELATIVE PERCENT: 59.3 %
PDW BLD-RTO: 15.7 % (ref 12.4–15.4)
PLATELET # BLD: 227 K/UL (ref 135–450)
PMV BLD AUTO: 8.4 FL (ref 5–10.5)
RBC # BLD: 3.27 M/UL (ref 4–5.2)
TOTAL PROTEIN: 6.1 G/DL (ref 6.4–8.2)
WBC # BLD: 4.9 K/UL (ref 4–11)

## 2018-12-03 PROCEDURE — 1036F TOBACCO NON-USER: CPT | Performed by: INTERNAL MEDICINE

## 2018-12-03 PROCEDURE — G8427 DOCREV CUR MEDS BY ELIG CLIN: HCPCS | Performed by: INTERNAL MEDICINE

## 2018-12-03 PROCEDURE — 1123F ACP DISCUSS/DSCN MKR DOCD: CPT | Performed by: INTERNAL MEDICINE

## 2018-12-03 PROCEDURE — 4040F PNEUMOC VAC/ADMIN/RCVD: CPT | Performed by: INTERNAL MEDICINE

## 2018-12-03 PROCEDURE — G8400 PT W/DXA NO RESULTS DOC: HCPCS | Performed by: INTERNAL MEDICINE

## 2018-12-03 PROCEDURE — 1101F PT FALLS ASSESS-DOCD LE1/YR: CPT | Performed by: INTERNAL MEDICINE

## 2018-12-03 PROCEDURE — G8417 CALC BMI ABV UP PARAM F/U: HCPCS | Performed by: INTERNAL MEDICINE

## 2018-12-03 PROCEDURE — 1090F PRES/ABSN URINE INCON ASSESS: CPT | Performed by: INTERNAL MEDICINE

## 2018-12-03 PROCEDURE — G8484 FLU IMMUNIZE NO ADMIN: HCPCS | Performed by: INTERNAL MEDICINE

## 2018-12-03 PROCEDURE — 3017F COLORECTAL CA SCREEN DOC REV: CPT | Performed by: INTERNAL MEDICINE

## 2018-12-03 PROCEDURE — G8598 ASA/ANTIPLAT THER USED: HCPCS | Performed by: INTERNAL MEDICINE

## 2018-12-03 PROCEDURE — 99214 OFFICE O/P EST MOD 30 MIN: CPT | Performed by: INTERNAL MEDICINE

## 2018-12-03 RX ORDER — MELOXICAM 7.5 MG/1
7.5 TABLET ORAL DAILY
Qty: 30 TABLET | Refills: 2 | Status: SHIPPED | OUTPATIENT
Start: 2018-12-03 | End: 2019-04-16 | Stop reason: SDUPTHER

## 2018-12-03 NOTE — PROGRESS NOTES
SUBJECTIVE:    Patient ID: Kelle Stephenson is a 68 y.o. female. Patient returns for follow-up of psoriatic arthritis. She's feeling much better since beginning Humira 40 mg every other week. She continues on methotrexate 17.5 mg weekly and meloxicam 7.5 mg a day. She can now do her ADLs  Review of Systems:    Respiratory: denies cough, denies shortness of breath  Gastrointestinal: denies abdominal pain, denies nausea  Musculoskeletal:                  5 minutes       Morning stiffness  Ears, nose, mouth: denies mucosal sores  Skin: denies rash, denies alopecia. Denies injection site reactions. The remainder of her review of systems is negative    Prior to Visit Medications    Medication Sig Taking?  Authorizing Provider   adalimumab (HUMIRA) 40 MG/0.8ML injection Inject 40 mg into the skin once Yes Historical Provider, MD   meloxicam (MOBIC) 7.5 MG tablet Take 1 tablet by mouth daily Yes Odilon Harry MD   methotrexate (RHEUMATREX) 2.5 MG chemo tablet Take 7 tablets by mouth  once a week Yes Odilon Harry MD   acetaminophen (TYLENOL) 500 MG tablet Take 500 mg by mouth every 6 hours as needed for Pain Yes Historical Provider, MD   carvedilol (COREG) 25 MG tablet TAKE ONE-HALF TABLET BY  MOUTH TWICE A DAY Yes Faith Harvey MD   losartan (COZAAR) 50 MG tablet TAKE 1 TABLET BY MOUTH TWO  TIMES DAILY Yes Faith Harvey MD   spironolactone (ALDACTONE) 25 MG tablet Take 1 tablet by mouth daily Yes Faith Harvey MD   venlafaxine (EFFEXOR XR) 75 MG extended release capsule Take 1 capsule by mouth daily Yes Historical Provider, MD   gabapentin (NEURONTIN) 100 MG capsule Take 100 mg by mouth 3 times daily Yes Historical Provider, MD   furosemide (LASIX) 40 MG tablet Take 1 tablet by mouth daily  Patient taking differently: Take 40 mg by mouth daily as needed  Yes Faith Harvey MD   umeclidinium-vilanterol (ANORO ELLIPTA) 62.5-25 MCG/INH AEPB inhaler Inhale 1 puff into the lungs daily Yes Melissa Zaidi MD point of maximal impulse showed no displacement. Auscultation of the heart revealed no evidence of murmur gallop or abnormal heart sound. Musculoskeletal: One plus soft tissue swelling left wrist.  No other synovitis noted  Skin: no visible psoriasis    ASSESSMENT: Psoriatic arthritis much improved. Chemotherapy. Biologic therapy        PLAN: Blood work will be obtained today to monitor for drug reactions. Laboratory studies from last visit. I'll see patient back in 3 months time.

## 2019-01-21 ENCOUNTER — TELEPHONE (OUTPATIENT)
Dept: INTERNAL MEDICINE CLINIC | Age: 74
End: 2019-01-21

## 2019-01-22 ENCOUNTER — OFFICE VISIT (OUTPATIENT)
Dept: RHEUMATOLOGY | Age: 74
End: 2019-01-22
Payer: MEDICARE

## 2019-01-22 VITALS
DIASTOLIC BLOOD PRESSURE: 70 MMHG | HEART RATE: 76 BPM | WEIGHT: 188 LBS | SYSTOLIC BLOOD PRESSURE: 112 MMHG | BODY MASS INDEX: 31.32 KG/M2 | HEIGHT: 65 IN

## 2019-01-22 DIAGNOSIS — M54.50 ACUTE LEFT-SIDED LOW BACK PAIN WITHOUT SCIATICA: Primary | ICD-10-CM

## 2019-01-22 PROCEDURE — G8598 ASA/ANTIPLAT THER USED: HCPCS | Performed by: INTERNAL MEDICINE

## 2019-01-22 PROCEDURE — G8428 CUR MEDS NOT DOCUMENT: HCPCS | Performed by: INTERNAL MEDICINE

## 2019-01-22 PROCEDURE — G8400 PT W/DXA NO RESULTS DOC: HCPCS | Performed by: INTERNAL MEDICINE

## 2019-01-22 PROCEDURE — G8484 FLU IMMUNIZE NO ADMIN: HCPCS | Performed by: INTERNAL MEDICINE

## 2019-01-22 PROCEDURE — 3017F COLORECTAL CA SCREEN DOC REV: CPT | Performed by: INTERNAL MEDICINE

## 2019-01-22 PROCEDURE — 4040F PNEUMOC VAC/ADMIN/RCVD: CPT | Performed by: INTERNAL MEDICINE

## 2019-01-22 PROCEDURE — 1123F ACP DISCUSS/DSCN MKR DOCD: CPT | Performed by: INTERNAL MEDICINE

## 2019-01-22 PROCEDURE — 1101F PT FALLS ASSESS-DOCD LE1/YR: CPT | Performed by: INTERNAL MEDICINE

## 2019-01-22 PROCEDURE — 99213 OFFICE O/P EST LOW 20 MIN: CPT | Performed by: INTERNAL MEDICINE

## 2019-01-22 PROCEDURE — 1090F PRES/ABSN URINE INCON ASSESS: CPT | Performed by: INTERNAL MEDICINE

## 2019-01-22 PROCEDURE — 1036F TOBACCO NON-USER: CPT | Performed by: INTERNAL MEDICINE

## 2019-01-22 PROCEDURE — G8417 CALC BMI ABV UP PARAM F/U: HCPCS | Performed by: INTERNAL MEDICINE

## 2019-01-22 RX ORDER — TIZANIDINE 4 MG/1
TABLET ORAL
COMMUNITY
Start: 2019-01-16 | End: 2019-01-22 | Stop reason: SDUPTHER

## 2019-01-22 RX ORDER — TIZANIDINE 4 MG/1
4 TABLET ORAL 2 TIMES DAILY
Qty: 60 TABLET | Refills: 0 | Status: SHIPPED | OUTPATIENT
Start: 2019-01-22 | End: 2019-03-12 | Stop reason: CLARIF

## 2019-01-29 ENCOUNTER — HOSPITAL ENCOUNTER (OUTPATIENT)
Dept: PHYSICAL THERAPY | Age: 74
Setting detail: THERAPIES SERIES
Discharge: HOME | End: 2019-01-29
Payer: MEDICARE

## 2019-02-05 ENCOUNTER — HOSPITAL ENCOUNTER (OUTPATIENT)
Dept: PHYSICAL THERAPY | Age: 74
Setting detail: THERAPIES SERIES
Discharge: HOME | End: 2019-02-05
Payer: MEDICARE

## 2019-02-05 PROCEDURE — G8979 MOBILITY GOAL STATUS: HCPCS | Performed by: PHYSICAL THERAPIST

## 2019-02-05 PROCEDURE — 97110 THERAPEUTIC EXERCISES: CPT | Performed by: PHYSICAL THERAPIST

## 2019-02-05 PROCEDURE — G8978 MOBILITY CURRENT STATUS: HCPCS | Performed by: PHYSICAL THERAPIST

## 2019-02-05 PROCEDURE — 97161 PT EVAL LOW COMPLEX 20 MIN: CPT | Performed by: PHYSICAL THERAPIST

## 2019-02-08 ENCOUNTER — HOSPITAL ENCOUNTER (OUTPATIENT)
Dept: PHYSICAL THERAPY | Age: 74
Setting detail: THERAPIES SERIES
Discharge: HOME | End: 2019-02-08
Payer: MEDICARE

## 2019-02-08 PROCEDURE — 97140 MANUAL THERAPY 1/> REGIONS: CPT

## 2019-02-08 PROCEDURE — 97110 THERAPEUTIC EXERCISES: CPT

## 2019-02-12 ENCOUNTER — HOSPITAL ENCOUNTER (OUTPATIENT)
Dept: PHYSICAL THERAPY | Age: 74
Setting detail: THERAPIES SERIES
Discharge: HOME | End: 2019-02-12
Payer: MEDICARE

## 2019-02-12 PROCEDURE — 97140 MANUAL THERAPY 1/> REGIONS: CPT | Performed by: PHYSICAL THERAPIST

## 2019-02-12 PROCEDURE — 97110 THERAPEUTIC EXERCISES: CPT | Performed by: PHYSICAL THERAPIST

## 2019-02-14 ENCOUNTER — TELEPHONE (OUTPATIENT)
Dept: INTERNAL MEDICINE CLINIC | Age: 74
End: 2019-02-14

## 2019-02-15 ENCOUNTER — HOSPITAL ENCOUNTER (OUTPATIENT)
Dept: PHYSICAL THERAPY | Age: 74
Setting detail: THERAPIES SERIES
Discharge: HOME | End: 2019-02-15
Payer: MEDICARE

## 2019-02-15 PROCEDURE — 97110 THERAPEUTIC EXERCISES: CPT

## 2019-02-19 ENCOUNTER — HOSPITAL ENCOUNTER (OUTPATIENT)
Dept: PHYSICAL THERAPY | Age: 74
Setting detail: THERAPIES SERIES
Discharge: HOME | End: 2019-02-19
Payer: MEDICARE

## 2019-02-19 PROCEDURE — 97110 THERAPEUTIC EXERCISES: CPT | Performed by: PHYSICAL THERAPIST

## 2019-02-22 ENCOUNTER — HOSPITAL ENCOUNTER (OUTPATIENT)
Dept: PHYSICAL THERAPY | Age: 74
Setting detail: THERAPIES SERIES
Discharge: HOME | End: 2019-02-22
Payer: MEDICARE

## 2019-02-22 PROCEDURE — 97110 THERAPEUTIC EXERCISES: CPT

## 2019-02-26 ENCOUNTER — HOSPITAL ENCOUNTER (OUTPATIENT)
Dept: PHYSICAL THERAPY | Age: 74
Setting detail: THERAPIES SERIES
Discharge: HOME | End: 2019-02-26
Payer: MEDICARE

## 2019-02-26 PROCEDURE — 97110 THERAPEUTIC EXERCISES: CPT | Performed by: PHYSICAL THERAPIST

## 2019-02-26 PROCEDURE — 97112 NEUROMUSCULAR REEDUCATION: CPT | Performed by: PHYSICAL THERAPIST

## 2019-02-27 DIAGNOSIS — I42.9 CARDIOMYOPATHY, UNSPECIFIED TYPE (HCC): ICD-10-CM

## 2019-02-27 DIAGNOSIS — I25.10 CORONARY ARTERY DISEASE INVOLVING NATIVE CORONARY ARTERY OF NATIVE HEART WITHOUT ANGINA PECTORIS: Chronic | ICD-10-CM

## 2019-03-04 ENCOUNTER — OFFICE VISIT (OUTPATIENT)
Dept: RHEUMATOLOGY | Age: 74
End: 2019-03-04
Payer: MEDICARE

## 2019-03-04 VITALS
HEART RATE: 80 BPM | SYSTOLIC BLOOD PRESSURE: 120 MMHG | DIASTOLIC BLOOD PRESSURE: 65 MMHG | BODY MASS INDEX: 31.82 KG/M2 | WEIGHT: 191 LBS | HEIGHT: 65 IN

## 2019-03-04 DIAGNOSIS — Z79.899 ENCOUNTER FOR LONG-TERM (CURRENT) USE OF HIGH-RISK MEDICATION: ICD-10-CM

## 2019-03-04 DIAGNOSIS — L40.50 PSORIATIC ARTHRITIS (HCC): Primary | ICD-10-CM

## 2019-03-04 DIAGNOSIS — Z51.81 ENCOUNTER FOR THERAPEUTIC DRUG MONITORING: ICD-10-CM

## 2019-03-04 LAB
ALBUMIN SERPL-MCNC: 3.8 G/DL (ref 3.4–5)
ALP BLD-CCNC: 87 U/L (ref 40–129)
ALT SERPL-CCNC: 19 U/L (ref 10–40)
AST SERPL-CCNC: 28 U/L (ref 15–37)
BASOPHILS ABSOLUTE: 0.1 K/UL (ref 0–0.2)
BASOPHILS RELATIVE PERCENT: 1.2 %
BILIRUB SERPL-MCNC: 0.6 MG/DL (ref 0–1)
BILIRUBIN DIRECT: <0.2 MG/DL (ref 0–0.3)
BILIRUBIN, INDIRECT: NORMAL MG/DL (ref 0–1)
CREAT SERPL-MCNC: 0.7 MG/DL (ref 0.6–1.2)
EOSINOPHILS ABSOLUTE: 0.4 K/UL (ref 0–0.6)
EOSINOPHILS RELATIVE PERCENT: 4.7 %
GFR AFRICAN AMERICAN: >60
GFR NON-AFRICAN AMERICAN: >60
HCT VFR BLD CALC: 36.8 % (ref 36–48)
HEMOGLOBIN: 12 G/DL (ref 12–16)
LYMPHOCYTES ABSOLUTE: 1.5 K/UL (ref 1–5.1)
LYMPHOCYTES RELATIVE PERCENT: 18.6 %
MCH RBC QN AUTO: 33.9 PG (ref 26–34)
MCHC RBC AUTO-ENTMCNC: 32.6 G/DL (ref 31–36)
MCV RBC AUTO: 104 FL (ref 80–100)
MONOCYTES ABSOLUTE: 0.8 K/UL (ref 0–1.3)
MONOCYTES RELATIVE PERCENT: 9.8 %
NEUTROPHILS ABSOLUTE: 5.1 K/UL (ref 1.7–7.7)
NEUTROPHILS RELATIVE PERCENT: 65.7 %
PDW BLD-RTO: 18 % (ref 12.4–15.4)
PLATELET # BLD: 276 K/UL (ref 135–450)
PMV BLD AUTO: 8.9 FL (ref 5–10.5)
RBC # BLD: 3.53 M/UL (ref 4–5.2)
TOTAL PROTEIN: 6.5 G/DL (ref 6.4–8.2)
WBC # BLD: 7.8 K/UL (ref 4–11)

## 2019-03-04 PROCEDURE — 1123F ACP DISCUSS/DSCN MKR DOCD: CPT | Performed by: INTERNAL MEDICINE

## 2019-03-04 PROCEDURE — 4040F PNEUMOC VAC/ADMIN/RCVD: CPT | Performed by: INTERNAL MEDICINE

## 2019-03-04 PROCEDURE — G8400 PT W/DXA NO RESULTS DOC: HCPCS | Performed by: INTERNAL MEDICINE

## 2019-03-04 PROCEDURE — G8417 CALC BMI ABV UP PARAM F/U: HCPCS | Performed by: INTERNAL MEDICINE

## 2019-03-04 PROCEDURE — 99214 OFFICE O/P EST MOD 30 MIN: CPT | Performed by: INTERNAL MEDICINE

## 2019-03-04 PROCEDURE — 1101F PT FALLS ASSESS-DOCD LE1/YR: CPT | Performed by: INTERNAL MEDICINE

## 2019-03-04 PROCEDURE — 1090F PRES/ABSN URINE INCON ASSESS: CPT | Performed by: INTERNAL MEDICINE

## 2019-03-04 PROCEDURE — G8427 DOCREV CUR MEDS BY ELIG CLIN: HCPCS | Performed by: INTERNAL MEDICINE

## 2019-03-04 PROCEDURE — 3017F COLORECTAL CA SCREEN DOC REV: CPT | Performed by: INTERNAL MEDICINE

## 2019-03-04 PROCEDURE — G8484 FLU IMMUNIZE NO ADMIN: HCPCS | Performed by: INTERNAL MEDICINE

## 2019-03-04 PROCEDURE — G8598 ASA/ANTIPLAT THER USED: HCPCS | Performed by: INTERNAL MEDICINE

## 2019-03-04 PROCEDURE — 1036F TOBACCO NON-USER: CPT | Performed by: INTERNAL MEDICINE

## 2019-03-04 RX ORDER — FOLIC ACID 1 MG/1
1 TABLET ORAL DAILY
Qty: 30 TABLET | Refills: 5 | Status: SHIPPED | OUTPATIENT
Start: 2019-03-04 | End: 2019-10-25 | Stop reason: SDUPTHER

## 2019-03-05 ENCOUNTER — TELEPHONE (OUTPATIENT)
Dept: PHYSICAL THERAPY | Age: 74
End: 2019-03-05

## 2019-03-05 RX ORDER — CARVEDILOL 25 MG/1
TABLET ORAL
Qty: 90 TABLET | OUTPATIENT
Start: 2019-03-05

## 2019-03-05 RX ORDER — SPIRONOLACTONE 25 MG/1
25 TABLET ORAL DAILY
Qty: 90 TABLET | OUTPATIENT
Start: 2019-03-05

## 2019-03-12 ENCOUNTER — OFFICE VISIT (OUTPATIENT)
Dept: CARDIOLOGY CLINIC | Age: 74
End: 2019-03-12
Payer: MEDICARE

## 2019-03-12 VITALS
DIASTOLIC BLOOD PRESSURE: 70 MMHG | WEIGHT: 192 LBS | HEIGHT: 65 IN | BODY MASS INDEX: 31.99 KG/M2 | HEART RATE: 85 BPM | SYSTOLIC BLOOD PRESSURE: 112 MMHG | OXYGEN SATURATION: 95 %

## 2019-03-12 DIAGNOSIS — I25.10 CORONARY ARTERY DISEASE INVOLVING NATIVE CORONARY ARTERY OF NATIVE HEART WITHOUT ANGINA PECTORIS: Chronic | ICD-10-CM

## 2019-03-12 DIAGNOSIS — I25.5 ISCHEMIC CARDIOMYOPATHY: Chronic | ICD-10-CM

## 2019-03-12 DIAGNOSIS — I10 ESSENTIAL HYPERTENSION: Primary | Chronic | ICD-10-CM

## 2019-03-12 DIAGNOSIS — E78.2 MIXED HYPERLIPIDEMIA: Chronic | ICD-10-CM

## 2019-03-12 PROCEDURE — G8598 ASA/ANTIPLAT THER USED: HCPCS | Performed by: NURSE PRACTITIONER

## 2019-03-12 PROCEDURE — G8417 CALC BMI ABV UP PARAM F/U: HCPCS | Performed by: NURSE PRACTITIONER

## 2019-03-12 PROCEDURE — 3017F COLORECTAL CA SCREEN DOC REV: CPT | Performed by: NURSE PRACTITIONER

## 2019-03-12 PROCEDURE — 1036F TOBACCO NON-USER: CPT | Performed by: NURSE PRACTITIONER

## 2019-03-12 PROCEDURE — G8400 PT W/DXA NO RESULTS DOC: HCPCS | Performed by: NURSE PRACTITIONER

## 2019-03-12 PROCEDURE — 99214 OFFICE O/P EST MOD 30 MIN: CPT | Performed by: NURSE PRACTITIONER

## 2019-03-12 PROCEDURE — G8427 DOCREV CUR MEDS BY ELIG CLIN: HCPCS | Performed by: NURSE PRACTITIONER

## 2019-03-12 PROCEDURE — 1123F ACP DISCUSS/DSCN MKR DOCD: CPT | Performed by: NURSE PRACTITIONER

## 2019-03-12 PROCEDURE — 1101F PT FALLS ASSESS-DOCD LE1/YR: CPT | Performed by: NURSE PRACTITIONER

## 2019-03-12 PROCEDURE — 4040F PNEUMOC VAC/ADMIN/RCVD: CPT | Performed by: NURSE PRACTITIONER

## 2019-03-12 PROCEDURE — 1090F PRES/ABSN URINE INCON ASSESS: CPT | Performed by: NURSE PRACTITIONER

## 2019-03-12 PROCEDURE — G8484 FLU IMMUNIZE NO ADMIN: HCPCS | Performed by: NURSE PRACTITIONER

## 2019-03-22 ENCOUNTER — HOSPITAL ENCOUNTER (OUTPATIENT)
Dept: MRI IMAGING | Age: 74
Discharge: HOME | End: 2019-03-22
Payer: MEDICARE

## 2019-03-22 ENCOUNTER — HOSPITAL ENCOUNTER (OUTPATIENT)
Dept: WOMENS IMAGING | Age: 74
Discharge: HOME | End: 2019-03-22
Payer: MEDICARE

## 2019-03-22 ENCOUNTER — HOSPITAL ENCOUNTER (OUTPATIENT)
Dept: GENERAL RADIOLOGY | Age: 74
Discharge: HOME | End: 2019-03-22
Payer: MEDICARE

## 2019-03-22 DIAGNOSIS — Z12.39 BREAST CANCER SCREENING: ICD-10-CM

## 2019-03-22 DIAGNOSIS — M51.36 ANNULAR TEAR OF LUMBAR DISC: ICD-10-CM

## 2019-03-22 DIAGNOSIS — Z78.0 MENOPAUSE: ICD-10-CM

## 2019-03-22 PROCEDURE — 77080 DXA BONE DENSITY AXIAL: CPT

## 2019-03-22 PROCEDURE — 72148 MRI LUMBAR SPINE W/O DYE: CPT

## 2019-03-22 PROCEDURE — 77063 BREAST TOMOSYNTHESIS BI: CPT

## 2019-03-26 ENCOUNTER — HOSPITAL ENCOUNTER (OUTPATIENT)
Dept: WOMENS IMAGING | Age: 74
Discharge: HOME | End: 2019-03-26
Payer: MEDICARE

## 2019-03-26 DIAGNOSIS — R92.8 ABNORMAL FINDING ON MAMMOGRAPHY: ICD-10-CM

## 2019-03-26 PROCEDURE — 77065 DX MAMMO INCL CAD UNI: CPT

## 2019-03-29 ENCOUNTER — HOSPITAL ENCOUNTER (OUTPATIENT)
Dept: WOMENS IMAGING | Age: 74
Discharge: HOME | End: 2019-03-29
Payer: MEDICARE

## 2019-03-29 DIAGNOSIS — R92.8 ABNORMAL MAMMOGRAM: ICD-10-CM

## 2019-03-29 DIAGNOSIS — R92.0 ABNORMAL FINDING ON MAMMOGRAPHY, MICROCALCIFICATION: ICD-10-CM

## 2019-04-08 ENCOUNTER — HOSPITAL ENCOUNTER (OUTPATIENT)
Dept: WOMENS IMAGING | Age: 74
Discharge: HOME | End: 2019-04-08
Payer: MEDICARE

## 2019-04-08 DIAGNOSIS — R92.8 ABNORMAL MAMMOGRAM: ICD-10-CM

## 2019-04-08 PROCEDURE — 88341 IMHCHEM/IMCYTCHM EA ADD ANTB: CPT

## 2019-04-08 PROCEDURE — 76098 X-RAY EXAM SURGICAL SPECIMEN: CPT

## 2019-04-08 PROCEDURE — 77065 DX MAMMO INCL CAD UNI: CPT

## 2019-04-08 PROCEDURE — 88305 TISSUE EXAM BY PATHOLOGIST: CPT

## 2019-04-08 PROCEDURE — 88342 IMHCHEM/IMCYTCHM 1ST ANTB: CPT

## 2019-04-08 PROCEDURE — 2720000010 MAM STEREO BREAST BX W LOC DEVICE 1ST LESION LEFT

## 2019-04-08 NOTE — PROGRESS NOTES
Patient here for breast biopsy. NN reviewed the health history, allergies and medications. Family member          daughter  drove her. Radiologist reviews procedure with patient, consent signed. Patient tolerates procedure well. Compression held. Site cleansed with chloraprep, steri strips and dry dressing applied. Ice pack in place. Reviewed discharge instructions with patient and signed copy. Patient verbalized understanding and agreed to contact Nurse Navigator with any questions. Patient A&Ox3, steady on feet and discharged home.

## 2019-04-16 ENCOUNTER — TELEPHONE (OUTPATIENT)
Dept: INTERNAL MEDICINE CLINIC | Age: 74
End: 2019-04-16

## 2019-04-16 RX ORDER — MELOXICAM 7.5 MG/1
7.5 TABLET ORAL DAILY
Qty: 90 TABLET | Refills: 0 | Status: SHIPPED | OUTPATIENT
Start: 2019-04-16 | End: 2019-09-04 | Stop reason: SDUPTHER

## 2019-04-16 NOTE — TELEPHONE ENCOUNTER
Pt calling for refill of Meloxicam  Wanting 90 day supply please send to [facility]  ---Thanks.

## 2019-06-04 ENCOUNTER — OFFICE VISIT (OUTPATIENT)
Dept: RHEUMATOLOGY | Age: 74
End: 2019-06-04
Payer: MEDICARE

## 2019-06-04 VITALS
WEIGHT: 194.38 LBS | SYSTOLIC BLOOD PRESSURE: 128 MMHG | HEART RATE: 88 BPM | DIASTOLIC BLOOD PRESSURE: 76 MMHG | HEIGHT: 65 IN | BODY MASS INDEX: 32.39 KG/M2

## 2019-06-04 DIAGNOSIS — Z51.81 ENCOUNTER FOR THERAPEUTIC DRUG MONITORING: ICD-10-CM

## 2019-06-04 DIAGNOSIS — Z79.899 ENCOUNTER FOR LONG-TERM (CURRENT) USE OF HIGH-RISK MEDICATION: ICD-10-CM

## 2019-06-04 DIAGNOSIS — L40.50 PSORIATIC ARTHRITIS (HCC): Primary | ICD-10-CM

## 2019-06-04 LAB
ALBUMIN SERPL-MCNC: 4.3 G/DL (ref 3.4–5)
ALP BLD-CCNC: 90 U/L (ref 40–129)
ALT SERPL-CCNC: 11 U/L (ref 10–40)
AST SERPL-CCNC: 19 U/L (ref 15–37)
BASOPHILS ABSOLUTE: 0 K/UL (ref 0–0.2)
BASOPHILS RELATIVE PERCENT: 0 %
BILIRUB SERPL-MCNC: 0.6 MG/DL (ref 0–1)
BILIRUBIN DIRECT: <0.2 MG/DL (ref 0–0.3)
BILIRUBIN, INDIRECT: NORMAL MG/DL (ref 0–1)
CREAT SERPL-MCNC: 0.8 MG/DL (ref 0.6–1.2)
EOSINOPHILS ABSOLUTE: 0 K/UL (ref 0–0.6)
EOSINOPHILS RELATIVE PERCENT: 0 %
GFR AFRICAN AMERICAN: >60
GFR NON-AFRICAN AMERICAN: >60
HCT VFR BLD CALC: 36.2 % (ref 36–48)
HEMOGLOBIN: 11.7 G/DL (ref 12–16)
LYMPHOCYTES ABSOLUTE: 0.6 K/UL (ref 1–5.1)
LYMPHOCYTES RELATIVE PERCENT: 3.7 %
MCH RBC QN AUTO: 33.5 PG (ref 26–34)
MCHC RBC AUTO-ENTMCNC: 32.3 G/DL (ref 31–36)
MCV RBC AUTO: 103.6 FL (ref 80–100)
MONOCYTES ABSOLUTE: 0.2 K/UL (ref 0–1.3)
MONOCYTES RELATIVE PERCENT: 1.2 %
NEUTROPHILS ABSOLUTE: 15.5 K/UL (ref 1.7–7.7)
NEUTROPHILS RELATIVE PERCENT: 95.1 %
PDW BLD-RTO: 15.3 % (ref 12.4–15.4)
PLATELET # BLD: 317 K/UL (ref 135–450)
PMV BLD AUTO: 8.3 FL (ref 5–10.5)
RBC # BLD: 3.49 M/UL (ref 4–5.2)
TOTAL PROTEIN: 7.2 G/DL (ref 6.4–8.2)
WBC # BLD: 16.3 K/UL (ref 4–11)

## 2019-06-04 PROCEDURE — G8417 CALC BMI ABV UP PARAM F/U: HCPCS | Performed by: INTERNAL MEDICINE

## 2019-06-04 PROCEDURE — 99214 OFFICE O/P EST MOD 30 MIN: CPT | Performed by: INTERNAL MEDICINE

## 2019-06-04 PROCEDURE — G8598 ASA/ANTIPLAT THER USED: HCPCS | Performed by: INTERNAL MEDICINE

## 2019-06-04 PROCEDURE — 3014F SCREEN MAMMO DOC REV: CPT | Performed by: INTERNAL MEDICINE

## 2019-06-04 PROCEDURE — G8427 DOCREV CUR MEDS BY ELIG CLIN: HCPCS | Performed by: INTERNAL MEDICINE

## 2019-06-04 PROCEDURE — 4040F PNEUMOC VAC/ADMIN/RCVD: CPT | Performed by: INTERNAL MEDICINE

## 2019-06-04 PROCEDURE — 1036F TOBACCO NON-USER: CPT | Performed by: INTERNAL MEDICINE

## 2019-06-04 PROCEDURE — 1090F PRES/ABSN URINE INCON ASSESS: CPT | Performed by: INTERNAL MEDICINE

## 2019-06-04 PROCEDURE — 1123F ACP DISCUSS/DSCN MKR DOCD: CPT | Performed by: INTERNAL MEDICINE

## 2019-06-04 PROCEDURE — 3017F COLORECTAL CA SCREEN DOC REV: CPT | Performed by: INTERNAL MEDICINE

## 2019-06-04 PROCEDURE — G8399 PT W/DXA RESULTS DOCUMENT: HCPCS | Performed by: INTERNAL MEDICINE

## 2019-06-04 NOTE — PROGRESS NOTES
SUBJECTIVE:    Patient ID: Sina Fountain is a 68 y.o. female. Patient returns for follow-up of psoriatic arthritis. Since Discontinuing Humira she's developed increased skin lesions on both hands primarily palmar surface involving the thumbs . She continues on methotrexate 15 mg a week. And Meloxicam 7.5 mg a day. She just had epidural steroid injections. Review of Systems:    Respiratory: denies cough, denies shortness of breath  Gastrointestinal: denies abdominal pain, denies nausea  Musculoskeletal:                  no       Morning stiffness  Ears, nose, mouth: denies mucosal sores  Skin: denies rash, denies alopecia. The remainder of her review of systems  Is  negative. Prior to Visit Medications    Medication Sig Taking?  Authorizing Provider   methotrexate (RHEUMATREX) 2.5 MG chemo tablet TAKE 6 TABLETS BY MOUTH  ONCE A WEEK Yes Arslan Garcia MD   meloxicam (MOBIC) 7.5 MG tablet Take 1 tablet by mouth daily Yes Arslan Garcia MD   folic acid (FOLVITE) 1 MG tablet Take 1 tablet by mouth daily Yes Arslan Garcia MD   acetaminophen (TYLENOL) 500 MG tablet Take 500 mg by mouth every 6 hours as needed for Pain Yes Historical Provider, MD   carvedilol (COREG) 25 MG tablet TAKE ONE-HALF TABLET BY  MOUTH TWICE A DAY Yes Jaun Gates MD   losartan (COZAAR) 50 MG tablet TAKE 1 TABLET BY MOUTH TWO  TIMES DAILY Yes Jaun Gates MD   spironolactone (ALDACTONE) 25 MG tablet Take 1 tablet by mouth daily Yes Jaun Gates MD   gabapentin (NEURONTIN) 100 MG capsule Take 100 mg by mouth 3 times daily Yes Historical Provider, MD   furosemide (LASIX) 40 MG tablet Take 1 tablet by mouth daily  Patient taking differently: Take 40 mg by mouth daily as needed  Yes Jaun Gates MD   umeclidinium-vilanterol (ANORO ELLIPTA) 62.5-25 MCG/INH AEPB inhaler Inhale 1 puff into the lungs daily Yes Yobany Harding MD   aspirin 81 MG tablet Take 81 mg by mouth every other day  Yes Historical Provider, MD HYDROcodone-acetaminophen (NORCO) 5-325 MG per tablet Take 1 tablet by mouth every 6 hours as needed for Pain. Yes Historical Provider, MD   venlafaxine (EFFEXOR) 75 MG tablet Take 75 mg by mouth daily. Yes Historical Provider, MD   diclofenac sodium 1 % GEL Apply 4 g topically 4 times daily. Yes Alex Ramos MD   Insulin Glargine (LANTUS SC) Inject 25 Units into the skin 2 times daily. Yes Historical Provider, MD   Insulin Aspart (NOVOLOG FLEXPEN SC) Inject  into the skin. Yes Historical Provider, MD   levothyroxine (SYNTHROID) 100 MCG tablet Take 75 mcg by mouth daily  Yes Historical Provider, MD   estrogens, conjugated, (PREMARIN) 0.625 MG tablet Take 0.625 mg by mouth every 72 hours. Yes Historical Provider, MD   calcium carbonate (OSCAL) 500 MG TABS tablet Take 500 mg by mouth daily. Yes Historical Provider, MD   perphenazine 2 MG tablet Take 2 mg by mouth nightly. Yes Historical Provider, MD        OBJECTIVE:  /76   Pulse 88   Ht 5' 5\" (1.651 m)   Wt 194 lb 6 oz (88.2 kg)   BMI 32.35 kg/m²      Physical Exam:    General: the patient demonstrated normal gait and posture without evidence of overt muscle wasting. Hygiene appears normal    Ears, mouth, nose, throat: no mucosal sores      Respiratory: assessment of the patient's respiratory effort showed no evidence of abnormal respiration and no use of accessory muscles. Diaphragmatic movement is normal.  Percussion of the patient's chest showed no evidence of dullness flatness or hyperresonance. Auscultation of the patient's lungs reveal normal breath sounds in all lung fields. There is no evidence of abnormal sounds such as rales or wheezes. There is no evidence of friction rub. Cardiovascular: palpation of the patient's chest revealed no abnormal thrill. The point of maximal impulse showed no displacement. Auscultation of the heart revealed no evidence of murmur gallop or abnormal heart sound.     Musculoskeletal: 1+ soft tissue swelling wrists no swelling PIPs no definite swelling knees fists 100%   Skin: Psoriatic lesions on the palmar surface of both thumbs    ASSESSMENT: Psoriatic arthritis. Chemotherapy        PLAN:   Blood work was obtained today to monitor for adverse drug reactions. Laboratory studies from last visit were reviewed. I'll see patient back in 3 months time.   In the interim she'll review literature on Taltz

## 2019-07-09 ENCOUNTER — TELEPHONE (OUTPATIENT)
Dept: INTERNAL MEDICINE CLINIC | Age: 74
End: 2019-07-09

## 2019-07-21 DIAGNOSIS — I42.9 CARDIOMYOPATHY, UNSPECIFIED TYPE (HCC): ICD-10-CM

## 2019-07-21 DIAGNOSIS — I25.10 CORONARY ARTERY DISEASE INVOLVING NATIVE CORONARY ARTERY OF NATIVE HEART WITHOUT ANGINA PECTORIS: Chronic | ICD-10-CM

## 2019-07-21 DIAGNOSIS — I10 ESSENTIAL HYPERTENSION: Chronic | ICD-10-CM

## 2019-07-22 ENCOUNTER — TELEPHONE (OUTPATIENT)
Dept: PULMONOLOGY | Age: 74
End: 2019-07-22

## 2019-07-22 DIAGNOSIS — I42.9 CARDIOMYOPATHY, UNSPECIFIED TYPE (HCC): ICD-10-CM

## 2019-07-22 DIAGNOSIS — I25.10 CORONARY ARTERY DISEASE INVOLVING NATIVE CORONARY ARTERY OF NATIVE HEART WITHOUT ANGINA PECTORIS: Chronic | ICD-10-CM

## 2019-07-22 DIAGNOSIS — I10 ESSENTIAL HYPERTENSION: Chronic | ICD-10-CM

## 2019-07-22 RX ORDER — CARVEDILOL 25 MG/1
TABLET ORAL
Qty: 90 TABLET | Refills: 3 | Status: SHIPPED | OUTPATIENT
Start: 2019-07-22 | End: 2020-06-09

## 2019-07-22 RX ORDER — LOSARTAN POTASSIUM 50 MG/1
TABLET ORAL
Qty: 60 TABLET | Refills: 0 | Status: SHIPPED | OUTPATIENT
Start: 2019-07-22 | End: 2019-08-09 | Stop reason: SDUPTHER

## 2019-07-22 RX ORDER — SPIRONOLACTONE 25 MG/1
25 TABLET ORAL DAILY
Qty: 90 TABLET | Refills: 3 | Status: SHIPPED | OUTPATIENT
Start: 2019-07-22 | End: 2020-06-09

## 2019-07-22 RX ORDER — LOSARTAN POTASSIUM 50 MG/1
TABLET ORAL
Qty: 180 TABLET | Refills: 3 | OUTPATIENT
Start: 2019-07-22

## 2019-07-22 RX ORDER — DOXYCYCLINE HYCLATE 100 MG/1
100 CAPSULE ORAL DAILY
Qty: 10 CAPSULE | Refills: 0 | OUTPATIENT
Start: 2019-07-22 | End: 2019-08-01

## 2019-07-22 NOTE — TELEPHONE ENCOUNTER
C/o productive cough with yellow sputum, wheezing, chest congestion that started last week. She has not had a fever, using her inhalers and nebulizer BID. I offered appt as last seen 9/18 but she states she is unable to come in due to back issues. Pt uses Fil[employer].  Pt # [id number]

## 2019-08-08 ENCOUNTER — TELEPHONE (OUTPATIENT)
Dept: PULMONOLOGY | Age: 74
End: 2019-08-08

## 2019-08-08 ENCOUNTER — OFFICE VISIT (OUTPATIENT)
Dept: PULMONOLOGY | Age: 74
End: 2019-08-08
Payer: MEDICARE

## 2019-08-08 VITALS
WEIGHT: 195 LBS | HEART RATE: 81 BPM | BODY MASS INDEX: 32.45 KG/M2 | DIASTOLIC BLOOD PRESSURE: 66 MMHG | OXYGEN SATURATION: 94 % | SYSTOLIC BLOOD PRESSURE: 111 MMHG

## 2019-08-08 DIAGNOSIS — J44.1 COPD EXACERBATION (HCC): Primary | ICD-10-CM

## 2019-08-08 DIAGNOSIS — L40.9 PSORIASIS: ICD-10-CM

## 2019-08-08 DIAGNOSIS — J06.9 UPPER RESPIRATORY TRACT INFECTION, UNSPECIFIED TYPE: ICD-10-CM

## 2019-08-08 PROCEDURE — G8399 PT W/DXA RESULTS DOCUMENT: HCPCS | Performed by: NURSE PRACTITIONER

## 2019-08-08 PROCEDURE — 4040F PNEUMOC VAC/ADMIN/RCVD: CPT | Performed by: NURSE PRACTITIONER

## 2019-08-08 PROCEDURE — 1090F PRES/ABSN URINE INCON ASSESS: CPT | Performed by: NURSE PRACTITIONER

## 2019-08-08 PROCEDURE — 99214 OFFICE O/P EST MOD 30 MIN: CPT | Performed by: NURSE PRACTITIONER

## 2019-08-08 PROCEDURE — 3023F SPIROM DOC REV: CPT | Performed by: NURSE PRACTITIONER

## 2019-08-08 PROCEDURE — 1123F ACP DISCUSS/DSCN MKR DOCD: CPT | Performed by: NURSE PRACTITIONER

## 2019-08-08 PROCEDURE — G8598 ASA/ANTIPLAT THER USED: HCPCS | Performed by: NURSE PRACTITIONER

## 2019-08-08 PROCEDURE — G8427 DOCREV CUR MEDS BY ELIG CLIN: HCPCS | Performed by: NURSE PRACTITIONER

## 2019-08-08 PROCEDURE — 3017F COLORECTAL CA SCREEN DOC REV: CPT | Performed by: NURSE PRACTITIONER

## 2019-08-08 PROCEDURE — G8417 CALC BMI ABV UP PARAM F/U: HCPCS | Performed by: NURSE PRACTITIONER

## 2019-08-08 PROCEDURE — 1036F TOBACCO NON-USER: CPT | Performed by: NURSE PRACTITIONER

## 2019-08-08 PROCEDURE — G8926 SPIRO NO PERF OR DOC: HCPCS | Performed by: NURSE PRACTITIONER

## 2019-08-08 PROCEDURE — 3014F SCREEN MAMMO DOC REV: CPT | Performed by: NURSE PRACTITIONER

## 2019-08-08 RX ORDER — PREDNISONE 10 MG/1
TABLET ORAL
Qty: 30 TABLET | Refills: 0 | Status: SHIPPED | OUTPATIENT
Start: 2019-08-08 | End: 2019-08-18

## 2019-08-08 ASSESSMENT — ENCOUNTER SYMPTOMS
WHEEZING: 1
COLOR CHANGE: 0
COUGH: 1
SHORTNESS OF BREATH: 1
CONSTIPATION: 0
ABDOMINAL PAIN: 0

## 2019-08-09 ENCOUNTER — TELEPHONE (OUTPATIENT)
Dept: CARDIOLOGY CLINIC | Age: 74
End: 2019-08-09

## 2019-08-09 DIAGNOSIS — I10 ESSENTIAL HYPERTENSION: Chronic | ICD-10-CM

## 2019-08-09 DIAGNOSIS — I42.9 CARDIOMYOPATHY, UNSPECIFIED TYPE (HCC): ICD-10-CM

## 2019-08-09 DIAGNOSIS — I25.10 CORONARY ARTERY DISEASE INVOLVING NATIVE CORONARY ARTERY OF NATIVE HEART WITHOUT ANGINA PECTORIS: Chronic | ICD-10-CM

## 2019-08-09 RX ORDER — LOSARTAN POTASSIUM 50 MG/1
TABLET ORAL
Qty: 180 TABLET | Refills: 4 | Status: SHIPPED | OUTPATIENT
Start: 2019-08-09 | End: 2020-06-19

## 2019-08-15 ENCOUNTER — HOSPITAL ENCOUNTER (OUTPATIENT)
Age: 74
Discharge: HOME | End: 2019-08-15
Payer: MEDICARE

## 2019-08-15 ENCOUNTER — TELEPHONE (OUTPATIENT)
Dept: INTERNAL MEDICINE CLINIC | Age: 74
End: 2019-08-15

## 2019-08-15 ENCOUNTER — HOSPITAL ENCOUNTER (OUTPATIENT)
Dept: CT IMAGING | Age: 74
Discharge: HOME | End: 2019-08-15
Payer: MEDICARE

## 2019-08-15 DIAGNOSIS — J06.9 UPPER RESPIRATORY TRACT INFECTION, UNSPECIFIED TYPE: ICD-10-CM

## 2019-08-15 DIAGNOSIS — I10 ESSENTIAL HYPERTENSION: Chronic | ICD-10-CM

## 2019-08-15 DIAGNOSIS — L40.9 PSORIASIS: ICD-10-CM

## 2019-08-15 LAB
ANION GAP SERPL CALCULATED.3IONS-SCNC: 12 MMOL/L (ref 3–16)
BUN BLDV-MCNC: 20 MG/DL (ref 7–20)
CALCIUM SERPL-MCNC: 9.3 MG/DL (ref 8.3–10.6)
CHLORIDE BLD-SCNC: 99 MMOL/L (ref 99–110)
CO2: 24 MMOL/L (ref 21–32)
CREAT SERPL-MCNC: 0.9 MG/DL (ref 0.6–1.2)
GFR AFRICAN AMERICAN: >60
GFR NON-AFRICAN AMERICAN: >60
GLUCOSE BLD-MCNC: 253 MG/DL (ref 70–99)
POTASSIUM SERPL-SCNC: 5 MMOL/L (ref 3.5–5.1)
SODIUM BLD-SCNC: 135 MMOL/L (ref 136–145)

## 2019-08-15 PROCEDURE — 36415 COLL VENOUS BLD VENIPUNCTURE: CPT

## 2019-08-15 PROCEDURE — 80048 BASIC METABOLIC PNL TOTAL CA: CPT

## 2019-08-15 PROCEDURE — 71250 CT THORAX DX C-: CPT

## 2019-08-16 ENCOUNTER — TELEPHONE (OUTPATIENT)
Dept: CARDIOLOGY CLINIC | Age: 74
End: 2019-08-16

## 2019-08-16 ENCOUNTER — TELEPHONE (OUTPATIENT)
Dept: RHEUMATOLOGY | Age: 74
End: 2019-08-16

## 2019-08-21 ENCOUNTER — OFFICE VISIT (OUTPATIENT)
Dept: PULMONOLOGY | Age: 74
End: 2019-08-21
Payer: MEDICARE

## 2019-08-21 VITALS
DIASTOLIC BLOOD PRESSURE: 60 MMHG | BODY MASS INDEX: 32.28 KG/M2 | HEART RATE: 75 BPM | OXYGEN SATURATION: 97 % | SYSTOLIC BLOOD PRESSURE: 110 MMHG | WEIGHT: 194 LBS

## 2019-08-21 DIAGNOSIS — R91.8 PULMONARY NODULES: ICD-10-CM

## 2019-08-21 DIAGNOSIS — R06.02 SOB (SHORTNESS OF BREATH): Primary | ICD-10-CM

## 2019-08-21 DIAGNOSIS — J44.9 COPD, SEVERE (HCC): ICD-10-CM

## 2019-08-21 DIAGNOSIS — B37.0 ORAL THRUSH: ICD-10-CM

## 2019-08-21 DIAGNOSIS — L40.9 PSORIASIS: ICD-10-CM

## 2019-08-21 PROCEDURE — G8399 PT W/DXA RESULTS DOCUMENT: HCPCS | Performed by: NURSE PRACTITIONER

## 2019-08-21 PROCEDURE — 1036F TOBACCO NON-USER: CPT | Performed by: NURSE PRACTITIONER

## 2019-08-21 PROCEDURE — 3017F COLORECTAL CA SCREEN DOC REV: CPT | Performed by: NURSE PRACTITIONER

## 2019-08-21 PROCEDURE — 3023F SPIROM DOC REV: CPT | Performed by: NURSE PRACTITIONER

## 2019-08-21 PROCEDURE — G8598 ASA/ANTIPLAT THER USED: HCPCS | Performed by: NURSE PRACTITIONER

## 2019-08-21 PROCEDURE — G8427 DOCREV CUR MEDS BY ELIG CLIN: HCPCS | Performed by: NURSE PRACTITIONER

## 2019-08-21 PROCEDURE — 99214 OFFICE O/P EST MOD 30 MIN: CPT | Performed by: NURSE PRACTITIONER

## 2019-08-21 PROCEDURE — G8417 CALC BMI ABV UP PARAM F/U: HCPCS | Performed by: NURSE PRACTITIONER

## 2019-08-21 PROCEDURE — 1090F PRES/ABSN URINE INCON ASSESS: CPT | Performed by: NURSE PRACTITIONER

## 2019-08-21 PROCEDURE — G8926 SPIRO NO PERF OR DOC: HCPCS | Performed by: NURSE PRACTITIONER

## 2019-08-21 PROCEDURE — 3014F SCREEN MAMMO DOC REV: CPT | Performed by: NURSE PRACTITIONER

## 2019-08-21 PROCEDURE — 1123F ACP DISCUSS/DSCN MKR DOCD: CPT | Performed by: NURSE PRACTITIONER

## 2019-08-21 PROCEDURE — 4040F PNEUMOC VAC/ADMIN/RCVD: CPT | Performed by: NURSE PRACTITIONER

## 2019-08-21 ASSESSMENT — ENCOUNTER SYMPTOMS
WHEEZING: 1
CONSTIPATION: 0
COUGH: 1
ABDOMINAL PAIN: 0
SHORTNESS OF BREATH: 1
COLOR CHANGE: 0

## 2019-08-21 NOTE — PROGRESS NOTES
environmental allergies and food allergies. Psychiatric/Behavioral: Negative for agitation and confusion. Objective:       VITALS:  /60   Pulse 75   Wt 194 lb (88 kg)   SpO2 97%   BMI 32.28 kg/m²  on RA    Physical Exam   Constitutional: She is oriented to person, place, and time. She appears well-developed and well-nourished. No distress. HENT:   Head: Normocephalic. Mouth/Throat: No oropharyngeal exudate. Eyes: Pupils are equal, round, and reactive to light. Conjunctivae are normal. Right eye exhibits no discharge. Left eye exhibits no discharge. Neck: Normal range of motion. Neck supple. No tracheal deviation present. Cardiovascular: Normal rate, regular rhythm and intact distal pulses. Exam reveals no friction rub. Pulmonary/Chest: Effort normal. No accessory muscle usage or stridor. No tachypnea. No respiratory distress. She has wheezes. She has no rales. She exhibits no tenderness and no crepitus. Abdominal: Soft. Bowel sounds are normal. She exhibits no distension. There is no tenderness. Musculoskeletal: Normal range of motion. She exhibits no edema. Lymphadenopathy:     She has no cervical adenopathy. Neurological: She is alert and oriented to person, place, and time. Skin: Skin is warm and dry. No erythema. Psychiatric: She has a normal mood and affect. Thought content normal.   Vitals reviewed. DATA:      Radiology Review:  Pertinent images / reports were reviewed as a part of this visit. CT chest done 8/15/19: Innumerable very small nodules are scattered throughout both lungs. These are more pronounced in the right lung when compared to the left. The distribution appears to include centrilobular airways type nodules as well as peribronchovascular type nodules. There are areas of mucous plugging in the right upper lobe. The appearance described above is nonspecific.   Clinical considerations include a granulomatous process, autoimmune disease, Nystatin s/s  - Rinse after inhaled medications     Addendum:  D/w Dr. Saji Hutton, will plan to f/u CT chest in 1 month to evaluate for resolution of presumed infectious nodular change. Return in about 3 months (around 11/21/2019). RTC sooner if symptoms worsen.     Rod Lucas MSN APRN-ACNP CCRN

## 2019-08-27 ENCOUNTER — OFFICE VISIT (OUTPATIENT)
Dept: RHEUMATOLOGY | Age: 74
End: 2019-08-27
Payer: MEDICARE

## 2019-08-27 VITALS
WEIGHT: 193 LBS | DIASTOLIC BLOOD PRESSURE: 72 MMHG | HEIGHT: 65 IN | HEART RATE: 88 BPM | BODY MASS INDEX: 32.15 KG/M2 | SYSTOLIC BLOOD PRESSURE: 128 MMHG

## 2019-08-27 DIAGNOSIS — L40.50 PSORIATIC ARTHRITIS (HCC): Primary | ICD-10-CM

## 2019-08-27 DIAGNOSIS — Z79.899 ENCOUNTER FOR LONG-TERM (CURRENT) USE OF HIGH-RISK MEDICATION: ICD-10-CM

## 2019-08-27 DIAGNOSIS — Z51.81 ENCOUNTER FOR THERAPEUTIC DRUG MONITORING: ICD-10-CM

## 2019-08-27 LAB
ALBUMIN SERPL-MCNC: 3.8 G/DL (ref 3.4–5)
ALP BLD-CCNC: 111 U/L (ref 40–129)
ALT SERPL-CCNC: 14 U/L (ref 10–40)
AST SERPL-CCNC: 16 U/L (ref 15–37)
BASOPHILS ABSOLUTE: 0 K/UL (ref 0–0.2)
BASOPHILS RELATIVE PERCENT: 0.4 %
BILIRUB SERPL-MCNC: 0.4 MG/DL (ref 0–1)
BILIRUBIN DIRECT: <0.2 MG/DL (ref 0–0.3)
BILIRUBIN, INDIRECT: ABNORMAL MG/DL (ref 0–1)
CREAT SERPL-MCNC: 0.6 MG/DL (ref 0.6–1.2)
EOSINOPHILS ABSOLUTE: 0 K/UL (ref 0–0.6)
EOSINOPHILS RELATIVE PERCENT: 0 %
GFR AFRICAN AMERICAN: >60
GFR NON-AFRICAN AMERICAN: >60
HCT VFR BLD CALC: 35.4 % (ref 36–48)
HEMOGLOBIN: 11.9 G/DL (ref 12–16)
LYMPHOCYTES ABSOLUTE: 0.4 K/UL (ref 1–5.1)
LYMPHOCYTES RELATIVE PERCENT: 5.7 %
MCH RBC QN AUTO: 34.7 PG (ref 26–34)
MCHC RBC AUTO-ENTMCNC: 33.6 G/DL (ref 31–36)
MCV RBC AUTO: 103.2 FL (ref 80–100)
MONOCYTES ABSOLUTE: 0.2 K/UL (ref 0–1.3)
MONOCYTES RELATIVE PERCENT: 3 %
NEUTROPHILS ABSOLUTE: 7.2 K/UL (ref 1.7–7.7)
NEUTROPHILS RELATIVE PERCENT: 90.9 %
PDW BLD-RTO: 15.2 % (ref 12.4–15.4)
PLATELET # BLD: 299 K/UL (ref 135–450)
PMV BLD AUTO: 7.7 FL (ref 5–10.5)
RBC # BLD: 3.43 M/UL (ref 4–5.2)
TOTAL PROTEIN: 6.3 G/DL (ref 6.4–8.2)
WBC # BLD: 7.9 K/UL (ref 4–11)

## 2019-08-27 PROCEDURE — G8427 DOCREV CUR MEDS BY ELIG CLIN: HCPCS | Performed by: INTERNAL MEDICINE

## 2019-08-27 PROCEDURE — 99214 OFFICE O/P EST MOD 30 MIN: CPT | Performed by: INTERNAL MEDICINE

## 2019-08-27 PROCEDURE — G8417 CALC BMI ABV UP PARAM F/U: HCPCS | Performed by: INTERNAL MEDICINE

## 2019-08-27 PROCEDURE — 3017F COLORECTAL CA SCREEN DOC REV: CPT | Performed by: INTERNAL MEDICINE

## 2019-08-27 PROCEDURE — G8598 ASA/ANTIPLAT THER USED: HCPCS | Performed by: INTERNAL MEDICINE

## 2019-08-27 PROCEDURE — 1090F PRES/ABSN URINE INCON ASSESS: CPT | Performed by: INTERNAL MEDICINE

## 2019-08-27 PROCEDURE — G8399 PT W/DXA RESULTS DOCUMENT: HCPCS | Performed by: INTERNAL MEDICINE

## 2019-08-27 PROCEDURE — 4040F PNEUMOC VAC/ADMIN/RCVD: CPT | Performed by: INTERNAL MEDICINE

## 2019-08-27 PROCEDURE — 1123F ACP DISCUSS/DSCN MKR DOCD: CPT | Performed by: INTERNAL MEDICINE

## 2019-08-27 PROCEDURE — 1036F TOBACCO NON-USER: CPT | Performed by: INTERNAL MEDICINE

## 2019-08-27 PROCEDURE — 3014F SCREEN MAMMO DOC REV: CPT | Performed by: INTERNAL MEDICINE

## 2019-08-27 NOTE — PROGRESS NOTES
mg by mouth 3 times daily Yes Historical Provider, MD   furosemide (LASIX) 40 MG tablet Take 1 tablet by mouth daily  Patient taking differently: Take 40 mg by mouth daily as needed  Yes Titus Hickey MD   umeclidinium-vilanterol (ANORO ELLIPTA) 62.5-25 MCG/INH AEPB inhaler Inhale 1 puff into the lungs daily Yes Lisa Albrecht MD   aspirin 81 MG tablet Take 81 mg by mouth every other day  Yes Historical Provider, MD   HYDROcodone-acetaminophen (NORCO) 5-325 MG per tablet Take 1 tablet by mouth every 6 hours as needed for Pain. Yes Historical Provider, MD   venlafaxine (EFFEXOR) 75 MG tablet Take 75 mg by mouth daily. Yes Historical Provider, MD   diclofenac sodium 1 % GEL Apply 4 g topically 4 times daily. Yes Lilian Hyde MD   Insulin Glargine (LANTUS SC) Inject 25 Units into the skin 2 times daily. Yes Historical Provider, MD   Insulin Aspart (NOVOLOG FLEXPEN SC) Inject  into the skin. Yes Historical Provider, MD   levothyroxine (SYNTHROID) 100 MCG tablet Take 75 mcg by mouth daily  Yes Historical Provider, MD   estrogens, conjugated, (PREMARIN) 0.625 MG tablet Take 0.625 mg by mouth every 72 hours. Yes Historical Provider, MD   calcium carbonate (OSCAL) 500 MG TABS tablet Take 500 mg by mouth daily. Yes Historical Provider, MD   perphenazine 2 MG tablet Take 2 mg by mouth nightly. Yes Historical Provider, MD        OBJECTIVE:  /72   Pulse 88   Ht 5' 5\" (1.651 m)   Wt 193 lb (87.5 kg)   BMI 32.12 kg/m²      Physical Exam:    General: the patient demonstrated normal gait and posture without evidence of overt muscle wasting. Hygiene appears normal    Ears, mouth, nose, throat: no mucosal sores      Respiratory: assessment of the patient's respiratory effort showed no evidence of abnormal respiration and no use of accessory muscles. Diaphragmatic movement is normal.  Percussion of the patient's chest showed no evidence of dullness flatness or hyperresonance.   Auscultation of the patient's

## 2019-09-04 ENCOUNTER — TELEPHONE (OUTPATIENT)
Dept: INTERNAL MEDICINE CLINIC | Age: 74
End: 2019-09-04

## 2019-09-05 RX ORDER — MELOXICAM 7.5 MG/1
TABLET ORAL
Qty: 90 TABLET | Refills: 0 | Status: SHIPPED | OUTPATIENT
Start: 2019-09-05 | End: 2019-12-23

## 2019-09-18 ENCOUNTER — HOSPITAL ENCOUNTER (OUTPATIENT)
Dept: CT IMAGING | Age: 74
Discharge: HOME | End: 2019-09-18
Payer: MEDICARE

## 2019-09-18 DIAGNOSIS — R91.8 PULMONARY NODULES: ICD-10-CM

## 2019-09-18 PROCEDURE — 71250 CT THORAX DX C-: CPT

## 2019-10-09 ENCOUNTER — TELEPHONE (OUTPATIENT)
Dept: PULMONOLOGY | Age: 74
End: 2019-10-09

## 2019-10-10 RX ORDER — DOXYCYCLINE HYCLATE 100 MG/1
100 CAPSULE ORAL 2 TIMES DAILY
Qty: 20 CAPSULE | Refills: 0 | OUTPATIENT
Start: 2019-10-10 | End: 2019-10-20

## 2019-10-10 RX ORDER — PREDNISONE 10 MG/1
TABLET ORAL
Qty: 30 TABLET | Refills: 0 | Status: ON HOLD | OUTPATIENT
Start: 2019-10-10 | End: 2020-02-11

## 2019-10-23 ENCOUNTER — TELEPHONE (OUTPATIENT)
Dept: INTERNAL MEDICINE CLINIC | Age: 74
End: 2019-10-23

## 2019-10-25 RX ORDER — FOLIC ACID 1 MG/1
TABLET ORAL
Qty: 30 TABLET | Refills: 0 | Status: SHIPPED | OUTPATIENT
Start: 2019-10-25 | End: 2019-12-06 | Stop reason: SDUPTHER

## 2019-10-29 ENCOUNTER — OFFICE VISIT (OUTPATIENT)
Dept: RHEUMATOLOGY | Age: 74
End: 2019-10-29
Payer: MEDICARE

## 2019-10-29 VITALS
WEIGHT: 191.5 LBS | BODY MASS INDEX: 31.9 KG/M2 | HEIGHT: 65 IN | DIASTOLIC BLOOD PRESSURE: 80 MMHG | SYSTOLIC BLOOD PRESSURE: 128 MMHG | HEART RATE: 84 BPM

## 2019-10-29 DIAGNOSIS — L40.50 PSORIATIC ARTHRITIS (HCC): Primary | ICD-10-CM

## 2019-10-29 PROCEDURE — 1090F PRES/ABSN URINE INCON ASSESS: CPT | Performed by: INTERNAL MEDICINE

## 2019-10-29 PROCEDURE — 99213 OFFICE O/P EST LOW 20 MIN: CPT | Performed by: INTERNAL MEDICINE

## 2019-10-29 PROCEDURE — G9899 SCRN MAM PERF RSLTS DOC: HCPCS | Performed by: INTERNAL MEDICINE

## 2019-10-29 PROCEDURE — 1036F TOBACCO NON-USER: CPT | Performed by: INTERNAL MEDICINE

## 2019-10-29 PROCEDURE — G8417 CALC BMI ABV UP PARAM F/U: HCPCS | Performed by: INTERNAL MEDICINE

## 2019-10-29 PROCEDURE — 1123F ACP DISCUSS/DSCN MKR DOCD: CPT | Performed by: INTERNAL MEDICINE

## 2019-10-29 PROCEDURE — G8484 FLU IMMUNIZE NO ADMIN: HCPCS | Performed by: INTERNAL MEDICINE

## 2019-10-29 PROCEDURE — G8427 DOCREV CUR MEDS BY ELIG CLIN: HCPCS | Performed by: INTERNAL MEDICINE

## 2019-10-29 PROCEDURE — G8598 ASA/ANTIPLAT THER USED: HCPCS | Performed by: INTERNAL MEDICINE

## 2019-10-29 PROCEDURE — 3017F COLORECTAL CA SCREEN DOC REV: CPT | Performed by: INTERNAL MEDICINE

## 2019-10-29 PROCEDURE — G8399 PT W/DXA RESULTS DOCUMENT: HCPCS | Performed by: INTERNAL MEDICINE

## 2019-10-29 PROCEDURE — 4040F PNEUMOC VAC/ADMIN/RCVD: CPT | Performed by: INTERNAL MEDICINE

## 2019-10-30 ENCOUNTER — OFFICE VISIT (OUTPATIENT)
Dept: PULMONOLOGY | Age: 74
End: 2019-10-30
Payer: MEDICARE

## 2019-10-30 VITALS
OXYGEN SATURATION: 96 % | RESPIRATION RATE: 18 BRPM | HEIGHT: 65 IN | SYSTOLIC BLOOD PRESSURE: 122 MMHG | HEART RATE: 82 BPM | BODY MASS INDEX: 31.49 KG/M2 | DIASTOLIC BLOOD PRESSURE: 82 MMHG | WEIGHT: 189 LBS

## 2019-10-30 DIAGNOSIS — R91.8 PULMONARY NODULES: ICD-10-CM

## 2019-10-30 DIAGNOSIS — J44.9 COPD, SEVERE (HCC): Primary | ICD-10-CM

## 2019-10-30 DIAGNOSIS — L40.9 PSORIASIS: ICD-10-CM

## 2019-10-30 PROCEDURE — G8926 SPIRO NO PERF OR DOC: HCPCS | Performed by: NURSE PRACTITIONER

## 2019-10-30 PROCEDURE — 1036F TOBACCO NON-USER: CPT | Performed by: NURSE PRACTITIONER

## 2019-10-30 PROCEDURE — 3023F SPIROM DOC REV: CPT | Performed by: NURSE PRACTITIONER

## 2019-10-30 PROCEDURE — 3017F COLORECTAL CA SCREEN DOC REV: CPT | Performed by: NURSE PRACTITIONER

## 2019-10-30 PROCEDURE — G8484 FLU IMMUNIZE NO ADMIN: HCPCS | Performed by: NURSE PRACTITIONER

## 2019-10-30 PROCEDURE — G8428 CUR MEDS NOT DOCUMENT: HCPCS | Performed by: NURSE PRACTITIONER

## 2019-10-30 PROCEDURE — G8399 PT W/DXA RESULTS DOCUMENT: HCPCS | Performed by: NURSE PRACTITIONER

## 2019-10-30 PROCEDURE — 99214 OFFICE O/P EST MOD 30 MIN: CPT | Performed by: NURSE PRACTITIONER

## 2019-10-30 PROCEDURE — G8417 CALC BMI ABV UP PARAM F/U: HCPCS | Performed by: NURSE PRACTITIONER

## 2019-10-30 PROCEDURE — 4040F PNEUMOC VAC/ADMIN/RCVD: CPT | Performed by: NURSE PRACTITIONER

## 2019-10-30 PROCEDURE — 1123F ACP DISCUSS/DSCN MKR DOCD: CPT | Performed by: NURSE PRACTITIONER

## 2019-10-30 PROCEDURE — 1090F PRES/ABSN URINE INCON ASSESS: CPT | Performed by: NURSE PRACTITIONER

## 2019-10-30 PROCEDURE — G8598 ASA/ANTIPLAT THER USED: HCPCS | Performed by: NURSE PRACTITIONER

## 2019-10-30 PROCEDURE — G9899 SCRN MAM PERF RSLTS DOC: HCPCS | Performed by: NURSE PRACTITIONER

## 2019-10-30 ASSESSMENT — ENCOUNTER SYMPTOMS
COLOR CHANGE: 0
ABDOMINAL PAIN: 0
WHEEZING: 0
SHORTNESS OF BREATH: 0
COUGH: 0
CONSTIPATION: 0

## 2019-12-02 ENCOUNTER — HOSPITAL ENCOUNTER (OUTPATIENT)
Dept: WOMENS IMAGING | Age: 74
Discharge: HOME | End: 2019-12-02
Payer: MEDICARE

## 2019-12-02 DIAGNOSIS — R92.8 ABNORMAL MAMMOGRAM: ICD-10-CM

## 2019-12-02 PROCEDURE — G0279 TOMOSYNTHESIS, MAMMO: HCPCS

## 2019-12-06 RX ORDER — FOLIC ACID 1 MG/1
TABLET ORAL
Qty: 30 TABLET | Refills: 1 | Status: SHIPPED | OUTPATIENT
Start: 2019-12-06 | End: 2020-02-17

## 2019-12-23 RX ORDER — MELOXICAM 7.5 MG/1
TABLET ORAL
Qty: 90 TABLET | Refills: 0 | Status: ON HOLD | OUTPATIENT
Start: 2019-12-23 | End: 2020-02-11

## 2020-01-09 ENCOUNTER — TELEPHONE (OUTPATIENT)
Dept: CARDIOLOGY CLINIC | Age: 75
End: 2020-01-09

## 2020-01-09 NOTE — TELEPHONE ENCOUNTER
Patient has SOB with walking and gets pain across the lower part of her back near her hips. After resting for a few minutes she recuperates. Patient has chronic back pain and severe COPD also. Last OV with [facility] 3/2019       1. CAD (coronary artery disease):   stable  Cath 2001> Minimal LAD (30% first diagonal), medical management.    Plan: continue current medications    2. HTN (hypertension)   Today's B/P- 112/70   Stable, continue current  Medications    3. Cardiomyopathy:   stable  Continue Coreg, Losartan, and aldactone   ECHO 2009> Trace MR and CA  EF 50%  ECHO  8/14/15> EF 45-50%   4. SOB:  Stable, followed per pulmonary   Severe copd by pft's -- Follows with Dr. Alex Danielson. Uses inhalers which has helped her breathing.   ECHO 8/14/15> Mild global hk, ef 45-50%  Chest x-ray 1/15> Normal  CT chest 12/14> Opacities   5.  Rheumatoid arthritis: On methotrexate, stable   6.  Hyperlipemia: statin intolerant

## 2020-01-23 ENCOUNTER — OFFICE VISIT (OUTPATIENT)
Dept: PULMONOLOGY | Age: 75
End: 2020-01-23
Payer: MEDICARE

## 2020-01-23 VITALS — OXYGEN SATURATION: 95 % | DIASTOLIC BLOOD PRESSURE: 84 MMHG | SYSTOLIC BLOOD PRESSURE: 137 MMHG | HEART RATE: 84 BPM

## 2020-01-23 PROCEDURE — G9899 SCRN MAM PERF RSLTS DOC: HCPCS | Performed by: NURSE PRACTITIONER

## 2020-01-23 PROCEDURE — 3023F SPIROM DOC REV: CPT | Performed by: NURSE PRACTITIONER

## 2020-01-23 PROCEDURE — G8484 FLU IMMUNIZE NO ADMIN: HCPCS | Performed by: NURSE PRACTITIONER

## 2020-01-23 PROCEDURE — G8399 PT W/DXA RESULTS DOCUMENT: HCPCS | Performed by: NURSE PRACTITIONER

## 2020-01-23 PROCEDURE — 1090F PRES/ABSN URINE INCON ASSESS: CPT | Performed by: NURSE PRACTITIONER

## 2020-01-23 PROCEDURE — 99214 OFFICE O/P EST MOD 30 MIN: CPT | Performed by: NURSE PRACTITIONER

## 2020-01-23 PROCEDURE — G8417 CALC BMI ABV UP PARAM F/U: HCPCS | Performed by: NURSE PRACTITIONER

## 2020-01-23 PROCEDURE — 1123F ACP DISCUSS/DSCN MKR DOCD: CPT | Performed by: NURSE PRACTITIONER

## 2020-01-23 PROCEDURE — 1036F TOBACCO NON-USER: CPT | Performed by: NURSE PRACTITIONER

## 2020-01-23 PROCEDURE — 3017F COLORECTAL CA SCREEN DOC REV: CPT | Performed by: NURSE PRACTITIONER

## 2020-01-23 PROCEDURE — G8427 DOCREV CUR MEDS BY ELIG CLIN: HCPCS | Performed by: NURSE PRACTITIONER

## 2020-01-23 PROCEDURE — G8926 SPIRO NO PERF OR DOC: HCPCS | Performed by: NURSE PRACTITIONER

## 2020-01-23 PROCEDURE — 4040F PNEUMOC VAC/ADMIN/RCVD: CPT | Performed by: NURSE PRACTITIONER

## 2020-01-23 RX ORDER — OMEPRAZOLE 20 MG/1
20 CAPSULE, DELAYED RELEASE ORAL DAILY
Qty: 30 CAPSULE | Refills: 3 | Status: SHIPPED | OUTPATIENT
Start: 2020-01-23

## 2020-01-23 ASSESSMENT — ENCOUNTER SYMPTOMS
COLOR CHANGE: 0
WHEEZING: 0
CONSTIPATION: 0
ABDOMINAL PAIN: 0
COUGH: 0
SHORTNESS OF BREATH: 0

## 2020-01-23 NOTE — PROGRESS NOTES
[facility] Follow Up Note    Subjective:   CHIEF COMPLAINT / HPI: SOB, severe COPD   The patient is 76 y.o. female who presents today for a routine follow up visit related to the above mentioned issues. There is a PMH significant for CAD, cardiomyopathy, psoriatic arthritis and stopped MTX and Taltz d/t recurrent infection and HTN. She was last evaluated by me in October and at that time breathing had returned to normal.    Presently she reports breathing is doing OK but she does notice significant SOB with activity. This is about the same as it was during her last visit. She denies cough. She has been struggling with back pain and wondered if that contributes to FORDE. She has also noticed \"her air cutting short\" when she is active. Her pain doctor asked her to be evaluated by her cardiology to rule out a cardiac etiology. She also notes significant acid reflux which is new. She woke up a few days ago with a left ear ache and has started on Amoxicillin from her PCP and this is much better. She has been compliant with Anoro, without evidence of thrush. She has not required supplemental O2.         Past Medical History:   Diagnosis Date    CAD (coronary artery disease)     [facility] [address] [address]     basal cell skin    Cardiomyopathy ([facility] [address]     CHF (congestive heart failure) ([facility] [address]     Hyperlipidemia     Hypertension     Hypothyroidism     Psoriasis      Social History:    Social History     Tobacco Use   Smoking Status Former Smoker    Packs/day: 0.50    Years: 35.00    Pack years: 17.50    Types: Cigarettes    Last attempt to quit: 1991    Years since quittin.5   Smokeless Tobacco Never Used   Tobacco Comment    started to smoke at 21 / smoked up to 0.5 ppd for 35 yrs off and  on /      Current Medications:     Current Outpatient Medications on File Prior to Visit   Medication Sig Dispense Refill    meloxicam (MOBIC) 7.5 MG tablet TAKE ONE TABLET BY MOUTH DAILY 90 tablet 0    folic acid (FOLVITE) 1 MG tablet TAKE ONE TABLET BY MOUTH DAILY 30 tablet 1    predniSONE (DELTASONE) 10 MG tablet Take 40 mg daily x 3 days, 30 mg daily x 3 days, 20 mg daily x 3 days, 10 mg daily x 3 days then stop 30 tablet 0    losartan (COZAAR) 50 MG tablet TAKE 1 TABLET BY MOUTH TWO  TIMES DAILY 180 tablet 4    spironolactone (ALDACTONE) 25 MG tablet TAKE 1 TABLET BY MOUTH  DAILY 90 tablet 3    carvedilol (COREG) 25 MG tablet TAKE ONE-HALF TABLET BY  MOUTH TWICE A DAY 90 tablet 3    acetaminophen (TYLENOL) 500 MG tablet Take 500 mg by mouth every 6 hours as needed for Pain      gabapentin (NEURONTIN) 100 MG capsule Take 100 mg by mouth 3 times daily      furosemide (LASIX) 40 MG tablet Take 1 tablet by mouth daily (Patient taking differently: Take 40 mg by mouth daily as needed ) 90 tablet 3    umeclidinium-vilanterol (ANORO ELLIPTA) 62.5-25 MCG/INH AEPB inhaler Inhale 1 puff into the lungs daily 4 each 0    aspirin 81 MG tablet Take 81 mg by mouth every other day       HYDROcodone-acetaminophen (NORCO) 5-325 MG per tablet Take 1 tablet by mouth every 6 hours as needed for Pain.  venlafaxine (EFFEXOR) 75 MG tablet Take 75 mg by mouth daily.  diclofenac sodium 1 % GEL Apply 4 g topically 4 times daily. 3 Tube 3    Insulin Glargine (LANTUS SC) Inject 25 Units into the skin 2 times daily.  Insulin Aspart (NOVOLOG FLEXPEN SC) Inject  into the skin.  levothyroxine (SYNTHROID) 100 MCG tablet Take 75 mcg by mouth daily       estrogens, conjugated, (PREMARIN) 0.625 MG tablet Take 0.625 mg by mouth every 72 hours.  calcium carbonate (OSCAL) 500 MG TABS tablet Take 500 mg by mouth daily.  perphenazine 2 MG tablet Take 2 mg by mouth nightly. No current facility-administered medications on file prior to visit. Review of Systems   Constitutional: Negative for chills and fever. HENT: Positive for ear pain. Negative for congestion and postnasal drip. Respiratory: Negative for cough, shortness of breath and wheezing. Cardiovascular: Negative for chest pain and leg swelling. Gastrointestinal: Negative for abdominal pain and constipation. Musculoskeletal: Negative for arthralgias and joint swelling. Skin: Negative for color change and pallor. Allergic/Immunologic: Negative for environmental allergies and food allergies. Psychiatric/Behavioral: Negative for agitation and confusion. Objective:       VITALS:  /84   Pulse 84   SpO2 95% Comment: RA at rest     Physical Exam  Vitals signs reviewed. Constitutional:       General: She is not in acute distress. Appearance: She is well-developed. HENT:      Head: Normocephalic. Mouth/Throat:      Pharynx: No oropharyngeal exudate. Eyes:      General:         Right eye: No discharge. Left eye: No discharge. Conjunctiva/sclera: Conjunctivae normal.      Pupils: Pupils are equal, round, and reactive to light. Neck:      Musculoskeletal: Normal range of motion and neck supple. Trachea: No tracheal deviation. Cardiovascular:      Rate and Rhythm: Normal rate and regular rhythm. Heart sounds: No friction rub. Pulmonary:      Effort: Pulmonary effort is normal. No tachypnea, accessory muscle usage or respiratory distress. Breath sounds: No stridor. No wheezing or rales. Chest:      Chest wall: No tenderness or crepitus. Abdominal:      General: Bowel sounds are normal. There is no distension. Palpations: Abdomen is soft. Tenderness: There is no tenderness. Musculoskeletal: Normal range of motion. Lymphadenopathy:      Cervical: No cervical adenopathy. Skin:     General: Skin is warm and dry. Findings: No erythema. Neurological:      Mental Status: She is alert and oriented to person, place, and time. Psychiatric:         Thought Content:  Thought content normal.       DATA:      Radiology Review:  Pertinent images / reports were

## 2020-01-28 ENCOUNTER — OFFICE VISIT (OUTPATIENT)
Dept: RHEUMATOLOGY | Age: 75
End: 2020-01-28
Payer: MEDICARE

## 2020-01-28 VITALS
BODY MASS INDEX: 33.66 KG/M2 | DIASTOLIC BLOOD PRESSURE: 72 MMHG | HEIGHT: 65 IN | HEART RATE: 76 BPM | WEIGHT: 202 LBS | SYSTOLIC BLOOD PRESSURE: 118 MMHG

## 2020-01-28 LAB
ALBUMIN SERPL-MCNC: 3.5 G/DL (ref 3.4–5)
ALP BLD-CCNC: 133 U/L (ref 40–129)
ALT SERPL-CCNC: 16 U/L (ref 10–40)
AST SERPL-CCNC: 15 U/L (ref 15–37)
BASOPHILS ABSOLUTE: 0 K/UL (ref 0–0.2)
BASOPHILS RELATIVE PERCENT: 0.4 %
BILIRUB SERPL-MCNC: 0.5 MG/DL (ref 0–1)
BILIRUBIN DIRECT: <0.2 MG/DL (ref 0–0.3)
BILIRUBIN, INDIRECT: ABNORMAL MG/DL (ref 0–1)
CREAT SERPL-MCNC: 0.8 MG/DL (ref 0.6–1.2)
EOSINOPHILS ABSOLUTE: 0.2 K/UL (ref 0–0.6)
EOSINOPHILS RELATIVE PERCENT: 2.7 %
GFR AFRICAN AMERICAN: >60
GFR NON-AFRICAN AMERICAN: >60
HCT VFR BLD CALC: 36.7 % (ref 36–48)
HEMOGLOBIN: 12.3 G/DL (ref 12–16)
LYMPHOCYTES ABSOLUTE: 1.8 K/UL (ref 1–5.1)
LYMPHOCYTES RELATIVE PERCENT: 19.2 %
MCH RBC QN AUTO: 34.2 PG (ref 26–34)
MCHC RBC AUTO-ENTMCNC: 33.6 G/DL (ref 31–36)
MCV RBC AUTO: 101.7 FL (ref 80–100)
MONOCYTES ABSOLUTE: 0.7 K/UL (ref 0–1.3)
MONOCYTES RELATIVE PERCENT: 7.1 %
NEUTROPHILS ABSOLUTE: 6.5 K/UL (ref 1.7–7.7)
NEUTROPHILS RELATIVE PERCENT: 70.6 %
PDW BLD-RTO: 15 % (ref 12.4–15.4)
PLATELET # BLD: 276 K/UL (ref 135–450)
PMV BLD AUTO: 8.1 FL (ref 5–10.5)
RBC # BLD: 3.61 M/UL (ref 4–5.2)
TOTAL PROTEIN: 6.2 G/DL (ref 6.4–8.2)
WBC # BLD: 9.2 K/UL (ref 4–11)

## 2020-01-28 PROCEDURE — 1090F PRES/ABSN URINE INCON ASSESS: CPT | Performed by: INTERNAL MEDICINE

## 2020-01-28 PROCEDURE — 99213 OFFICE O/P EST LOW 20 MIN: CPT | Performed by: INTERNAL MEDICINE

## 2020-01-28 PROCEDURE — 1123F ACP DISCUSS/DSCN MKR DOCD: CPT | Performed by: INTERNAL MEDICINE

## 2020-01-28 PROCEDURE — G8399 PT W/DXA RESULTS DOCUMENT: HCPCS | Performed by: INTERNAL MEDICINE

## 2020-01-28 PROCEDURE — G8417 CALC BMI ABV UP PARAM F/U: HCPCS | Performed by: INTERNAL MEDICINE

## 2020-01-28 PROCEDURE — G9899 SCRN MAM PERF RSLTS DOC: HCPCS | Performed by: INTERNAL MEDICINE

## 2020-01-28 PROCEDURE — 4040F PNEUMOC VAC/ADMIN/RCVD: CPT | Performed by: INTERNAL MEDICINE

## 2020-01-28 PROCEDURE — G8484 FLU IMMUNIZE NO ADMIN: HCPCS | Performed by: INTERNAL MEDICINE

## 2020-01-28 PROCEDURE — 3017F COLORECTAL CA SCREEN DOC REV: CPT | Performed by: INTERNAL MEDICINE

## 2020-01-28 PROCEDURE — G8427 DOCREV CUR MEDS BY ELIG CLIN: HCPCS | Performed by: INTERNAL MEDICINE

## 2020-01-28 PROCEDURE — 1036F TOBACCO NON-USER: CPT | Performed by: INTERNAL MEDICINE

## 2020-01-30 ENCOUNTER — OFFICE VISIT (OUTPATIENT)
Dept: CARDIOLOGY CLINIC | Age: 75
End: 2020-01-30
Payer: MEDICARE

## 2020-01-30 ENCOUNTER — HOSPITAL ENCOUNTER (OUTPATIENT)
Dept: NON INVASIVE DIAGNOSTICS | Age: 75
Discharge: HOME | End: 2020-01-30
Payer: MEDICARE

## 2020-01-30 VITALS
OXYGEN SATURATION: 97 % | HEART RATE: 68 BPM | DIASTOLIC BLOOD PRESSURE: 62 MMHG | WEIGHT: 201.7 LBS | SYSTOLIC BLOOD PRESSURE: 110 MMHG | HEIGHT: 65 IN | BODY MASS INDEX: 33.61 KG/M2

## 2020-01-30 LAB
LV EF: 48 %
LVEF MODALITY: NORMAL

## 2020-01-30 PROCEDURE — G8417 CALC BMI ABV UP PARAM F/U: HCPCS | Performed by: INTERNAL MEDICINE

## 2020-01-30 PROCEDURE — 4040F PNEUMOC VAC/ADMIN/RCVD: CPT | Performed by: INTERNAL MEDICINE

## 2020-01-30 PROCEDURE — 1090F PRES/ABSN URINE INCON ASSESS: CPT | Performed by: INTERNAL MEDICINE

## 2020-01-30 PROCEDURE — G8427 DOCREV CUR MEDS BY ELIG CLIN: HCPCS | Performed by: INTERNAL MEDICINE

## 2020-01-30 PROCEDURE — 99214 OFFICE O/P EST MOD 30 MIN: CPT | Performed by: INTERNAL MEDICINE

## 2020-01-30 PROCEDURE — G9899 SCRN MAM PERF RSLTS DOC: HCPCS | Performed by: INTERNAL MEDICINE

## 2020-01-30 PROCEDURE — 93306 TTE W/DOPPLER COMPLETE: CPT

## 2020-01-30 PROCEDURE — 1123F ACP DISCUSS/DSCN MKR DOCD: CPT | Performed by: INTERNAL MEDICINE

## 2020-01-30 PROCEDURE — 3017F COLORECTAL CA SCREEN DOC REV: CPT | Performed by: INTERNAL MEDICINE

## 2020-01-30 PROCEDURE — G8399 PT W/DXA RESULTS DOCUMENT: HCPCS | Performed by: INTERNAL MEDICINE

## 2020-01-30 PROCEDURE — 1036F TOBACCO NON-USER: CPT | Performed by: INTERNAL MEDICINE

## 2020-01-30 PROCEDURE — G8484 FLU IMMUNIZE NO ADMIN: HCPCS | Performed by: INTERNAL MEDICINE

## 2020-01-30 NOTE — PROGRESS NOTES
[facility]       Referring Provider:  Grzegorz Gonzalez MD     Chief Complaint   Patient presents with    Shortness of Breath     Patient here today for follow up on Echo    Hypertension    Coronary Artery Disease    Hyperlipidemia      History of Present Illness:  Mrs. Melissa Malagon is here today for a yearly follow up of her CAD, HTN, HLD, CM. She is past smoker. She  sees Dr. Donis Goodell for COPD. Her major complaint is back issues. If she tries to do any activity \"my air cuts off and it feels like an elephant sitting on my back\". Pain is in lower back, lumbar area, not thoracic area. Injections done with improvement in pain. However, when she walks \"I have to get to a chair because my air cuts off\"      Past Medical History:   has a past medical history of CAD (coronary artery disease), Cancer ([facility] [address] Cardiomyopathy ([facility] [address] CHF (congestive heart failure) ([facility] [address] Hyperlipidemia, Hypertension, Hypothyroidism, and Psoriasis. Surgical History:   has a past surgical history that includes bladder repair; Hysterectomy; malignant skin lesion excision; and shoulder surgery (03/2013). Social History:   reports that she quit smoking about 28 years ago. Her smoking use included cigarettes. She has a 17.50 pack-year smoking history. She has never used smokeless tobacco. She reports current alcohol use. She reports that she does not use drugs. Family History:  family history includes Arthritis in her sister; Cancer in her mother; Heart Disease in her maternal grandmother and paternal grandmother. Home Medications:  Prior to Visit Medications    Medication Sig Taking?  Authorizing Provider   omeprazole (PRILOSEC) 20 MG delayed release capsule Take 1 capsule by mouth daily Yes BRIANNE Aragon CNP   meloxicam (MOBIC) 7.5 MG tablet TAKE ONE TABLET BY MOUTH DAILY Yes Mehdi Muñoz MD   folic acid (FOLVITE) 1 MG tablet TAKE ONE TABLET BY MOUTH DAILY Yes Mehdi Muñoz MD   predniSONE (DELTASONE) 10 MG tablet Take 40 mg daily x 3 days, 30 mg daily x 3 days, 20 mg daily x 3 days, 10 mg daily x 3 days then stop Yes BRIANNE Gregg - CNP   losartan (COZAAR) 50 MG tablet TAKE 1 TABLET BY MOUTH TWO  TIMES DAILY Yes Nikhil Gambino MD   spironolactone (ALDACTONE) 25 MG tablet TAKE 1 TABLET BY MOUTH  DAILY Yes Nikhil Gambino MD   carvedilol (COREG) 25 MG tablet TAKE ONE-HALF TABLET BY  MOUTH TWICE A DAY Yes Nikhil Gambino MD   acetaminophen (TYLENOL) 500 MG tablet Take 500 mg by mouth every 6 hours as needed for Pain Yes Historical Provider, MD   gabapentin (NEURONTIN) 100 MG capsule Take 100 mg by mouth 3 times daily Yes Historical Provider, MD   furosemide (LASIX) 40 MG tablet Take 1 tablet by mouth daily  Patient taking differently: Take 40 mg by mouth daily as needed  Yes Nikhil Gambino MD   umeclidinium-vilanterol (ANORO ELLIPTA) 62.5-25 MCG/INH AEPB inhaler Inhale 1 puff into the lungs daily Yes Jose C Paez MD   aspirin 81 MG tablet Take 81 mg by mouth every other day  Yes Historical Provider, MD   HYDROcodone-acetaminophen (NORCO) 5-325 MG per tablet Take 1 tablet by mouth every 6 hours as needed for Pain. Yes Historical Provider, MD   venlafaxine (EFFEXOR) 75 MG tablet Take 75 mg by mouth daily. Yes Historical Provider, MD   diclofenac sodium 1 % GEL Apply 4 g topically 4 times daily. Yes Oswaldo oCles MD   Insulin Glargine (LANTUS SC) Inject 25 Units into the skin 2 times daily. Yes Historical Provider, MD   Insulin Aspart (NOVOLOG FLEXPEN SC) Inject  into the skin. Yes Historical Provider, MD   levothyroxine (SYNTHROID) 100 MCG tablet Take 75 mcg by mouth daily  Yes Historical Provider, MD   estrogens, conjugated, (PREMARIN) 0.625 MG tablet Take 0.625 mg by mouth every 72 hours. Yes Historical Provider, MD   calcium carbonate (OSCAL) 500 MG TABS tablet Take 500 mg by mouth daily. Yes Historical Provider, MD   perphenazine 2 MG tablet Take 2 mg by mouth nightly. Yes Historical Provider, MD       Allergies:  Patient has no known allergies. [x] Medications and dosages reviewed. ROS:  [x]Full ROS obtained and negative except as mentioned in HPI      Physical Examination:    Vitals:    01/30/20 1430   BP: 110/62   Pulse: 68   SpO2: 97%        · GENERAL: Well developed, well nourished, No acute distress  · NEUROLOGICAL: Alert and oriented, no motor abnormalities  · PSYCH: Calm affect  · SKIN: Warm and dry--no rash  · HEENT: Normocephalic, Sclera non-icteric, mucus membranes moist  · NECK: supple, JVP normal  · CAROTID: Normal upstroke, no bruits  · CARDIAC: Normal PMI, regular rate and rhythm, normal S1S2, no murmur, rub, or gallop  · RESPIRATORY: Limited air movement, Minimal wheeze  · EXTREMITIES: No LE edema  · MUSCULOSKELETAL: No joint swelling or tenderness, no chest wall tenderness  · GASTROINTESTINAL: normal bowel sounds, soft, non-tender, no bruit    Assessment:     1. CAD (coronary artery disease):   Dyspnea with exertion \"air cuts off\"  ECHO today unchanged with mild LV dysfunction. Needs myoview  Continue medical management  Cath 2001> Minimal LAD (30% first diagonal), medical management. 2. HTN (hypertension)   Controlled. Continue current medications  /62 (Site: Left Upper Arm, Position: Sitting, Cuff Size: Large Adult)   Pulse 68   Ht 5' 5\" (1.651 m)   Wt 201 lb 11.2 oz (91.5 kg)   SpO2 97%   Breastfeeding No   BMI 33.56 kg/m²      3. Cardiomyopathy:   Stable. ECHO today 45-50%, unchanged  Continue coreg and losartan   ECHO 2009> Trace MR and WA  EF 50%  ECHO  8/14/15> EF 45-50%   4. SOB:  Stable, seeing Pulmonary. Needs myoview  Severe copd by pft's -- Follows with Dr. Gadiel Hernandez. Uses inhalers which has helped her breathing. ECHO 8/14/15> Mild global hk, ef 45-50%  Chest x-ray 1/15> Normal  CT chest 12/14> Opacities   5. Rheumatoid arthritis: On methotrexate, stable  6.   Hyperlipemia: Statin intolerant      Lexiscan myoview  If normal f/u 6 months  If ischemic will need cath (radial due to back issues)    Thank you for allowing me to participate in the care of this individual.      Shira Gresham M.D., [facility]

## 2020-02-04 ENCOUNTER — HOSPITAL ENCOUNTER (OUTPATIENT)
Dept: NON INVASIVE DIAGNOSTICS | Age: 75
Discharge: HOME | End: 2020-02-04
Payer: MEDICARE

## 2020-02-04 ENCOUNTER — TELEPHONE (OUTPATIENT)
Dept: CARDIOLOGY CLINIC | Age: 75
End: 2020-02-04

## 2020-02-04 LAB
LV EF: 61 %
LVEF MODALITY: NORMAL

## 2020-02-04 PROCEDURE — 93017 CV STRESS TEST TRACING ONLY: CPT | Performed by: INTERNAL MEDICINE

## 2020-02-04 PROCEDURE — 78452 HT MUSCLE IMAGE SPECT MULT: CPT | Performed by: INTERNAL MEDICINE

## 2020-02-04 PROCEDURE — 6360000002 HC RX W HCPCS: Performed by: INTERNAL MEDICINE

## 2020-02-04 PROCEDURE — A9502 TC99M TETROFOSMIN: HCPCS | Performed by: INTERNAL MEDICINE

## 2020-02-04 PROCEDURE — 3430000000 HC RX DIAGNOSTIC RADIOPHARMACEUTICAL: Performed by: INTERNAL MEDICINE

## 2020-02-04 RX ADMIN — TETROFOSMIN 30 MILLICURIE: 1.38 INJECTION, POWDER, LYOPHILIZED, FOR SOLUTION INTRAVENOUS at 14:10

## 2020-02-04 RX ADMIN — TETROFOSMIN 10 MILLICURIE: 1.38 INJECTION, POWDER, LYOPHILIZED, FOR SOLUTION INTRAVENOUS at 13:00

## 2020-02-04 RX ADMIN — REGADENOSON 0.4 MG: 0.08 INJECTION, SOLUTION INTRAVENOUS at 14:10

## 2020-02-04 NOTE — PROGRESS NOTES
Instructed on Lexiscan Stress Test Procedure including possible side effects/ adverse reactions. Patient verbalizes  understanding and denies having any questions . See [address] [place] [address] Cardiology.

## 2020-02-11 ENCOUNTER — HOSPITAL ENCOUNTER (OUTPATIENT)
Dept: CARDIAC CATH/INVASIVE PROCEDURES | Age: 75
Discharge: HOME | End: 2020-02-12
Attending: INTERNAL MEDICINE | Admitting: INTERNAL MEDICINE
Payer: MEDICARE

## 2020-02-11 PROBLEM — R94.39 ABNORMAL STRESS TEST: Status: ACTIVE | Noted: 2020-02-11

## 2020-02-11 PROBLEM — I20.0 UNSTABLE ANGINA (HCC): Status: ACTIVE | Noted: 2020-02-11

## 2020-02-11 LAB
ANION GAP SERPL CALCULATED.3IONS-SCNC: 12 MMOL/L (ref 3–16)
BUN BLDV-MCNC: 13 MG/DL (ref 7–20)
CALCIUM SERPL-MCNC: 9.3 MG/DL (ref 8.3–10.6)
CHLORIDE BLD-SCNC: 97 MMOL/L (ref 99–110)
CO2: 24 MMOL/L (ref 21–32)
CREAT SERPL-MCNC: 0.7 MG/DL (ref 0.6–1.2)
EKG ATRIAL RATE: 94 BPM
EKG DIAGNOSIS: NORMAL
EKG P AXIS: 73 DEGREES
EKG P-R INTERVAL: 160 MS
EKG Q-T INTERVAL: 346 MS
EKG QRS DURATION: 82 MS
EKG QTC CALCULATION (BAZETT): 432 MS
EKG R AXIS: -16 DEGREES
EKG T AXIS: 50 DEGREES
EKG VENTRICULAR RATE: 94 BPM
GFR AFRICAN AMERICAN: >60
GFR NON-AFRICAN AMERICAN: >60
GLUCOSE BLD-MCNC: 183 MG/DL (ref 70–99)
GLUCOSE BLD-MCNC: 223 MG/DL (ref 70–99)
GLUCOSE BLD-MCNC: 257 MG/DL (ref 70–99)
GLUCOSE BLD-MCNC: 269 MG/DL (ref 70–99)
HCT VFR BLD CALC: 35.7 % (ref 36–48)
HCT VFR BLD CALC: 36.2 % (ref 36–48)
HEMOGLOBIN: 11.7 G/DL (ref 12–16)
HEMOGLOBIN: 12.3 G/DL (ref 12–16)
LEFT VENTRICULAR EJECTION FRACTION HIGH VALUE: 50 %
LEFT VENTRICULAR EJECTION FRACTION MODE: NORMAL
MCH RBC QN AUTO: 33.4 PG (ref 26–34)
MCH RBC QN AUTO: 33.9 PG (ref 26–34)
MCHC RBC AUTO-ENTMCNC: 32.9 G/DL (ref 31–36)
MCHC RBC AUTO-ENTMCNC: 33.9 G/DL (ref 31–36)
MCV RBC AUTO: 100.1 FL (ref 80–100)
MCV RBC AUTO: 101.4 FL (ref 80–100)
PDW BLD-RTO: 15 % (ref 12.4–15.4)
PDW BLD-RTO: 15 % (ref 12.4–15.4)
PERFORMED ON: ABNORMAL
PLATELET # BLD: 307 K/UL (ref 135–450)
PLATELET # BLD: 315 K/UL (ref 135–450)
PMV BLD AUTO: 7.6 FL (ref 5–10.5)
PMV BLD AUTO: 7.9 FL (ref 5–10.5)
POC ACT LR: 205 SEC
POC ACT LR: 346 SEC
POTASSIUM SERPL-SCNC: 4.5 MMOL/L (ref 3.5–5.1)
RBC # BLD: 3.52 M/UL (ref 4–5.2)
RBC # BLD: 3.62 M/UL (ref 4–5.2)
SODIUM BLD-SCNC: 133 MMOL/L (ref 136–145)
WBC # BLD: 8.3 K/UL (ref 4–11)
WBC # BLD: 8.6 K/UL (ref 4–11)

## 2020-02-11 PROCEDURE — C1769 GUIDE WIRE: HCPCS

## 2020-02-11 PROCEDURE — 92928 PRQ TCAT PLMT NTRAC ST 1 LES: CPT | Performed by: INTERNAL MEDICINE

## 2020-02-11 PROCEDURE — C1874 STENT, COATED/COV W/DEL SYS: HCPCS

## 2020-02-11 PROCEDURE — 85027 COMPLETE CBC AUTOMATED: CPT

## 2020-02-11 PROCEDURE — 93010 ELECTROCARDIOGRAM REPORT: CPT | Performed by: INTERNAL MEDICINE

## 2020-02-11 PROCEDURE — 85347 COAGULATION TIME ACTIVATED: CPT

## 2020-02-11 PROCEDURE — 99153 MOD SED SAME PHYS/QHP EA: CPT

## 2020-02-11 PROCEDURE — G0378 HOSPITAL OBSERVATION PER HR: HCPCS

## 2020-02-11 PROCEDURE — 94640 AIRWAY INHALATION TREATMENT: CPT

## 2020-02-11 PROCEDURE — 6360000004 HC RX CONTRAST MEDICATION: Performed by: INTERNAL MEDICINE

## 2020-02-11 PROCEDURE — 2500000003 HC RX 250 WO HCPCS

## 2020-02-11 PROCEDURE — 93005 ELECTROCARDIOGRAM TRACING: CPT | Performed by: INTERNAL MEDICINE

## 2020-02-11 PROCEDURE — 36415 COLL VENOUS BLD VENIPUNCTURE: CPT

## 2020-02-11 PROCEDURE — 6360000002 HC RX W HCPCS

## 2020-02-11 PROCEDURE — C1751 CATH, INF, PER/CENT/MIDLINE: HCPCS

## 2020-02-11 PROCEDURE — 2709999900 HC NON-CHARGEABLE SUPPLY

## 2020-02-11 PROCEDURE — 80048 BASIC METABOLIC PNL TOTAL CA: CPT

## 2020-02-11 PROCEDURE — 94761 N-INVAS EAR/PLS OXIMETRY MLT: CPT

## 2020-02-11 PROCEDURE — 93460 R&L HRT ART/VENTRICLE ANGIO: CPT

## 2020-02-11 PROCEDURE — C9600 PERC DRUG-EL COR STENT SING: HCPCS

## 2020-02-11 PROCEDURE — 6370000000 HC RX 637 (ALT 250 FOR IP): Performed by: INTERNAL MEDICINE

## 2020-02-11 PROCEDURE — C1725 CATH, TRANSLUMIN NON-LASER: HCPCS

## 2020-02-11 PROCEDURE — C1887 CATHETER, GUIDING: HCPCS

## 2020-02-11 PROCEDURE — 2580000003 HC RX 258: Performed by: INTERNAL MEDICINE

## 2020-02-11 PROCEDURE — C1760 CLOSURE DEV, VASC: HCPCS

## 2020-02-11 PROCEDURE — 93460 R&L HRT ART/VENTRICLE ANGIO: CPT | Performed by: INTERNAL MEDICINE

## 2020-02-11 PROCEDURE — 6370000000 HC RX 637 (ALT 250 FOR IP)

## 2020-02-11 PROCEDURE — C1894 INTRO/SHEATH, NON-LASER: HCPCS

## 2020-02-11 PROCEDURE — 99152 MOD SED SAME PHYS/QHP 5/>YRS: CPT

## 2020-02-11 RX ORDER — SODIUM CHLORIDE 0.9 % (FLUSH) 0.9 %
10 SYRINGE (ML) INJECTION PRN
Status: DISCONTINUED | OUTPATIENT
Start: 2020-02-11 | End: 2020-02-12 | Stop reason: HOSPADM

## 2020-02-11 RX ORDER — CALCIUM CARBONATE 500(1250)
500 TABLET ORAL DAILY
Status: DISCONTINUED | OUTPATIENT
Start: 2020-02-12 | End: 2020-02-12 | Stop reason: HOSPADM

## 2020-02-11 RX ORDER — VENLAFAXINE HYDROCHLORIDE 75 MG/1
75 CAPSULE, EXTENDED RELEASE ORAL DAILY
COMMUNITY

## 2020-02-11 RX ORDER — ACETAMINOPHEN 500 MG
500 TABLET ORAL EVERY 6 HOURS PRN
Status: DISCONTINUED | OUTPATIENT
Start: 2020-02-11 | End: 2020-02-11 | Stop reason: SDUPTHER

## 2020-02-11 RX ORDER — FOLIC ACID 1 MG/1
1000 TABLET ORAL DAILY
Status: DISCONTINUED | OUTPATIENT
Start: 2020-02-12 | End: 2020-02-12 | Stop reason: HOSPADM

## 2020-02-11 RX ORDER — FUROSEMIDE 40 MG/1
40 TABLET ORAL DAILY
Status: DISCONTINUED | OUTPATIENT
Start: 2020-02-11 | End: 2020-02-12 | Stop reason: HOSPADM

## 2020-02-11 RX ORDER — HYDROCODONE BITARTRATE AND ACETAMINOPHEN 5; 325 MG/1; MG/1
1 TABLET ORAL EVERY 6 HOURS PRN
Status: DISCONTINUED | OUTPATIENT
Start: 2020-02-11 | End: 2020-02-12 | Stop reason: HOSPADM

## 2020-02-11 RX ORDER — NICOTINE POLACRILEX 4 MG
15 LOZENGE BUCCAL PRN
Status: DISCONTINUED | OUTPATIENT
Start: 2020-02-11 | End: 2020-02-12 | Stop reason: HOSPADM

## 2020-02-11 RX ORDER — LOSARTAN POTASSIUM 25 MG/1
50 TABLET ORAL 2 TIMES DAILY
Status: DISCONTINUED | OUTPATIENT
Start: 2020-02-11 | End: 2020-02-12 | Stop reason: HOSPADM

## 2020-02-11 RX ORDER — ATORVASTATIN CALCIUM 40 MG/1
40 TABLET, FILM COATED ORAL NIGHTLY
Status: DISCONTINUED | OUTPATIENT
Start: 2020-02-11 | End: 2020-02-12 | Stop reason: HOSPADM

## 2020-02-11 RX ORDER — INSULIN LISPRO 100 [IU]/ML
0-6 INJECTION, SOLUTION INTRAVENOUS; SUBCUTANEOUS
Status: DISCONTINUED | OUTPATIENT
Start: 2020-02-11 | End: 2020-02-12 | Stop reason: HOSPADM

## 2020-02-11 RX ORDER — PREGABALIN 75 MG/1
75 CAPSULE ORAL 2 TIMES DAILY
Status: DISCONTINUED | OUTPATIENT
Start: 2020-02-11 | End: 2020-02-12 | Stop reason: HOSPADM

## 2020-02-11 RX ORDER — PANTOPRAZOLE SODIUM 40 MG/1
40 TABLET, DELAYED RELEASE ORAL
Status: DISCONTINUED | OUTPATIENT
Start: 2020-02-12 | End: 2020-02-12 | Stop reason: HOSPADM

## 2020-02-11 RX ORDER — SODIUM CHLORIDE 9 MG/ML
INJECTION, SOLUTION INTRAVENOUS CONTINUOUS
Status: ACTIVE | OUTPATIENT
Start: 2020-02-11 | End: 2020-02-11

## 2020-02-11 RX ORDER — GABAPENTIN 100 MG/1
100 CAPSULE ORAL 3 TIMES DAILY
Status: DISCONTINUED | OUTPATIENT
Start: 2020-02-11 | End: 2020-02-11

## 2020-02-11 RX ORDER — SODIUM CHLORIDE 0.9 % (FLUSH) 0.9 %
10 SYRINGE (ML) INJECTION EVERY 12 HOURS SCHEDULED
Status: DISCONTINUED | OUTPATIENT
Start: 2020-02-11 | End: 2020-02-12 | Stop reason: HOSPADM

## 2020-02-11 RX ORDER — DEXTROSE MONOHYDRATE 50 MG/ML
100 INJECTION, SOLUTION INTRAVENOUS PRN
Status: DISCONTINUED | OUTPATIENT
Start: 2020-02-11 | End: 2020-02-12 | Stop reason: HOSPADM

## 2020-02-11 RX ORDER — INSULIN LISPRO 100 [IU]/ML
0-3 INJECTION, SOLUTION INTRAVENOUS; SUBCUTANEOUS NIGHTLY
Status: DISCONTINUED | OUTPATIENT
Start: 2020-02-11 | End: 2020-02-12 | Stop reason: HOSPADM

## 2020-02-11 RX ORDER — ESTRADIOL 1 MG/1
0.5 TABLET ORAL
Status: DISCONTINUED | OUTPATIENT
Start: 2020-02-14 | End: 2020-02-12 | Stop reason: HOSPADM

## 2020-02-11 RX ORDER — ASPIRIN 81 MG/1
81 TABLET ORAL EVERY OTHER DAY
Status: DISCONTINUED | OUTPATIENT
Start: 2020-02-12 | End: 2020-02-12 | Stop reason: HOSPADM

## 2020-02-11 RX ORDER — ACETAMINOPHEN 325 MG/1
650 TABLET ORAL EVERY 4 HOURS PRN
Status: DISCONTINUED | OUTPATIENT
Start: 2020-02-11 | End: 2020-02-12 | Stop reason: HOSPADM

## 2020-02-11 RX ORDER — CLOPIDOGREL BISULFATE 75 MG/1
75 TABLET ORAL DAILY
Status: DISCONTINUED | OUTPATIENT
Start: 2020-02-12 | End: 2020-02-12 | Stop reason: HOSPADM

## 2020-02-11 RX ORDER — ONDANSETRON 2 MG/ML
4 INJECTION INTRAMUSCULAR; INTRAVENOUS EVERY 6 HOURS PRN
Status: DISCONTINUED | OUTPATIENT
Start: 2020-02-11 | End: 2020-02-12 | Stop reason: HOSPADM

## 2020-02-11 RX ORDER — LEVOTHYROXINE SODIUM 0.07 MG/1
75 TABLET ORAL DAILY
Status: DISCONTINUED | OUTPATIENT
Start: 2020-02-11 | End: 2020-02-12 | Stop reason: HOSPADM

## 2020-02-11 RX ORDER — CARVEDILOL 25 MG/1
25 TABLET ORAL 2 TIMES DAILY
Status: DISCONTINUED | OUTPATIENT
Start: 2020-02-11 | End: 2020-02-11

## 2020-02-11 RX ORDER — VENLAFAXINE HYDROCHLORIDE 37.5 MG/1
75 CAPSULE, EXTENDED RELEASE ORAL DAILY
Status: DISCONTINUED | OUTPATIENT
Start: 2020-02-12 | End: 2020-02-12 | Stop reason: HOSPADM

## 2020-02-11 RX ORDER — SPIRONOLACTONE 25 MG/1
25 TABLET ORAL DAILY
Status: DISCONTINUED | OUTPATIENT
Start: 2020-02-11 | End: 2020-02-12 | Stop reason: HOSPADM

## 2020-02-11 RX ORDER — PERPHENAZINE 2 MG/1
2 TABLET, FILM COATED ORAL NIGHTLY
Status: DISCONTINUED | OUTPATIENT
Start: 2020-02-11 | End: 2020-02-12 | Stop reason: HOSPADM

## 2020-02-11 RX ORDER — CARVEDILOL 6.25 MG/1
12.5 TABLET ORAL 2 TIMES DAILY WITH MEALS
Status: DISCONTINUED | OUTPATIENT
Start: 2020-02-11 | End: 2020-02-12 | Stop reason: HOSPADM

## 2020-02-11 RX ORDER — PREGABALIN 75 MG/1
75 CAPSULE ORAL 2 TIMES DAILY
COMMUNITY
End: 2021-11-03 | Stop reason: ALTCHOICE

## 2020-02-11 RX ORDER — DEXTROSE MONOHYDRATE 25 G/50ML
12.5 INJECTION, SOLUTION INTRAVENOUS PRN
Status: DISCONTINUED | OUTPATIENT
Start: 2020-02-11 | End: 2020-02-12 | Stop reason: HOSPADM

## 2020-02-11 RX ORDER — INSULIN GLARGINE 100 [IU]/ML
25 INJECTION, SOLUTION SUBCUTANEOUS 2 TIMES DAILY
Status: DISCONTINUED | OUTPATIENT
Start: 2020-02-11 | End: 2020-02-12 | Stop reason: HOSPADM

## 2020-02-11 RX ADMIN — FUROSEMIDE 40 MG: 40 TABLET ORAL at 17:28

## 2020-02-11 RX ADMIN — INSULIN GLARGINE 25 UNITS: 100 INJECTION, SOLUTION SUBCUTANEOUS at 22:31

## 2020-02-11 RX ADMIN — PREGABALIN 75 MG: 75 CAPSULE ORAL at 20:48

## 2020-02-11 RX ADMIN — INSULIN LISPRO 2 UNITS: 100 INJECTION, SOLUTION INTRAVENOUS; SUBCUTANEOUS at 17:46

## 2020-02-11 RX ADMIN — DESMOPRESSIN ACETATE 40 MG: 0.2 TABLET ORAL at 20:48

## 2020-02-11 RX ADMIN — PERPHENAZINE 2 MG: 2 TABLET, FILM COATED ORAL at 20:53

## 2020-02-11 RX ADMIN — CARVEDILOL 12.5 MG: 6.25 TABLET, FILM COATED ORAL at 17:28

## 2020-02-11 RX ADMIN — GLYCOPYRROLATE AND FORMOTEROL FUMARATE 2 PUFF: 9; 4.8 AEROSOL, METERED RESPIRATORY (INHALATION) at 19:32

## 2020-02-11 RX ADMIN — IOPAMIDOL 106 ML: 755 INJECTION, SOLUTION INTRAVENOUS at 11:35

## 2020-02-11 RX ADMIN — LOSARTAN POTASSIUM 50 MG: 25 TABLET ORAL at 20:48

## 2020-02-11 RX ADMIN — INSULIN LISPRO 2 UNITS: 100 INJECTION, SOLUTION INTRAVENOUS; SUBCUTANEOUS at 22:32

## 2020-02-11 RX ADMIN — SODIUM CHLORIDE: 9 INJECTION, SOLUTION INTRAVENOUS at 12:30

## 2020-02-11 ASSESSMENT — PAIN SCALES - GENERAL
PAINLEVEL_OUTOF10: 4
PAINLEVEL_OUTOF10: 0
PAINLEVEL_OUTOF10: 4
PAINLEVEL_OUTOF10: 0

## 2020-02-11 ASSESSMENT — PAIN DESCRIPTION - PAIN TYPE
TYPE: CHRONIC PAIN
TYPE: CHRONIC PAIN

## 2020-02-11 ASSESSMENT — PAIN DESCRIPTION - LOCATION
LOCATION: BACK
LOCATION: BACK

## 2020-02-11 ASSESSMENT — PAIN DESCRIPTION - ORIENTATION
ORIENTATION: LOWER
ORIENTATION: LOWER

## 2020-02-11 NOTE — H&P
[facility]       Referring Provider:  Homero Boateng MD     Chief complaint: Dyspnea on exertion     History of Present Illness:  Mrs. Jennifer Casey is here today for a yearly follow up of her CAD, HTN, HLD, CM. She is past smoker. She  sees Dr. Job Suazo for COPD. Her major complaint is back issues. If she tries to do any activity \"my air cuts off and it feels like an elephant sitting on my back\". Pain is in lower back, lumbar area, not thoracic area. Injections done with improvement in pain. However, when she walks \"I have to get to a chair because my air cuts off\"      Past Medical History:   has a past medical history of CAD (coronary artery disease), Cancer ([facility] Cardiomyopathy ([facility] [address] CHF (congestive heart failure) ([facility] [address] Hyperlipidemia, Hypertension, Hypothyroidism, and Psoriasis. Surgical History:   has a past surgical history that includes bladder repair; Hysterectomy; malignant skin lesion excision; and shoulder surgery (03/2013). Social History:   reports that she quit smoking about 28 years ago. Her smoking use included cigarettes. She has a 17.50 pack-year smoking history. She has never used smokeless tobacco. She reports current alcohol use. She reports that she does not use drugs. Family History:  family history includes Arthritis in her sister; Cancer in her mother; Heart Disease in her maternal grandmother and paternal grandmother. Home Medications:  Prior to Visit Medications    Medication Sig Taking?  Authorizing Provider   omeprazole (PRILOSEC) 20 MG delayed release capsule Take 1 capsule by mouth daily Yes BRIANNE Ronquillo - CNP   folic acid (FOLVITE) 1 MG tablet TAKE ONE TABLET BY MOUTH DAILY Yes Dane Quan MD   losartan (COZAAR) 50 MG tablet TAKE 1 TABLET BY MOUTH TWO  TIMES DAILY Yes Stefania Boyle MD   spironolactone (ALDACTONE) 25 MG tablet TAKE 1 TABLET BY MOUTH  DAILY Yes Stefania Boyle MD   carvedilol (COREG) 25 MG tablet TAKE ONE-HALF Melissa Upton MD   acetaminophen (TYLENOL) 500 MG tablet Take 500 mg by mouth every 6 hours as needed for Pain Yes Historical Provider, MD   gabapentin (NEURONTIN) 100 MG capsule Take 100 mg by mouth 3 times daily Yes Historical Provider, MD   furosemide (LASIX) 40 MG tablet Take 1 tablet by mouth daily  Patient taking differently: Take 40 mg by mouth daily as needed  Yes Sandrita Adame MD   umeclidinium-vilanterol (ANORO ELLIPTA) 62.5-25 MCG/INH AEPB inhaler Inhale 1 puff into the lungs daily Yes Marion Acosta MD   aspirin 81 MG tablet Take 81 mg by mouth every other day  Yes Historical Provider, MD   HYDROcodone-acetaminophen (NORCO) 5-325 MG per tablet Take 1 tablet by mouth every 6 hours as needed for Pain. Yes Historical Provider, MD   venlafaxine (EFFEXOR) 75 MG tablet Take 75 mg by mouth daily. Yes Historical Provider, MD   diclofenac sodium 1 % GEL Apply 4 g topically 4 times daily. Yes Korina Scott MD   Insulin Glargine (LANTUS SC) Inject 25 Units into the skin 2 times daily. Yes Historical Provider, MD   Insulin Aspart (NOVOLOG FLEXPEN SC) Inject  into the skin. Yes Historical Provider, MD   levothyroxine (SYNTHROID) 100 MCG tablet Take 75 mcg by mouth daily  Yes Historical Provider, MD   estrogens, conjugated, (PREMARIN) 0.625 MG tablet Take 0.625 mg by mouth every 72 hours. Yes Historical Provider, MD   calcium carbonate (OSCAL) 500 MG TABS tablet Take 500 mg by mouth daily. Yes Historical Provider, MD   perphenazine 2 MG tablet Take 2 mg by mouth nightly. Yes Historical Provider, MD       Allergies:  Patient has no known allergies. [x] Medications and dosages reviewed.   ROS:  [x]Full ROS obtained and negative except as mentioned in HPI      Physical Examination:    Vitals:    02/11/20 0815   BP: (!) 140/72   Pulse: 94   Resp: 18   Temp: 98.1 °F (36.7 °C)   SpO2: 95%        · GENERAL: Well developed, well nourished, No acute distress  · NEUROLOGICAL: Alert and oriented, no motor abnormalities  · PSYCH: Calm affect  · SKIN: Warm and dry--no rash  · HEENT: Normocephalic, Sclera non-icteric, mucus membranes moist  · NECK: supple, JVP normal  · CAROTID: Normal upstroke, no bruits  · CARDIAC: Normal PMI, regular rate and rhythm, normal S1S2, no murmur, rub, or gallop  · RESPIRATORY: Limited air movement, Minimal wheeze  · EXTREMITIES: No LE edema  · MUSCULOSKELETAL: No joint swelling or tenderness, no chest wall tenderness  · GASTROINTESTINAL: normal bowel sounds, soft, non-tender, no bruit    Assessment:     1. CAD (coronary artery disease):   Dyspnea with exertion \"air cuts off\"  ECHO today unchanged with mild LV dysfunction. Needs myoview  Continue medical management  Cath 2001> Minimal LAD (30% first diagonal), medical management. 2. HTN (hypertension)   Controlled. Continue current medications  BP (!) 140/72   Pulse 94   Temp 98.1 °F (36.7 °C)   Resp 18   Ht 5' 5\" (1.651 m)   Wt 200 lb (90.7 kg)   SpO2 95%   BMI 33.28 kg/m²      3. Cardiomyopathy:   Stable. ECHO today 45-50%, unchanged  Continue coreg and losartan   ECHO 2009> Trace MR and FL  EF 50%  ECHO  8/14/15> EF 45-50%   4. SOB:  Stable, seeing Pulmonary. Needs myoview  Severe copd by pft's -- Follows with Dr. Eliza Samuel. Uses inhalers which has helped her breathing. ECHO 8/14/15> Mild global hk, ef 45-50%  Chest x-ray 1/15> Normal  CT chest 12/14> Opacities   5. Rheumatoid arthritis: On methotrexate, stable  6. Hyperlipemia: Statin intolerant      Lexiscan myoview  If normal f/u 6 months  If ischemic will need cath (radial due to back issues)    Thank you for allowing me to participate in the care of this individual.      Nico Ding M.D., [facility] [place]    Patient seen and examined. Chart reviewed. Changes to H&P include abnormal stress test.  Cardiac catheterization is recommended.     Myoview stress test 2/4/2020:  Summary    Small sized inferolateral completely reversible defect of moderate

## 2020-02-11 NOTE — CARE COORDINATION
Patient admitted as Observation/OPIB with an anticipated short hospitalization length of stay. Chart reviewed and it appears that patient has minimal needs for discharge at this time. Discussed with patients nurse and requested that case management be notified if discharge needs are identified. Case management will continue to follow progress and update discharge plan as needed.     ANALIA WashingtonN, .[phone]

## 2020-02-11 NOTE — BRIEF OP NOTE
Patient:  Audelia Moreno   :   1945    A pre-sedation re-evaluation was performed immediately prior to beginning of  the procedure. Procedure: Right and left heart cath, PCI  Medications: Procedural sedation with minimal conscious sedation  Complications: None  Estimated Blood Loss: Less than 25 mL  Specimens: Were not obtained    Cardiac Cath 2020: Anatomy:   LM-normal  LAD-20% proximal, 95% mid, calcified  Cx-20% proximal  OM1-patent  RCA-20% mid, calcified  RPDA-patent, dominant  LVEF-50% with inferior hypokinesis, LVEDP 20 mmHg  PCI: LAD 90% to 0% mid with a 2.5 mm x 15 mm Medtronic resolute [place] KYLAH. Hemodynamics:  RA- mean 14  RV-37/6, 3  PAWP-18/15 mean 15  PA-40/6 mean 27    C. O. Darrin 5.83/thermal 3.64  C. I. Darrin 2.95/thermal 1.87    Impression:  1. Severe one-vessel CAD involving the mid LAD with successful revascularization with KYLAH x1.  2.  Preserved LV systolic function with LVEF of 50%. 3.  Pulmonary pressures upper normal to very mildly increased. Plan:  1. DAPT. 2.  Add statin. 3.  Consider additional diuresis.

## 2020-02-12 VITALS
WEIGHT: 195.77 LBS | SYSTOLIC BLOOD PRESSURE: 132 MMHG | TEMPERATURE: 97.8 F | DIASTOLIC BLOOD PRESSURE: 92 MMHG | HEART RATE: 82 BPM | RESPIRATION RATE: 20 BRPM | HEIGHT: 65 IN | BODY MASS INDEX: 32.62 KG/M2 | OXYGEN SATURATION: 96 %

## 2020-02-12 LAB
ANION GAP SERPL CALCULATED.3IONS-SCNC: 12 MMOL/L (ref 3–16)
BUN BLDV-MCNC: 10 MG/DL (ref 7–20)
CALCIUM SERPL-MCNC: 9.2 MG/DL (ref 8.3–10.6)
CHLORIDE BLD-SCNC: 97 MMOL/L (ref 99–110)
CHOLESTEROL, TOTAL: 154 MG/DL (ref 0–199)
CO2: 26 MMOL/L (ref 21–32)
CREAT SERPL-MCNC: 0.7 MG/DL (ref 0.6–1.2)
EKG ATRIAL RATE: 89 BPM
EKG DIAGNOSIS: NORMAL
EKG P AXIS: 45 DEGREES
EKG P-R INTERVAL: 160 MS
EKG Q-T INTERVAL: 378 MS
EKG QRS DURATION: 86 MS
EKG QTC CALCULATION (BAZETT): 459 MS
EKG R AXIS: -25 DEGREES
EKG T AXIS: 39 DEGREES
EKG VENTRICULAR RATE: 89 BPM
GFR AFRICAN AMERICAN: >60
GFR NON-AFRICAN AMERICAN: >60
GLUCOSE BLD-MCNC: 179 MG/DL (ref 70–99)
GLUCOSE BLD-MCNC: 272 MG/DL (ref 70–99)
HCT VFR BLD CALC: 33.7 % (ref 36–48)
HDLC SERPL-MCNC: 40 MG/DL (ref 40–60)
HEMOGLOBIN: 11.4 G/DL (ref 12–16)
LDL CHOLESTEROL CALCULATED: 97 MG/DL
MCH RBC QN AUTO: 33.8 PG (ref 26–34)
MCHC RBC AUTO-ENTMCNC: 33.8 G/DL (ref 31–36)
MCV RBC AUTO: 100.1 FL (ref 80–100)
PDW BLD-RTO: 15 % (ref 12.4–15.4)
PERFORMED ON: ABNORMAL
PLATELET # BLD: 295 K/UL (ref 135–450)
PMV BLD AUTO: 7.7 FL (ref 5–10.5)
POTASSIUM SERPL-SCNC: 4.1 MMOL/L (ref 3.5–5.1)
RBC # BLD: 3.37 M/UL (ref 4–5.2)
SODIUM BLD-SCNC: 135 MMOL/L (ref 136–145)
TRIGL SERPL-MCNC: 87 MG/DL (ref 0–150)
VLDLC SERPL CALC-MCNC: 17 MG/DL
WBC # BLD: 9.5 K/UL (ref 4–11)

## 2020-02-12 PROCEDURE — 85027 COMPLETE CBC AUTOMATED: CPT

## 2020-02-12 PROCEDURE — 93005 ELECTROCARDIOGRAM TRACING: CPT | Performed by: INTERNAL MEDICINE

## 2020-02-12 PROCEDURE — 2580000003 HC RX 258: Performed by: INTERNAL MEDICINE

## 2020-02-12 PROCEDURE — G0378 HOSPITAL OBSERVATION PER HR: HCPCS

## 2020-02-12 PROCEDURE — 80061 LIPID PANEL: CPT

## 2020-02-12 PROCEDURE — 93010 ELECTROCARDIOGRAM REPORT: CPT | Performed by: INTERNAL MEDICINE

## 2020-02-12 PROCEDURE — 80048 BASIC METABOLIC PNL TOTAL CA: CPT

## 2020-02-12 PROCEDURE — 6370000000 HC RX 637 (ALT 250 FOR IP): Performed by: INTERNAL MEDICINE

## 2020-02-12 PROCEDURE — 94761 N-INVAS EAR/PLS OXIMETRY MLT: CPT

## 2020-02-12 PROCEDURE — 94640 AIRWAY INHALATION TREATMENT: CPT

## 2020-02-12 PROCEDURE — 99217 PR OBSERVATION CARE DISCHARGE MANAGEMENT: CPT | Performed by: INTERNAL MEDICINE

## 2020-02-12 RX ORDER — CLOPIDOGREL BISULFATE 75 MG/1
75 TABLET ORAL DAILY
Qty: 90 TABLET | Refills: 3 | Status: SHIPPED | OUTPATIENT
Start: 2020-02-13 | End: 2021-04-02 | Stop reason: ALTCHOICE

## 2020-02-12 RX ORDER — ATORVASTATIN CALCIUM 40 MG/1
40 TABLET, FILM COATED ORAL NIGHTLY
Qty: 90 TABLET | Refills: 3 | Status: SHIPPED | OUTPATIENT
Start: 2020-02-12 | End: 2020-02-20 | Stop reason: SINTOL

## 2020-02-12 RX ADMIN — INSULIN LISPRO 3 UNITS: 100 INJECTION, SOLUTION INTRAVENOUS; SUBCUTANEOUS at 08:57

## 2020-02-12 RX ADMIN — FUROSEMIDE 40 MG: 40 TABLET ORAL at 08:54

## 2020-02-12 RX ADMIN — PREGABALIN 75 MG: 75 CAPSULE ORAL at 08:54

## 2020-02-12 RX ADMIN — PANTOPRAZOLE SODIUM 40 MG: 40 TABLET, DELAYED RELEASE ORAL at 05:26

## 2020-02-12 RX ADMIN — SPIRONOLACTONE 25 MG: 25 TABLET ORAL at 08:55

## 2020-02-12 RX ADMIN — VENLAFAXINE HYDROCHLORIDE 75 MG: 37.5 CAPSULE, EXTENDED RELEASE ORAL at 08:54

## 2020-02-12 RX ADMIN — CALCIUM 500 MG: 500 TABLET ORAL at 08:54

## 2020-02-12 RX ADMIN — FOLIC ACID 1000 MCG: 1 TABLET ORAL at 08:54

## 2020-02-12 RX ADMIN — CLOPIDOGREL 75 MG: 75 TABLET, FILM COATED ORAL at 08:53

## 2020-02-12 RX ADMIN — LOSARTAN POTASSIUM 50 MG: 25 TABLET ORAL at 08:56

## 2020-02-12 RX ADMIN — CARVEDILOL 12.5 MG: 6.25 TABLET, FILM COATED ORAL at 08:54

## 2020-02-12 RX ADMIN — ASPIRIN 81 MG: 81 TABLET, COATED ORAL at 08:53

## 2020-02-12 RX ADMIN — LEVOTHYROXINE SODIUM 75 MCG: 75 TABLET ORAL at 05:26

## 2020-02-12 RX ADMIN — INSULIN GLARGINE 25 UNITS: 100 INJECTION, SOLUTION SUBCUTANEOUS at 08:56

## 2020-02-12 RX ADMIN — Medication 10 ML: at 09:01

## 2020-02-12 RX ADMIN — GLYCOPYRROLATE AND FORMOTEROL FUMARATE 2 PUFF: 9; 4.8 AEROSOL, METERED RESPIRATORY (INHALATION) at 08:16

## 2020-02-12 ASSESSMENT — PAIN SCALES - GENERAL
PAINLEVEL_OUTOF10: 0

## 2020-02-12 NOTE — PROGRESS NOTES
Discharge instructions reviewed with patient and family member. Patient and family verbalized understanding. All home medications have been reviewed, questions answered and patient voiced understanding. All medication side effects reviewed and patient and family verbalized understanding. Patient given prescriptions, discharge instructions, and appointment times. Patient discharged to home with family via private car. Taken to lobby via wheelchair. Follow up appointment(s) reviewed with patient and all attempts made to schedule within 7 days of discharge. [facility]  Depression Screen    1. During the past month, have you often been bothered by feeling down, depressed, or hopeless? Under the doctor's care   2. During the past month, have you often been bothered by little interest or pleasure in       doing things?    Under the doctor's care

## 2020-02-12 NOTE — PLAN OF CARE
Problem: Fluid Volume - Imbalance:  Goal: Ability to maintain vital signs within normal range will improve to within specified parameters  Description  Ability to maintain vital signs within normal range will improve to within specified parameters  Outcome: Ongoing   Vital signs remain within normal and normal sinus rhythm post stenting.

## 2020-02-12 NOTE — PLAN OF CARE
Post procedure site check completed. Surgical site is within normal limits and appears to be free of complications. Right groin is bruised, no hematoma, soft. All concerns were reviewed and questions answered. Patient verbalized understanding.

## 2020-02-12 NOTE — PLAN OF CARE
Problem: Pain:  Goal: Control of acute pain  Description  Control of acute pain  Outcome: Ongoing  Note:   Pt alert and oriented. Pt able to communicate present pain and use the pain scale appropriately. Nonpharmacological pain reducers and pain medication offered as needed. Will cont to monitor. Problem: Falls - Risk of:  Goal: Will remain free from falls  Description  Will remain free from falls  Outcome: Ongoing  Note:   Pt free from falls this shift, bed side table next to bed, call light in reach, bed in lowest position, wheels locked. Encouraged to call for any assistance      Problem: HEMODYNAMIC STATUS  Goal: Patient has stable vital signs and fluid balance  Outcome: Ongoing  Note:   Pulse rate and rhythm, peripheral pulses, and capillary refill assessed every shift with assessment. General color and body temperature monitored throughout shift and with vitals. Assess for edema with head to toe assessment. Administer treatments and medications as ordered. Monitor patient's weight.

## 2020-02-17 RX ORDER — FOLIC ACID 1 MG/1
TABLET ORAL
Qty: 30 TABLET | Refills: 3 | Status: SHIPPED | OUTPATIENT
Start: 2020-02-17 | End: 2020-06-30 | Stop reason: SDUPTHER

## 2020-02-20 ENCOUNTER — OFFICE VISIT (OUTPATIENT)
Dept: CARDIOLOGY CLINIC | Age: 75
End: 2020-02-20
Payer: MEDICARE

## 2020-02-20 VITALS
OXYGEN SATURATION: 97 % | HEART RATE: 95 BPM | WEIGHT: 201 LBS | HEIGHT: 65 IN | DIASTOLIC BLOOD PRESSURE: 78 MMHG | BODY MASS INDEX: 33.49 KG/M2 | SYSTOLIC BLOOD PRESSURE: 122 MMHG

## 2020-02-20 PROCEDURE — G8399 PT W/DXA RESULTS DOCUMENT: HCPCS | Performed by: NURSE PRACTITIONER

## 2020-02-20 PROCEDURE — 1090F PRES/ABSN URINE INCON ASSESS: CPT | Performed by: NURSE PRACTITIONER

## 2020-02-20 PROCEDURE — G9899 SCRN MAM PERF RSLTS DOC: HCPCS | Performed by: NURSE PRACTITIONER

## 2020-02-20 PROCEDURE — G8417 CALC BMI ABV UP PARAM F/U: HCPCS | Performed by: NURSE PRACTITIONER

## 2020-02-20 PROCEDURE — 1036F TOBACCO NON-USER: CPT | Performed by: NURSE PRACTITIONER

## 2020-02-20 PROCEDURE — 99214 OFFICE O/P EST MOD 30 MIN: CPT | Performed by: NURSE PRACTITIONER

## 2020-02-20 PROCEDURE — 1123F ACP DISCUSS/DSCN MKR DOCD: CPT | Performed by: NURSE PRACTITIONER

## 2020-02-20 PROCEDURE — 3017F COLORECTAL CA SCREEN DOC REV: CPT | Performed by: NURSE PRACTITIONER

## 2020-02-20 PROCEDURE — G8427 DOCREV CUR MEDS BY ELIG CLIN: HCPCS | Performed by: NURSE PRACTITIONER

## 2020-02-20 PROCEDURE — 4040F PNEUMOC VAC/ADMIN/RCVD: CPT | Performed by: NURSE PRACTITIONER

## 2020-02-20 PROCEDURE — G8484 FLU IMMUNIZE NO ADMIN: HCPCS | Performed by: NURSE PRACTITIONER

## 2020-02-20 RX ORDER — ROSUVASTATIN CALCIUM 5 MG/1
5 TABLET, COATED ORAL DAILY
Qty: 30 TABLET | Refills: 5 | Status: SHIPPED | OUTPATIENT
Start: 2020-02-20 | End: 2020-06-22 | Stop reason: SDUPTHER

## 2020-02-20 NOTE — PROGRESS NOTES
[facility]     Outpatient Follow Up Note    CHIEF COMPLAINT / HPI: Hospital Follow Up secondary to CAD/ Cardiomyopathy/ Hypertension/ and Hyperlipidemia     Hospital record has been reviewed  Hospital Course progressed as follows:     Hospital Course: Shakira Emery was admitted for elective cardiac cath after abnormal stress. Cath showed:PCI: LAD 90% to 0% mid with a 2.5 mm x 15 mm Medtronic resolute [place] KYLAH.  Patient was discharge in stable condition. Shakira Emery is 76 y.o. female who presents today for a routine follow up after a recent hospitalization related to the above mentioned issues. Subjective:   Since the time of discharge, the patient admits their symptoms have improved. Currently the patient denies significant chest pain. There is no associated dyspnea on exertion. The patient is not experiencing palpitations. These symptoms are improving over the last few weeks. With regard to medication therapy the patient has been compliant with prescribed regimen. They have tolerated therapy to date.      Past Medical History:   Diagnosis Date    CAD (coronary artery disease)     Cancer ([facility] [address]     basal cell skin    Cardiomyopathy ([facility] [address]     [facility]     Hyperlipidemia     Hypertension     Hypothyroidism     Psoriasis      Social History:    Social History     Tobacco Use   Smoking Status Former Smoker    Packs/day: 0.50    Years: 35.00    Pack years: 17.50    Types: Cigarettes    Last attempt to quit: 1991    Years since quittin.6   Smokeless Tobacco Never Used   Tobacco Comment    started to smoke at 21 / smoked up to 0.5 ppd for 35 yrs off and  on /      Current Medications:  Current Outpatient Medications   Medication Sig Dispense Refill    folic acid (FOLVITE) 1 MG tablet TAKE ONE TABLET BY MOUTH DAILY 30 tablet 3    atorvastatin (LIPITOR) 40 MG tablet Take 1 tablet by mouth nightly 90 tablet 3    clopidogrel (PLAVIX) 75 MG tablet Take 1 tablet by mouth daily 90 tablet 3    venlafaxine (EFFEXOR XR) 75 MG extended release capsule Take 75 mg by mouth daily      pregabalin (LYRICA) 75 MG capsule Take 75 mg by mouth 2 times daily.  omeprazole (PRILOSEC) 20 MG delayed release capsule Take 1 capsule by mouth daily 30 capsule 3    losartan (COZAAR) 50 MG tablet TAKE 1 TABLET BY MOUTH TWO  TIMES DAILY 180 tablet 4    spironolactone (ALDACTONE) 25 MG tablet TAKE 1 TABLET BY MOUTH  DAILY 90 tablet 3    carvedilol (COREG) 25 MG tablet TAKE ONE-HALF TABLET BY  MOUTH TWICE A DAY 90 tablet 3    acetaminophen (TYLENOL) 500 MG tablet Take 500 mg by mouth every 6 hours as needed for Pain      furosemide (LASIX) 40 MG tablet Take 1 tablet by mouth daily (Patient taking differently: Take 40 mg by mouth daily as needed ) 90 tablet 3    umeclidinium-vilanterol (ANORO ELLIPTA) 62.5-25 MCG/INH AEPB inhaler Inhale 1 puff into the lungs daily 4 each 0    aspirin 81 MG tablet Take 81 mg by mouth every other day       HYDROcodone-acetaminophen (NORCO) 5-325 MG per tablet Take 1 tablet by mouth every 6 hours as needed for Pain.  diclofenac sodium 1 % GEL Apply 4 g topically 4 times daily. 3 Tube 3    Insulin Glargine (LANTUS SC) Inject 25 Units into the skin 2 times daily.  Insulin Aspart (NOVOLOG FLEXPEN SC) Inject  into the skin.  levothyroxine (SYNTHROID) 100 MCG tablet Take 75 mcg by mouth daily       estrogens, conjugated, (PREMARIN) 0.625 MG tablet Take 0.625 mg by mouth every 72 hours.  calcium carbonate (OSCAL) 500 MG TABS tablet Take 500 mg by mouth daily.  perphenazine 2 MG tablet Take 2 mg by mouth nightly. No current facility-administered medications for this visit. REVIEW OF SYSTEMS:   CONSTITUTIONAL: No major weight gain or loss, fatigue, weakness, night sweats or fever. There's been no change in energy level, sleep pattern, or activity level.      HEENT: No new vision difficulties or ringing in the ears. RESPIRATORY: No new SOB, PND, orthopnea or cough. CARDIOVASCULAR: See HPI  GI: No nausea, vomiting, diarrhea, constipation, abdominal pain or changes in bowel habits. : No urinary frequency, urgency, incontinence hematuria or dysuria. SKIN: No cyanosis or skin lesions. MUSCULOSKELETAL: No new muscle or joint pain. NEUROLOGICAL: No syncope or TIA-like symptoms. PSYCHIATRIC: No anxiety, pain, insomnia or depression    Objective:   PHYSICAL EXAM:        VITALS:    Vitals:    02/20/20 0910   BP: 122/78   Pulse: 95   SpO2: 97%         CONSTITUTIONAL: Cooperative, no apparent distress, and appears well nourished / developed  NEUROLOGIC:  Awake and orientated to person, place and time. PSYCH: Calm affect. SKIN: Warm and dry. HEENT: Sclera non-icteric, normocephalic, neck supple, no elevation of JVP, normal carotid pulses with no bruits and thyroid normal size. LUNGS:  No increased work of breathing and clear to auscultation, no crackles or wheezing. CARDIOVASCULAR:  Regular rate and rhythm with no murmurs, gallops, rubs, or abnormal heart sounds, normal PMI. The apical impulses not displaced. Heart tones are crisp and normal                                                                                          Cervical veins are not engorged                 JVP less than 8 cm H2O                                                                              The carotid upstroke is normal in amplitude and contour without delay or bruit    ABDOMEN:  Normal bowel sounds, non-distended and non-tender to palpation   EXT: No edema, no calf tenderness. Pulses are present bilaterally.  Right groin site looks unremarkable    DATA:    Lab Results   Component Value Date    ALT 16 01/28/2020    AST 15 01/28/2020    ALKPHOS 133 (H) 01/28/2020    BILITOT 0.5 01/28/2020     Lab Results   Component Value Date    CREATININE 0.7 02/12/2020    BUN 10 02/12/2020     (L) 02/12/2020 pressure secondary to incomplete TR jet   envelope. Assessment:     1. CAD (coronary artery disease):   Patient denies any anginal symptoms    2/11/2020: PCI: LAD 90% to 0% mid with a 2.5 mm x 15 mm Medtronic resolute [place] KYLAH. Cath 2001> Minimal LAD (30% first diagonal), medical management. Plan: continue ASA, Plavix, Losartan, Coreg, and Crestor    2. HTN (hypertension)   Today's B/P- 122/78  Stable, continue current medications    3. Cardiomyopathy:   Stable. ECHO 1/30/2020: 45-50%, unchanged  Continue coreg and losartan   ECHO 2009> Trace MR and AL  EF 50%  ECHO  8/14/15> EF 45-50%       4. Rheumatoid arthritis: On methotrexate, stable  5. Hyperlipemia: switch Lipitor to Crestor due to statin intolerance    2/12/2020: HDL: 40, LDL: 97    Patient  is stable since hospital discharge. Plan:   Switch Lipitor to Crestor - statin intolerance   Patient deny cardiac rehab  Lab: Lipid panel and CMP in two months   Follow up in three months     I have addressed the patient's cardiac risk factors and adjusted pharmacologic treatment as needed. In addition, I have reinforced the need for patient directed risk factor modification. Further evaluation will be based upon the patient's clinical course and testing results. All questions and concerns were addressed to the patient/family. Alternatives to  treatment were discussed. The patient  currently  is not smoking. The risks related to smoking were reviewed with the patient. Recommend maintaining a smoke-free lifestyle. Products available for smoking cessation were discussed. Daily weight, low sodium diet were discussed. Patient instructed to call the office with a weight gain: > 3 lbs over night or 5 lbs in one week; swelling, SOB/orthopnea/PND    Dual Antiplatelet therapy has been prescribed for this patient. Education conducted on adverse reactions including bleeding was discussed. The patient verbalizes understanding.     Pt is on a BB  Pt is on an ace-i/ARB  Pt is on a statin    Saturated fat diet discussed  Exercise program discussed    Thank you for allowing to us to participate in the care of Ana Bishop CNP

## 2020-03-02 ENCOUNTER — OFFICE VISIT (OUTPATIENT)
Dept: ENT CLINIC | Age: 75
End: 2020-03-02
Payer: MEDICARE

## 2020-03-02 VITALS
HEIGHT: 65 IN | BODY MASS INDEX: 33.32 KG/M2 | DIASTOLIC BLOOD PRESSURE: 73 MMHG | WEIGHT: 200 LBS | SYSTOLIC BLOOD PRESSURE: 124 MMHG | HEART RATE: 96 BPM

## 2020-03-02 PROCEDURE — 4040F PNEUMOC VAC/ADMIN/RCVD: CPT | Performed by: OTOLARYNGOLOGY

## 2020-03-02 PROCEDURE — 3017F COLORECTAL CA SCREEN DOC REV: CPT | Performed by: OTOLARYNGOLOGY

## 2020-03-02 PROCEDURE — G8427 DOCREV CUR MEDS BY ELIG CLIN: HCPCS | Performed by: OTOLARYNGOLOGY

## 2020-03-02 PROCEDURE — 1036F TOBACCO NON-USER: CPT | Performed by: OTOLARYNGOLOGY

## 2020-03-02 PROCEDURE — G9899 SCRN MAM PERF RSLTS DOC: HCPCS | Performed by: OTOLARYNGOLOGY

## 2020-03-02 PROCEDURE — G8484 FLU IMMUNIZE NO ADMIN: HCPCS | Performed by: OTOLARYNGOLOGY

## 2020-03-02 PROCEDURE — 1090F PRES/ABSN URINE INCON ASSESS: CPT | Performed by: OTOLARYNGOLOGY

## 2020-03-02 PROCEDURE — 99203 OFFICE O/P NEW LOW 30 MIN: CPT | Performed by: OTOLARYNGOLOGY

## 2020-03-02 PROCEDURE — G8399 PT W/DXA RESULTS DOCUMENT: HCPCS | Performed by: OTOLARYNGOLOGY

## 2020-03-02 PROCEDURE — 1123F ACP DISCUSS/DSCN MKR DOCD: CPT | Performed by: OTOLARYNGOLOGY

## 2020-03-02 PROCEDURE — G8417 CALC BMI ABV UP PARAM F/U: HCPCS | Performed by: OTOLARYNGOLOGY

## 2020-03-02 NOTE — PROGRESS NOTES
normal bilaterally. INSPECTION, HEAD AND FACE:  Normal with no masses, lesions, tenderness, or deformities. FACIAL STRENGTH, MOTION:  Facial nerve function intact and equal bilaterally for all 5 branches. SINUSES:  Frontal sinuses and maxillary sinuses nontender, bilaterally. HEARING ASSESSMENT:  Hearing was intact to finger rub at the external meatus bilaterally. Tuning fork tests, using a 512 Hz tuning fork, showed the Luna test was heard in both ears. The Rinne test showed air conduction greater than bone conduction bilaterally. EXTERNAL EAR/NOSE:  The pinnae, mastoids, and external nose were normal bilaterally. (+) EARS, OTOMICROSCOPY: There was cerumen accumulation in the right EAC which was removed with a wire loop. The external auditory canals appeared to be normal bilaterally. The tympanic membranes appeared to be normal bilaterally, including normal pneumatic mobility.  (+) NOSE: There was a septal perforation in the anterior nasal septum. Remainder the nasal septum was midline and normal.  The inferior turbinates were normal bilaterally. Nasal mucosa and nasal secretions were normal bilaterally. LIPS, TEETH AND GUMS:  Normal.  OROPHARYNX/ORAL CAVITY:  Tonsils 1-2+/ 1-2+ and normal.  Normal mucosa with no ulcerations, masses, lesions, erythema, exudate, or other abnormalities. NECK:  Normal with no masses, tenderness, or tracheal deviation, and with normal laryngeal crepitus. THYROID:  Normal, with no nodules, goiter, or tenderness bilaterally. LYMPH NODES, CERVICAL, FACIAL AND SUPRACLAVICULAR:  No lymphadenopathy detected. IMPRESSION / DIAGNOSES / ORDERS:       Vi Mckeon was seen today for otalgia and hearing loss. Diagnoses and all orders for this visit:    Hearing loss of left ear, unspecified hearing loss type    Otalgia of left ear  Comments:  Intermittent fleeting sharp pains. RECOMMENDATIONS/PLAN:    1. Audiogram, Dr. Soledad Messina.     2. Return for recheck/follow-up after hearing test.           >>> Please note that this note was completed using Dragon voice recognition transcription. Every effort was made to ensure accuracy; however, inadvertent  transcription errors may be present despite my best efforts to edit errors.

## 2020-03-06 ENCOUNTER — TELEPHONE (OUTPATIENT)
Dept: CARDIOLOGY CLINIC | Age: 75
End: 2020-03-06

## 2020-03-06 RX ORDER — FUROSEMIDE 40 MG/1
40 TABLET ORAL DAILY PRN
Qty: 30 TABLET | Refills: 1 | Status: SHIPPED | OUTPATIENT
Start: 2020-03-06 | End: 2020-06-23

## 2020-03-06 NOTE — TELEPHONE ENCOUNTER
Last OV KKNP 2/20/20  1. CAD (coronary artery disease):   Patient denies any anginal symptoms    2/11/2020: PCI: LAD 90% to 0% mid with a 2.5 mm x 15 mm Medtronic resolute [place] KYLAH. Cath 2001> Minimal LAD (30% first diagonal), medical management.    Plan: continue ASA, Plavix, Losartan, Coreg, and Crestor    2. HTN (hypertension)   Today's B/P- 122/78  Stable, continue current medications    3. Cardiomyopathy:   Stable.  ECHO 1/30/2020: 45-50%, unchanged  Continue coreg and losartan   ECHO 2009> Trace MR and IA  EF 50%  ECHO  8/14/15> EF 45-50%         4.  Rheumatoid arthritis: On methotrexate, stable  5.  Hyperlipemia: switch Lipitor to Crestor due to statin intolerance    2/12/2020: HDL: 40, LDL: 97     Patient  is stable since hospital discharge.     Plan:   Switch Lipitor to Crestor - statin intolerance   Patient deny cardiac rehab  Lab: Lipid panel and CMP in two months   Follow up in three months

## 2020-03-17 RX ORDER — MELOXICAM 7.5 MG/1
TABLET ORAL
Qty: 90 TABLET | Refills: 0 | Status: SHIPPED | OUTPATIENT
Start: 2020-03-17 | End: 2020-06-05 | Stop reason: SDUPTHER

## 2020-05-21 ENCOUNTER — TELEPHONE (OUTPATIENT)
Dept: INTERNAL MEDICINE CLINIC | Age: 75
End: 2020-05-21

## 2020-05-22 ENCOUNTER — TELEPHONE (OUTPATIENT)
Dept: PULMONOLOGY | Age: 75
End: 2020-05-22

## 2020-05-22 RX ORDER — UMECLIDINIUM BROMIDE AND VILANTEROL TRIFENATATE 62.5; 25 UG/1; UG/1
1 POWDER RESPIRATORY (INHALATION) DAILY
Qty: 3 EACH | Refills: 3 | Status: SHIPPED | OUTPATIENT
Start: 2020-05-22 | End: 2020-10-09

## 2020-06-05 ENCOUNTER — OFFICE VISIT (OUTPATIENT)
Dept: CARDIOLOGY CLINIC | Age: 75
End: 2020-06-05
Payer: MEDICARE

## 2020-06-05 ENCOUNTER — TELEPHONE (OUTPATIENT)
Dept: INTERNAL MEDICINE CLINIC | Age: 75
End: 2020-06-05

## 2020-06-05 ENCOUNTER — HOSPITAL ENCOUNTER (OUTPATIENT)
Age: 75
Discharge: HOME | End: 2020-06-05
Payer: MEDICARE

## 2020-06-05 VITALS
HEIGHT: 65 IN | BODY MASS INDEX: 34.62 KG/M2 | DIASTOLIC BLOOD PRESSURE: 70 MMHG | OXYGEN SATURATION: 94 % | WEIGHT: 207.8 LBS | HEART RATE: 79 BPM | SYSTOLIC BLOOD PRESSURE: 118 MMHG

## 2020-06-05 LAB
CHOLESTEROL, TOTAL: 121 MG/DL (ref 0–199)
HDLC SERPL-MCNC: 51 MG/DL (ref 40–60)
LDL CHOLESTEROL CALCULATED: 55 MG/DL
TRIGL SERPL-MCNC: 73 MG/DL (ref 0–150)
VLDLC SERPL CALC-MCNC: 15 MG/DL

## 2020-06-05 PROCEDURE — 1123F ACP DISCUSS/DSCN MKR DOCD: CPT | Performed by: INTERNAL MEDICINE

## 2020-06-05 PROCEDURE — G8417 CALC BMI ABV UP PARAM F/U: HCPCS | Performed by: INTERNAL MEDICINE

## 2020-06-05 PROCEDURE — G8427 DOCREV CUR MEDS BY ELIG CLIN: HCPCS | Performed by: INTERNAL MEDICINE

## 2020-06-05 PROCEDURE — G8399 PT W/DXA RESULTS DOCUMENT: HCPCS | Performed by: INTERNAL MEDICINE

## 2020-06-05 PROCEDURE — 1036F TOBACCO NON-USER: CPT | Performed by: INTERNAL MEDICINE

## 2020-06-05 PROCEDURE — 36415 COLL VENOUS BLD VENIPUNCTURE: CPT

## 2020-06-05 PROCEDURE — 3017F COLORECTAL CA SCREEN DOC REV: CPT | Performed by: INTERNAL MEDICINE

## 2020-06-05 PROCEDURE — G9899 SCRN MAM PERF RSLTS DOC: HCPCS | Performed by: INTERNAL MEDICINE

## 2020-06-05 PROCEDURE — 4040F PNEUMOC VAC/ADMIN/RCVD: CPT | Performed by: INTERNAL MEDICINE

## 2020-06-05 PROCEDURE — 80061 LIPID PANEL: CPT

## 2020-06-05 PROCEDURE — 99214 OFFICE O/P EST MOD 30 MIN: CPT | Performed by: INTERNAL MEDICINE

## 2020-06-05 PROCEDURE — 1090F PRES/ABSN URINE INCON ASSESS: CPT | Performed by: INTERNAL MEDICINE

## 2020-06-05 RX ORDER — MELOXICAM 15 MG/1
TABLET ORAL
Qty: 90 TABLET | Refills: 0 | Status: SHIPPED | OUTPATIENT
Start: 2020-06-05 | End: 2020-08-31

## 2020-06-05 SDOH — HEALTH STABILITY: MENTAL HEALTH: HOW OFTEN DO YOU HAVE A DRINK CONTAINING ALCOHOL?: 2-4 TIMES A MONTH

## 2020-06-05 SDOH — HEALTH STABILITY: MENTAL HEALTH: HOW MANY STANDARD DRINKS CONTAINING ALCOHOL DO YOU HAVE ON A TYPICAL DAY?: 1 OR 2

## 2020-06-05 NOTE — PROGRESS NOTES
[address] [place] [address]       Referring Provider:  Nathan Ponce MD     Chief Complaint   Patient presents with    3 Month Follow-Up    Hypertension    Hyperlipidemia    Coronary Artery Disease      History of Present Illness:  Mrs. Jose R Fatima is here today for follow up of her CAD, HTN, HLD, CM. She is past smoker. She  sees Dr. Azucena Goldmann for COPD. She was seen 1/2020 with issues with dyspnea on exertion. Stress myoview obtained showing inferior-lateral ischemia. Cath with 90% LAD stenosis. PCI with Dr. Purvi Lowery. She has severe back issues. Breathing improved. Very inactive. Walks with walker at home. Wheelchair today. She has had statin intolerance. Tolerating crestor 5. Tolerating and ASA without bleeding    Past Medical History:   has a past medical history of CAD (coronary artery disease), Cancer ([address] Cardiomyopathy ([address] CHF (congestive heart failure) ([address] Hyperlipidemia, Hypertension, Hypothyroidism, and Psoriasis. Surgical History:   has a past surgical history that includes bladder repair; Hysterectomy; malignant skin lesion excision; and shoulder surgery (03/2013). Social History:   reports that she quit smoking about 28 years ago. Her smoking use included cigarettes. She has a 17.50 pack-year smoking history. She has never used smokeless tobacco. She reports current alcohol use of about 1.0 - 2.0 standard drinks of alcohol per week. She reports that she does not use drugs. Family History:  family history includes Arthritis in her sister; Cancer in her mother; Heart Disease in her maternal grandmother and paternal grandmother. Home Medications:  Prior to Visit Medications    Medication Sig Taking?  Authorizing Provider   umeclidinium-vilanterol (ANORO ELLIPTA) 62.5-25 MCG/INH AEPB inhaler Inhale 1 puff into the lungs daily Yes Debra Dixon MD   meloxicam (MOBIC) 7.5 MG tablet TAKE ONE TABLET BY MOUTH DAILY Yes Nikki Cisneros MD   furosemide (LASIX) 40 MG tablet Take 1 tablet by mouth daily as needed (as needed) Yes BRIANNE Ocasio CNP   rosuvastatin (CRESTOR) 5 MG tablet Take 1 tablet by mouth daily Yes BRIANNE Ocasio CNP   folic acid (FOLVITE) 1 MG tablet TAKE ONE TABLET BY MOUTH DAILY Yes Rafaela Chauhan MD   clopidogrel (PLAVIX) 75 MG tablet Take 1 tablet by mouth daily Yes Joselito St MD   venlafaxine (EFFEXOR XR) 75 MG extended release capsule Take 75 mg by mouth daily Yes Historical Provider, MD   pregabalin (LYRICA) 75 MG capsule Take 75 mg by mouth 2 times daily. Yes Historical Provider, MD   omeprazole (PRILOSEC) 20 MG delayed release capsule Take 1 capsule by mouth daily Yes BRIANNE Keen CNP   losartan (COZAAR) 50 MG tablet TAKE 1 TABLET BY MOUTH TWO  TIMES DAILY Yes Joselito St MD   spironolactone (ALDACTONE) 25 MG tablet TAKE 1 TABLET BY MOUTH  DAILY Yes Joselito St MD   carvedilol (COREG) 25 MG tablet TAKE ONE-HALF TABLET BY  MOUTH TWICE A DAY Yes Joselito St MD   acetaminophen (TYLENOL) 500 MG tablet Take 500 mg by mouth every 6 hours as needed for Pain Yes Historical Provider, MD   umeclidinium-vilanterol (ANORO ELLIPTA) 62.5-25 MCG/INH AEPB inhaler Inhale 1 puff into the lungs daily Yes Efe Chin MD   aspirin 81 MG tablet Take 81 mg by mouth every other day  Yes Historical Provider, MD   HYDROcodone-acetaminophen (NORCO) 5-325 MG per tablet Take 1 tablet by mouth every 6 hours as needed for Pain. Yes Historical Provider, MD   diclofenac sodium 1 % GEL Apply 4 g topically 4 times daily.  Yes Trudy Barahona MD   Insulin Glargine (LANTUS SC) Inject 30 Units into the skin 2 times daily (before meals)  Yes Historical Provider, MD   Insulin Aspart (NOVOLOG FLEXPEN SC) Inject 10 Units into the skin as needed 1-2 daily prn per pt Yes Historical Provider, MD   levothyroxine (SYNTHROID) 100 MCG tablet Take 75 mcg by mouth daily  Yes Historical Provider, MD   calcium carbonate (OSCAL) 500 MG TABS tablet Take 500 mg by mouth daily. Yes Historical Provider, MD   perphenazine 2 MG tablet Take 2 mg by mouth nightly. Yes Historical Provider, MD       Allergies:  No known allergies and Statins     [x] Medications and dosages reviewed. ROS:  [x]Full ROS obtained and negative except as mentioned in HPI      Physical Examination:    Vitals:    06/05/20 1052   BP: 118/70   Pulse: 79   SpO2: 94%        · GENERAL: Well developed, well nourished, No acute distress  · NEUROLOGICAL: Alert and oriented, no motor abnormalities  · PSYCH: Calm affect  · SKIN: Warm and dry, No rash or bruising  · HEENT: Normocephalic, Sclera non-icteric, mucus membranes moist  · NECK: supple, JVP normal  · CAROTID: Normal upstroke, no bruits  · CARDIAC: Normal PMI, regular rate and rhythm, normal S1S2, No murmur, rub, or gallop  · RESPIRATORY: Limited air movement,Mild wheeze  · EXTREMITIES: no LE edema  · MUSCULOSKELETAL: No joint swelling or tenderness, no chest wall tenderness  · GASTROINTESTINAL: normal bowel sounds, soft, non-tender, no bruit    Assessment:     1. CAD (coronary artery disease):   LAD stent 2/2020. Stable. Dual antiplatelet x 9-31 months  Continue medical management  Cath 2001> Minimal LAD (30% first diagonal), medical management. 2. HTN (hypertension)   Well controlled. Continue current medications  /70 (Site: Left Upper Arm, Position: Sitting, Cuff Size: Large Adult)   Pulse 79   Ht 5' 5\" (1.651 m)   Wt 207 lb 12.8 oz (94.3 kg)   SpO2 94%   Breastfeeding No   BMI 34.58 kg/m²      3. Cardiomyopathy:   Stable. ECHO  (1/2020)45-50%, unchanged  Myoview 2/2020-61%  Continue coreg and losartan   ECHO 2009> Trace MR and IN  EF 50%  ECHO  8/14/15> EF 45-50%   4. SOB:  Stable, seeing Pulmonary. F/u with CT an PFTs planned  Severe copd by pft's -- Follows with Dr. Kade Faustin. Uses inhalers which has helped her breathing. ECHO 8/14/15> Mild global hk, ef 45-50%  Chest x-ray 1/15> Normal  CT chest 12/14> Opacities   5.   Rheumatoid

## 2020-06-08 ENCOUNTER — TELEPHONE (OUTPATIENT)
Dept: CARDIOLOGY CLINIC | Age: 75
End: 2020-06-08

## 2020-06-09 RX ORDER — CARVEDILOL 25 MG/1
TABLET ORAL
Qty: 90 TABLET | Refills: 3 | Status: SHIPPED | OUTPATIENT
Start: 2020-06-09 | End: 2021-05-03 | Stop reason: SDUPTHER

## 2020-06-09 RX ORDER — SPIRONOLACTONE 25 MG/1
25 TABLET ORAL DAILY
Qty: 90 TABLET | Refills: 3 | Status: SHIPPED | OUTPATIENT
Start: 2020-06-09 | End: 2020-10-09

## 2020-06-12 ENCOUNTER — HOSPITAL ENCOUNTER (OUTPATIENT)
Dept: CT IMAGING | Age: 75
Discharge: HOME | End: 2020-06-12
Payer: MEDICARE

## 2020-06-12 ENCOUNTER — HOSPITAL ENCOUNTER (OUTPATIENT)
Dept: PULMONOLOGY | Age: 75
Discharge: HOME | End: 2020-06-12
Payer: MEDICARE

## 2020-06-12 PROCEDURE — 71250 CT THORAX DX C-: CPT

## 2020-06-19 RX ORDER — LOSARTAN POTASSIUM 50 MG/1
TABLET ORAL
Qty: 180 TABLET | Refills: 4 | Status: SHIPPED | OUTPATIENT
Start: 2020-06-19 | End: 2021-05-03 | Stop reason: SDUPTHER

## 2020-06-22 RX ORDER — ROSUVASTATIN CALCIUM 5 MG/1
5 TABLET, COATED ORAL DAILY
Qty: 30 TABLET | Refills: 5 | Status: SHIPPED | OUTPATIENT
Start: 2020-06-22 | End: 2020-10-09 | Stop reason: SDUPTHER

## 2020-06-23 RX ORDER — FUROSEMIDE 40 MG/1
TABLET ORAL
Qty: 30 TABLET | Refills: 0 | Status: SHIPPED | OUTPATIENT
Start: 2020-06-23 | End: 2020-10-09 | Stop reason: SDUPTHER

## 2020-06-30 ENCOUNTER — OFFICE VISIT (OUTPATIENT)
Dept: RHEUMATOLOGY | Age: 75
End: 2020-06-30
Payer: MEDICARE

## 2020-06-30 PROCEDURE — 99441 PR PHYS/QHP TELEPHONE EVALUATION 5-10 MIN: CPT | Performed by: INTERNAL MEDICINE

## 2020-06-30 RX ORDER — FOLIC ACID 1 MG/1
TABLET ORAL
Qty: 90 TABLET | Refills: 1 | Status: SHIPPED | OUTPATIENT
Start: 2020-06-30 | End: 2021-01-04

## 2020-07-02 ENCOUNTER — TELEPHONE (OUTPATIENT)
Dept: PULMONOLOGY | Age: 75
End: 2020-07-02

## 2020-07-02 NOTE — TELEPHONE ENCOUNTER
Pt left a VM requesting a call back from Lucina Alvarez in regards to a refill and an appt she was told to make.      Pt # [phone]

## 2020-07-06 RX ORDER — OMEPRAZOLE 20 MG/1
20 CAPSULE, DELAYED RELEASE ORAL DAILY
Qty: 30 CAPSULE | Refills: 1 | Status: SHIPPED | OUTPATIENT
Start: 2020-07-06 | End: 2020-10-09

## 2020-07-06 NOTE — TELEPHONE ENCOUNTER
Patient called and said she needs a prescription for Omeprazole [facility].

## 2020-07-11 LAB
ALBUMIN SERPL-MCNC: 3.8 G/DL (ref 3.4–5)
ALP BLD-CCNC: 112 U/L (ref 40–129)
ALT SERPL-CCNC: 11 U/L (ref 10–40)
AST SERPL-CCNC: 21 U/L (ref 15–37)
BASOPHILS ABSOLUTE: 0 K/UL (ref 0–0.2)
BASOPHILS RELATIVE PERCENT: 0.5 %
BILIRUB SERPL-MCNC: 0.5 MG/DL (ref 0–1)
BILIRUBIN DIRECT: <0.2 MG/DL (ref 0–0.3)
BILIRUBIN, INDIRECT: NORMAL MG/DL (ref 0–1)
CREAT SERPL-MCNC: 1.1 MG/DL (ref 0.6–1.2)
EOSINOPHILS ABSOLUTE: 0.3 K/UL (ref 0–0.6)
EOSINOPHILS RELATIVE PERCENT: 3.5 %
GFR AFRICAN AMERICAN: 59
GFR NON-AFRICAN AMERICAN: 48
HCT VFR BLD CALC: 37.5 % (ref 36–48)
HEMOGLOBIN: 12.1 G/DL (ref 12–16)
LYMPHOCYTES ABSOLUTE: 1 K/UL (ref 1–5.1)
LYMPHOCYTES RELATIVE PERCENT: 12.2 %
MCH RBC QN AUTO: 30.8 PG (ref 26–34)
MCHC RBC AUTO-ENTMCNC: 32.3 G/DL (ref 31–36)
MCV RBC AUTO: 95.2 FL (ref 80–100)
MONOCYTES ABSOLUTE: 0.5 K/UL (ref 0–1.3)
MONOCYTES RELATIVE PERCENT: 5.6 %
NEUTROPHILS ABSOLUTE: 6.5 K/UL (ref 1.7–7.7)
NEUTROPHILS RELATIVE PERCENT: 78.2 %
PDW BLD-RTO: 16.6 % (ref 12.4–15.4)
PLATELET # BLD: 262 K/UL (ref 135–450)
PMV BLD AUTO: 8.7 FL (ref 5–10.5)
RBC # BLD: 3.94 M/UL (ref 4–5.2)
TOTAL PROTEIN: 6.7 G/DL (ref 6.4–8.2)
WBC # BLD: 8.3 K/UL (ref 4–11)

## 2020-07-13 ENCOUNTER — HOSPITAL ENCOUNTER (OUTPATIENT)
Age: 75
Discharge: HOME | End: 2020-07-13
Payer: MEDICARE

## 2020-07-13 ENCOUNTER — OFFICE VISIT (OUTPATIENT)
Dept: PRIMARY CARE CLINIC | Age: 75
End: 2020-07-13
Payer: MEDICARE

## 2020-07-13 LAB
A/G RATIO: 1 (ref 1.1–2.2)
ALBUMIN SERPL-MCNC: 3.7 G/DL (ref 3.4–5)
ALP BLD-CCNC: 101 U/L (ref 40–129)
ALT SERPL-CCNC: 10 U/L (ref 10–40)
ANION GAP SERPL CALCULATED.3IONS-SCNC: 13 MMOL/L (ref 3–16)
AST SERPL-CCNC: 21 U/L (ref 15–37)
BACTERIA: ABNORMAL /HPF
BILIRUB SERPL-MCNC: 0.6 MG/DL (ref 0–1)
BILIRUBIN URINE: NEGATIVE
BLOOD, URINE: NEGATIVE
BUN BLDV-MCNC: 13 MG/DL (ref 7–20)
CALCIUM SERPL-MCNC: 9.8 MG/DL (ref 8.3–10.6)
CHLORIDE BLD-SCNC: 99 MMOL/L (ref 99–110)
CHOLESTEROL, TOTAL: 119 MG/DL (ref 0–199)
CLARITY: ABNORMAL
CO2: 21 MMOL/L (ref 21–32)
COLOR: YELLOW
CREAT SERPL-MCNC: 0.8 MG/DL (ref 0.6–1.2)
EPITHELIAL CELLS, UA: 5 /HPF (ref 0–5)
GFR AFRICAN AMERICAN: >60
GFR NON-AFRICAN AMERICAN: >60
GLOBULIN: 3.7 G/DL
GLUCOSE BLD-MCNC: 149 MG/DL (ref 70–99)
GLUCOSE URINE: NEGATIVE MG/DL
HCT VFR BLD CALC: 39.2 % (ref 36–48)
HDLC SERPL-MCNC: 45 MG/DL (ref 40–60)
HEMOGLOBIN: 12.8 G/DL (ref 12–16)
HYALINE CASTS: 2 /LPF (ref 0–8)
KETONES, URINE: ABNORMAL MG/DL
LDL CHOLESTEROL CALCULATED: 49 MG/DL
LEUKOCYTE ESTERASE, URINE: ABNORMAL
MCH RBC QN AUTO: 30.7 PG (ref 26–34)
MCHC RBC AUTO-ENTMCNC: 32.5 G/DL (ref 31–36)
MCV RBC AUTO: 94.4 FL (ref 80–100)
MICROSCOPIC EXAMINATION: YES
NITRITE, URINE: NEGATIVE
PDW BLD-RTO: 16.3 % (ref 12.4–15.4)
PH UA: 6 (ref 5–8)
PLATELET # BLD: 275 K/UL (ref 135–450)
PMV BLD AUTO: 8.8 FL (ref 5–10.5)
POTASSIUM SERPL-SCNC: 5.3 MMOL/L (ref 3.5–5.1)
PROTEIN UA: NEGATIVE MG/DL
RBC # BLD: 4.16 M/UL (ref 4–5.2)
RBC UA: ABNORMAL /HPF (ref 0–4)
SODIUM BLD-SCNC: 133 MMOL/L (ref 136–145)
SPECIFIC GRAVITY UA: 1.02 (ref 1–1.03)
TOTAL PROTEIN: 7.4 G/DL (ref 6.4–8.2)
TRIGL SERPL-MCNC: 125 MG/DL (ref 0–150)
TSH SERPL DL<=0.05 MIU/L-ACNC: 8.7 UIU/ML (ref 0.27–4.2)
URINE TYPE: ABNORMAL
UROBILINOGEN, URINE: 1 E.U./DL
VITAMIN D 25-HYDROXY: 50.5 NG/ML
VLDLC SERPL CALC-MCNC: 25 MG/DL
WBC # BLD: 9.5 K/UL (ref 4–11)
WBC UA: 25 /HPF (ref 0–5)

## 2020-07-13 PROCEDURE — 80061 LIPID PANEL: CPT

## 2020-07-13 PROCEDURE — 84443 ASSAY THYROID STIM HORMONE: CPT

## 2020-07-13 PROCEDURE — 99211 OFF/OP EST MAY X REQ PHY/QHP: CPT | Performed by: NURSE PRACTITIONER

## 2020-07-13 PROCEDURE — 80053 COMPREHEN METABOLIC PANEL: CPT

## 2020-07-13 PROCEDURE — 82306 VITAMIN D 25 HYDROXY: CPT

## 2020-07-13 PROCEDURE — 85027 COMPLETE CBC AUTOMATED: CPT

## 2020-07-13 PROCEDURE — 81001 URINALYSIS AUTO W/SCOPE: CPT

## 2020-07-13 NOTE — PROGRESS NOTES
Teagan Worrell received a viral test for COVID-19. They were educated on isolation and quarantine as appropriate. For any symptoms, they were directed to seek care from their PCP, given contact information to establish with a doctor, directed to an urgent care or the emergency room.

## 2020-07-13 NOTE — PATIENT INSTRUCTIONS

## 2020-07-18 LAB
SARS-COV-2: NOT DETECTED
SOURCE: NORMAL

## 2020-08-07 ENCOUNTER — HOSPITAL ENCOUNTER (OUTPATIENT)
Dept: GENERAL RADIOLOGY | Age: 75
Discharge: HOME | End: 2020-08-07
Payer: MEDICARE

## 2020-08-07 ENCOUNTER — HOSPITAL ENCOUNTER (OUTPATIENT)
Dept: MRI IMAGING | Age: 75
Discharge: HOME | End: 2020-08-07
Payer: MEDICARE

## 2020-08-07 PROCEDURE — 72100 X-RAY EXAM L-S SPINE 2/3 VWS: CPT

## 2020-08-07 PROCEDURE — 72148 MRI LUMBAR SPINE W/O DYE: CPT

## 2020-08-31 RX ORDER — MELOXICAM 15 MG/1
TABLET ORAL
Qty: 90 TABLET | Refills: 0 | Status: SHIPPED | OUTPATIENT
Start: 2020-08-31 | End: 2020-11-23

## 2020-10-09 ENCOUNTER — HOSPITAL ENCOUNTER (OUTPATIENT)
Age: 75
Discharge: HOME | End: 2020-10-09
Payer: MEDICARE

## 2020-10-09 ENCOUNTER — OFFICE VISIT (OUTPATIENT)
Dept: CARDIOLOGY CLINIC | Age: 75
End: 2020-10-09
Payer: MEDICARE

## 2020-10-09 VITALS
DIASTOLIC BLOOD PRESSURE: 76 MMHG | BODY MASS INDEX: 32.65 KG/M2 | WEIGHT: 196 LBS | OXYGEN SATURATION: 92 % | RESPIRATION RATE: 18 BRPM | SYSTOLIC BLOOD PRESSURE: 126 MMHG | HEIGHT: 65 IN | HEART RATE: 80 BPM

## 2020-10-09 LAB
ANION GAP SERPL CALCULATED.3IONS-SCNC: 12 MMOL/L (ref 3–16)
BUN BLDV-MCNC: 11 MG/DL (ref 7–20)
CALCIUM SERPL-MCNC: 9.2 MG/DL (ref 8.3–10.6)
CHLORIDE BLD-SCNC: 93 MMOL/L (ref 99–110)
CO2: 23 MMOL/L (ref 21–32)
CREAT SERPL-MCNC: 0.8 MG/DL (ref 0.6–1.2)
GFR AFRICAN AMERICAN: >60
GFR NON-AFRICAN AMERICAN: >60
GLUCOSE BLD-MCNC: 344 MG/DL (ref 70–99)
POTASSIUM SERPL-SCNC: 5.3 MMOL/L (ref 3.5–5.1)
SODIUM BLD-SCNC: 128 MMOL/L (ref 136–145)
TSH SERPL DL<=0.05 MIU/L-ACNC: 6.49 UIU/ML (ref 0.27–4.2)

## 2020-10-09 PROCEDURE — 1123F ACP DISCUSS/DSCN MKR DOCD: CPT | Performed by: INTERNAL MEDICINE

## 2020-10-09 PROCEDURE — 3017F COLORECTAL CA SCREEN DOC REV: CPT | Performed by: INTERNAL MEDICINE

## 2020-10-09 PROCEDURE — G8484 FLU IMMUNIZE NO ADMIN: HCPCS | Performed by: INTERNAL MEDICINE

## 2020-10-09 PROCEDURE — 4040F PNEUMOC VAC/ADMIN/RCVD: CPT | Performed by: INTERNAL MEDICINE

## 2020-10-09 PROCEDURE — G8399 PT W/DXA RESULTS DOCUMENT: HCPCS | Performed by: INTERNAL MEDICINE

## 2020-10-09 PROCEDURE — 99214 OFFICE O/P EST MOD 30 MIN: CPT | Performed by: INTERNAL MEDICINE

## 2020-10-09 PROCEDURE — 84443 ASSAY THYROID STIM HORMONE: CPT

## 2020-10-09 PROCEDURE — 80048 BASIC METABOLIC PNL TOTAL CA: CPT

## 2020-10-09 PROCEDURE — G8417 CALC BMI ABV UP PARAM F/U: HCPCS | Performed by: INTERNAL MEDICINE

## 2020-10-09 PROCEDURE — 1036F TOBACCO NON-USER: CPT | Performed by: INTERNAL MEDICINE

## 2020-10-09 PROCEDURE — G8427 DOCREV CUR MEDS BY ELIG CLIN: HCPCS | Performed by: INTERNAL MEDICINE

## 2020-10-09 PROCEDURE — 36415 COLL VENOUS BLD VENIPUNCTURE: CPT

## 2020-10-09 PROCEDURE — 1090F PRES/ABSN URINE INCON ASSESS: CPT | Performed by: INTERNAL MEDICINE

## 2020-10-09 RX ORDER — GLIMEPIRIDE 4 MG/1
4 TABLET ORAL
COMMUNITY
End: 2022-06-06 | Stop reason: ALTCHOICE

## 2020-10-09 RX ORDER — ROSUVASTATIN CALCIUM 5 MG/1
5 TABLET, COATED ORAL DAILY
Qty: 90 TABLET | Refills: 4 | Status: SHIPPED | OUTPATIENT
Start: 2020-10-09 | End: 2021-05-03 | Stop reason: SDUPTHER

## 2020-10-09 RX ORDER — FUROSEMIDE 40 MG/1
TABLET ORAL
Qty: 90 TABLET | Refills: 1 | Status: SHIPPED | OUTPATIENT
Start: 2020-10-09 | End: 2021-05-03 | Stop reason: SDUPTHER

## 2020-10-09 NOTE — PROGRESS NOTES
mouth every morning (before breakfast) Yes Historical Provider, MD   meloxicam (MOBIC) 15 MG tablet TAKE ONE TABLET BY MOUTH DAILY Yes Nick Smiley MD   folic acid (FOLVITE) 1 MG tablet TAKE ONE TABLET BY MOUTH DAILY Yes Nick Smiley MD   furosemide (LASIX) 40 MG tablet TAKE ONE TABLET BY MOUTH DAILY AS NEEDED Yes Tanesha Man APRN - CNP   rosuvastatin (CRESTOR) 5 MG tablet Take 1 tablet by mouth daily Yes Tanesha Man APRN - CNP   losartan (COZAAR) 50 MG tablet TAKE 1 TABLET BY MOUTH TWO  TIMES DAILY Yes Lul Vasquez MD   carvedilol (COREG) 25 MG tablet TAKE ONE-HALF TABLET BY  MOUTH TWICE A DAY Yes Lul Vasquez MD   clopidogrel (PLAVIX) 75 MG tablet Take 1 tablet by mouth daily Yes Lul Vasquez MD   venlafaxine (EFFEXOR XR) 75 MG extended release capsule Take 75 mg by mouth daily Yes Historical Provider, MD   pregabalin (LYRICA) 75 MG capsule Take 75 mg by mouth 2 times daily. Yes Historical Provider, MD   omeprazole (PRILOSEC) 20 MG delayed release capsule Take 1 capsule by mouth daily Yes Carmelina Villafana APRN - CNP   acetaminophen (TYLENOL) 500 MG tablet Take 500 mg by mouth every 6 hours as needed for Pain Yes Historical Provider, MD   umeclidinium-vilanterol (ANORO ELLIPTA) 62.5-25 MCG/INH AEPB inhaler Inhale 1 puff into the lungs daily Yes Ana Laura Brown MD   aspirin 81 MG tablet Take 81 mg by mouth every other day  Yes Historical Provider, MD   HYDROcodone-acetaminophen (NORCO) 5-325 MG per tablet Take 1 tablet by mouth every 6 hours as needed for Pain. Yes Historical Provider, MD   diclofenac sodium 1 % GEL Apply 4 g topically 4 times daily.  Yes Florence Coleman MD   Insulin Glargine (LANTUS SC) Inject 30 Units into the skin 2 times daily (before meals)  Yes Historical Provider, MD   Insulin Aspart (NOVOLOG FLEXPEN SC) Inject 10 Units into the skin as needed 1-2 daily prn per pt Yes Historical Provider, MD   levothyroxine (SYNTHROID) 100 MCG tablet Take 75 mcg by mouth daily  Yes Historical Provider, MD   calcium carbonate (OSCAL) 500 MG TABS tablet Take 500 mg by mouth daily. Yes Historical Provider, MD   perphenazine 2 MG tablet Take 2 mg by mouth nightly. Yes Historical Provider, MD   spironolactone (ALDACTONE) 25 MG tablet TAKE 1 TABLET BY MOUTH  DAILY  Patient not taking: Reported on 10/9/2020  Alexandro Cid MD       Allergies:  No known allergies and Statins     [x] Medications and dosages reviewed. ROS:  [x]Full ROS obtained and negative except as mentioned in HPI      Physical Examination:    Vitals:    10/09/20 1251   BP: 126/76   Pulse: 80   Resp: 18   SpO2: 92%        · GENERAL: Well developed, well nourished, No acute distress in wheelchair   · NEUROLOGICAL: Alert and oriented, no motor abnormalities  · PSYCH: Calm affect  · SKIN: Warm and dry, No rash or bruising  · HEENT: Normocephalic, Sclera non-icteric, mucus membranes moist  · NECK: supple, JVP normal  · CAROTID: Normal upstroke, no bruits  · CARDIAC: Normal PMI, regular rate and rhythm, normal S1S2, no murmur, rub, or gallop  · RESPIRATORY: Limited air movement, clear bilaterally  · EXTREMITIES: no LE edema  · MUSCULOSKELETAL: No joint swelling or tenderness, no chest wall tenderness  · GASTROINTESTINAL: normal bowel sounds, soft, non-tender, no bruit    Assessment:     1. CAD (coronary artery disease):   LAD stent 2/2020. Stable. Continue Dual antiplatelet x 3-21 months  Continue medical management  Cath 2001> Minimal LAD (30% first diagonal), medical management. 2. HTN (hypertension)   Controlled. Continue current medical therapy  /76 (Site: Right Upper Arm, Position: Sitting, Cuff Size: Medium Adult)   Pulse 80   Resp 18   Ht 5' 5\" (1.651 m)   Wt 196 lb (88.9 kg)   SpO2 92%   BMI 32.62 kg/m²      3. Cardiomyopathy:   Stable. ECHO  (1/2020)45-50%, unchanged. Follow  Myoview 2/2020-61%  Continue coreg and losartan   ECHO 2009> Trace MR and RI  EF 50%  ECHO  8/14/15> EF 45-50%   4. SOB:  Stable, seeing Pulmonary. PFTs planned. Severe copd by pft's -- Follows with Dr. Evelyn Gale. Uses inhalers which has helped her breathing. ECHO 8/14/15> Mild global hk, ef 45-50%  Chest x-ray 1/15> Normal  CT chest 12/14> Opacities   5. Rheumatoid arthritis: On methotrexate, stable  6. Hyperlipemia: On crestor 5. LDL=49. Well controlled      PLAN:  Stable cardiac status  Reschedule PFTs  If she needs back surgery I would prefer waiting to Feb for 12 months antiplatelets.   Could proceed if really needed to go sooner  F/u 6 months  Chem-7 and TSH today    Thank you for allowing me to participate in the care of this individual.      Malaika Aguayo M.D., [facility]

## 2020-10-09 NOTE — PATIENT INSTRUCTIONS
No medication changes  Labs today to recheck thyroid and potassium  Follow up in 6 months    Call office if thinking about back surgery

## 2020-10-13 ENCOUNTER — TELEPHONE (OUTPATIENT)
Dept: CARDIOLOGY CLINIC | Age: 75
End: 2020-10-13

## 2020-10-13 NOTE — TELEPHONE ENCOUNTER
Discussed with patient. She eats a lot of tomatoes and potatoes. Reviewed high potassium foods and not taking in any extra potassium. She is not taking Aldactone anymore. Lab order placed. She verbalized understanding.      ----- Message from Ha Banda MD sent at 10/13/2020  3:01 PM EDT -----  Potassium remains elevated and thyroid remains off. Talk to PCP about thyroid. Make sure she is no longer taking aldactone. Needs to limit high K+ foods.   Repeat chem in 3-4 weeks    MMK

## 2020-10-14 ENCOUNTER — TELEPHONE (OUTPATIENT)
Dept: CARDIOLOGY CLINIC | Age: 75
End: 2020-10-14

## 2020-10-14 NOTE — TELEPHONE ENCOUNTER
Pt calling needs her lab results faxed to her PCP at [phone] Ph [phone] before 1 pm today?  Pls call to advise Thank you

## 2020-11-02 ENCOUNTER — OFFICE VISIT (OUTPATIENT)
Dept: PRIMARY CARE CLINIC | Age: 75
End: 2020-11-02
Payer: MEDICARE

## 2020-11-02 PROCEDURE — 99211 OFF/OP EST MAY X REQ PHY/QHP: CPT | Performed by: NURSE PRACTITIONER

## 2020-11-02 NOTE — PROGRESS NOTES
Vicky Vang received a viral test for COVID-19. They were educated on isolation and quarantine as appropriate. For any symptoms, they were directed to seek care from their PCP, given contact information to establish with a doctor, directed to an urgent care or the emergency room.

## 2020-11-03 LAB — SARS-COV-2: NOT DETECTED

## 2020-11-06 ENCOUNTER — HOSPITAL ENCOUNTER (OUTPATIENT)
Age: 75
Discharge: HOME | End: 2020-11-06
Payer: MEDICARE

## 2020-11-06 ENCOUNTER — HOSPITAL ENCOUNTER (OUTPATIENT)
Dept: PULMONOLOGY | Age: 75
Discharge: HOME | End: 2020-11-06
Payer: MEDICARE

## 2020-11-06 VITALS — OXYGEN SATURATION: 94 % | RESPIRATION RATE: 18 BRPM | HEART RATE: 79 BPM

## 2020-11-06 LAB
ANION GAP SERPL CALCULATED.3IONS-SCNC: 11 MMOL/L (ref 3–16)
BUN BLDV-MCNC: 9 MG/DL (ref 7–20)
CALCIUM SERPL-MCNC: 8.7 MG/DL (ref 8.3–10.6)
CHLORIDE BLD-SCNC: 91 MMOL/L (ref 99–110)
CO2: 25 MMOL/L (ref 21–32)
CREAT SERPL-MCNC: 0.8 MG/DL (ref 0.6–1.2)
DLCO %PRED: 48 %
DLCO PRED: NORMAL
DLCO/VA %PRED: NORMAL
DLCO/VA PRED: NORMAL
DLCO/VA: NORMAL
DLCO: NORMAL
EXPIRATORY TIME: NORMAL
FEF 25-75% %PRED-PRE: NORMAL
FEF 25-75% PRED: NORMAL
FEF 25-75%-PRE: NORMAL
FEV1 %PRED-PRE: 44 %
FEV1 PRED: NORMAL
FEV1/FVC %PRED-PRE: NORMAL
FEV1/FVC PRED: NORMAL
FEV1/FVC: 53 %
FEV1: NORMAL
FVC %PRED-PRE: NORMAL
FVC PRED: NORMAL
FVC: NORMAL
GAW %PRED: NORMAL
GAW PRED: NORMAL
GAW: NORMAL
GFR AFRICAN AMERICAN: >60
GFR NON-AFRICAN AMERICAN: >60
GLUCOSE BLD-MCNC: 359 MG/DL (ref 70–99)
IC %PRED: NORMAL
IC PRED: NORMAL
IC: NORMAL
MVV %PRED-PRE: NORMAL
MVV PRED: NORMAL
MVV-PRE: NORMAL
PEF %PRED-PRE: NORMAL
PEF PRED: NORMAL
PEF-PRE: NORMAL
POTASSIUM SERPL-SCNC: 5.1 MMOL/L (ref 3.5–5.1)
RAW %PRED: NORMAL
RAW PRED: NORMAL
RAW: NORMAL
RV %PRED: NORMAL
RV PRED: NORMAL
RV: NORMAL
SODIUM BLD-SCNC: 127 MMOL/L (ref 136–145)
SVC %PRED: NORMAL
SVC PRED: NORMAL
SVC: NORMAL
TLC %PRED: 109 %
TLC PRED: NORMAL
TLC: NORMAL
VA %PRED: NORMAL
VA PRED: NORMAL
VA: NORMAL
VTG %PRED: NORMAL
VTG PRED: NORMAL
VTG: NORMAL

## 2020-11-06 PROCEDURE — 94726 PLETHYSMOGRAPHY LUNG VOLUMES: CPT

## 2020-11-06 PROCEDURE — 94729 DIFFUSING CAPACITY: CPT

## 2020-11-06 PROCEDURE — 94200 LUNG FUNCTION TEST (MBC/MVV): CPT

## 2020-11-06 PROCEDURE — 80048 BASIC METABOLIC PNL TOTAL CA: CPT

## 2020-11-06 PROCEDURE — 94760 N-INVAS EAR/PLS OXIMETRY 1: CPT

## 2020-11-06 PROCEDURE — 36415 COLL VENOUS BLD VENIPUNCTURE: CPT

## 2020-11-06 PROCEDURE — 94010 BREATHING CAPACITY TEST: CPT

## 2020-11-06 RX ORDER — ALBUTEROL SULFATE 90 UG/1
4 AEROSOL, METERED RESPIRATORY (INHALATION) ONCE
Status: CANCELLED | OUTPATIENT
Start: 2020-11-06 | End: 2020-11-06

## 2020-11-06 ASSESSMENT — PULMONARY FUNCTION TESTS
FEV1_PERCENT_PREDICTED_PRE: 44
FEV1/FVC: 53

## 2020-11-09 NOTE — PROCEDURES
[facility]                     [facility], [address]                               PULMONARY FUNCTION    PATIENT NAME: Vern Fisher                      :        1945  MED REC NO:   [id number]                          ROOM:  ACCOUNT NO:   [de-identified]                           ADMIT DATE: 2020  PROVIDER:     Isabell Reynolds MD    DATE OF PROCEDURE:  2020    Spirometry reveals decreased FVC at 1.78 liters, which is 63% predicted. FEV1 is decreased at 0.94 liters, which is 44% predicted. FEV1/FVC  ratio is decreased at 53%. Expiratory flow rates are decreased. There  is no postbronchodilator study. Lung volumes reveal normal total lung  capacity, vital capacity is reduced at 66% predicted, residual volume is  increased at 172% predicted, diffusion capacity is decreased at 48%  predicted. In comparison to a study from 2018, FVC has decreased by  15%, FEV1 has decreased by 10%, diffusion capacity has decreased by 17%. IMPRESSION:  Severe obstructive lung disease with air trapping. This is  worse in comparison to the previous study.         Wing Kingsley MD    D: 2020 13:07:43       T: 2020 17:22:50     [id number]  Job#: [id number]     Doc#: [id number]    CC:

## 2020-11-10 ENCOUNTER — TELEPHONE (OUTPATIENT)
Dept: CARDIOLOGY CLINIC | Age: 75
End: 2020-11-10

## 2020-11-10 NOTE — TELEPHONE ENCOUNTER
Pt calling wanting the lab results faxed over to her PCP Dr Lauren Ayala office Ph [phone] Fax is [phone].  Pls call to advise Thank you

## 2020-11-23 RX ORDER — MELOXICAM 15 MG/1
TABLET ORAL
Qty: 90 TABLET | Refills: 0 | Status: SHIPPED | OUTPATIENT
Start: 2020-11-23 | End: 2021-02-24

## 2020-12-03 ENCOUNTER — VIRTUAL VISIT (OUTPATIENT)
Dept: PULMONOLOGY | Age: 75
End: 2020-12-03
Payer: MEDICARE

## 2020-12-03 PROCEDURE — 99442 PR PHYS/QHP TELEPHONE EVALUATION 11-20 MIN: CPT | Performed by: NURSE PRACTITIONER

## 2020-12-03 ASSESSMENT — ENCOUNTER SYMPTOMS
WHEEZING: 0
ABDOMINAL PAIN: 0
BACK PAIN: 1
COUGH: 0
SHORTNESS OF BREATH: 0
CONSTIPATION: 0
COLOR CHANGE: 0

## 2020-12-03 NOTE — PROGRESS NOTES
[facility] Follow Up Note  Pulmonology Telephone Visit    Pursuant to the emergency declaration under the [facility], [facility] waiver authority and the [employer] Act this Telephone Visit was insisted, with patient's consent, to reduce the patient's risk of exposure to COVID-19 and provide continuity of care for an established patient. The patient was at home, while the provider was at the clinic. Services were provided through a synchronous discussion through a Telephone Call to substitute for in-person clinic visit, and coded as such. Total time spent with patient was 15 minutes. Subjective:   CHIEF COMPLAINT / HPI: SOB, severe COPD   The patient is 76 y.o. female who presents today for a routine follow up visit related to the above mentioned issues. There is a PMH significant for CAD, cardiomyopathy, psoriatic arthritis and stopped MTX and Taltz d/t recurrent infection and HTN. She was last evaluated by me in January and at that time she reported breathing was doing OK but she did notice significant SOB with activity. This is about the same as it was during her last visit. She denies cough. We ordered CT and PFT, the results of which are reviewed below. Presently she reports her SOB is stable with significant FORDE. She doesn't report much of a cough. She continues to struggle with back pain and wondered if that contributes to FORDE. Reflux continues to be an issue for her and she doesn't feel like the omeprazole is working as well as it used to at its current dose. Fortunately she has not had any flares since her last visit. She has been compliant with Anoro, but is reliant on samples. She has not required supplemental O2.         Past Medical History:   Diagnosis Date    CAD (coronary artery disease)     Cancer ([facility] 2011    basal cell skin    Cardiomyopathy ([facility]     CHF (congestive heart failure) ([facility]     Hyperlipidemia     Hypertension     Hypothyroidism     Psoriasis      Social History:    Social History     Tobacco Use   Smoking Status Former Smoker    Packs/day: 0.50    Years: 35.00    Pack years: 17.50    Types: Cigarettes    Last attempt to quit: 1991    Years since quittin.4   Smokeless Tobacco Never Used   Tobacco Comment    started to smoke at 21 / smoked up to 0.5 ppd for 35 yrs off and  on /      Current Medications:     Current Outpatient Medications on File Prior to Visit   Medication Sig Dispense Refill    meloxicam (MOBIC) 15 MG tablet TAKE ONE TABLET BY MOUTH DAILY 90 tablet 0    glimepiride (AMARYL) 4 MG tablet Take 4 mg by mouth every morning (before breakfast)      furosemide (LASIX) 40 MG tablet TAKE ONE TABLET BY MOUTH DAILY AS NEEDED 90 tablet 1    rosuvastatin (CRESTOR) 5 MG tablet Take 1 tablet by mouth daily 90 tablet 4    folic acid (FOLVITE) 1 MG tablet TAKE ONE TABLET BY MOUTH DAILY 90 tablet 1    losartan (COZAAR) 50 MG tablet TAKE 1 TABLET BY MOUTH TWO  TIMES DAILY 180 tablet 4    carvedilol (COREG) 25 MG tablet TAKE ONE-HALF TABLET BY  MOUTH TWICE A DAY 90 tablet 3    clopidogrel (PLAVIX) 75 MG tablet Take 1 tablet by mouth daily 90 tablet 3    venlafaxine (EFFEXOR XR) 75 MG extended release capsule Take 75 mg by mouth daily      pregabalin (LYRICA) 75 MG capsule Take 75 mg by mouth 2 times daily.  omeprazole (PRILOSEC) 20 MG delayed release capsule Take 1 capsule by mouth daily 30 capsule 3    acetaminophen (TYLENOL) 500 MG tablet Take 500 mg by mouth every 6 hours as needed for Pain      umeclidinium-vilanterol (ANORO ELLIPTA) 62.5-25 MCG/INH AEPB inhaler Inhale 1 puff into the lungs daily 4 each 0    aspirin 81 MG tablet Take 81 mg by mouth every other day       HYDROcodone-acetaminophen (NORCO) 5-325 MG per tablet Take 1 tablet by mouth every 6 hours as needed for Pain.       diclofenac sodium 1 % GEL Apply 4 g topically 4 11/6/20:  Spirometry reveals decreased FVC at 1.78 liters, which is 63% predicted. FEV1 is decreased at 0.94 liters, which is 44% predicted. FEV1/FVC  ratio is decreased at 53%. Expiratory flow rates are decreased. There  is no postbronchodilator study. Lung volumes reveal normal total lung  capacity, vital capacity is reduced at 66% predicted, residual volume is  increased at 172% predicted, diffusion capacity is decreased at 48%  predicted. In comparison to a study from 03/2018, FVC has decreased by  15%, FEV1 has decreased by 10%, diffusion capacity has decreased by 17%. IMPRESSION:  Severe obstructive lung disease with air trapping. This is  worse in comparison to the previous study. Assessment / Plan:   1. COPD, severe ([address]  - This was again confirmed on recent PFT  - She reports stable FORDE and cough on Anoro  - She is reliant on samples, we have some set aside for her in the office  - Continue ALMA PRN  - She is primarily complaining of her chronic back pain which does impact her breathing    2. Pulmonary nodules  - These were stable on CT in June  - Repeat CT in 2021    3. GERD without esophagitis  - This is a bit worse   - Increase Omeprazole to 40 x 2 weeks to see if this improves    Return in about 4 months (around 4/3/2021). RTC sooner if symptoms worsen.     Italo Bustillos MSN APRN-ACNP CCRN

## 2020-12-09 ENCOUNTER — VIRTUAL VISIT (OUTPATIENT)
Dept: RHEUMATOLOGY | Age: 75
End: 2020-12-09
Payer: MEDICARE

## 2020-12-09 PROCEDURE — 99441 PR PHYS/QHP TELEPHONE EVALUATION 5-10 MIN: CPT | Performed by: INTERNAL MEDICINE

## 2020-12-09 NOTE — PROGRESS NOTES
Estefania Fulton is a 76 y.o. female evaluated via telephone on 12/9/2020. Consent:  She and/or health care decision maker is aware that that she may receive a bill for this telephone service, depending on her insurance coverage, and has provided verbal consent to proceed: Yes      Documentation:  I communicated with the patient and/or health care decision maker about . Details of this discussion including any medical advice provided:     I affirm this is a Patient Initiated Episode with a Patient who has not had a related appointment within my department in the past 7 days or scheduled within the next 24 hours.     Patient identification was verified at the start of the visit: Yes    Total Time: minutes: 5-10 minutes    Note: not billable if this call serves to triage the patient into an appointment for the relevant concern      Cy Grief

## 2020-12-09 NOTE — PROGRESS NOTES
Subjective:      Patient ID: Mateo Wilkes is a 76 y.o. female. HPI the patient returns for follow-up of psoriatic arthritis. She continues on meloxicam 15 mg a day. She is complaining of increased pain    Review of Systems  Denies swelling of the joints denies abdominal pain denies nausea  Objective:   Physical Exam alert female no acute distress. Assessment:      Psoriatic arthritis possible fibromyalgia      Plan:      Blood work will be obtained to monitor for adverse drug reactions from the nonsteroidal.  She is scheduled to see a pain physician at the end of December and will see her back at the end of January to reevaluate the etiology of her increased discomfort.         Nick Smiley MD

## 2020-12-16 LAB
BASOPHILS ABSOLUTE: 0 K/UL (ref 0–0.2)
BASOPHILS RELATIVE PERCENT: 0.6 %
EOSINOPHILS ABSOLUTE: 0.4 K/UL (ref 0–0.6)
EOSINOPHILS RELATIVE PERCENT: 4.7 %
HCT VFR BLD CALC: 35.7 % (ref 36–48)
HEMOGLOBIN: 11.5 G/DL (ref 12–16)
LYMPHOCYTES ABSOLUTE: 1.2 K/UL (ref 1–5.1)
LYMPHOCYTES RELATIVE PERCENT: 15.7 %
MCH RBC QN AUTO: 30.4 PG (ref 26–34)
MCHC RBC AUTO-ENTMCNC: 32.3 G/DL (ref 31–36)
MCV RBC AUTO: 94.2 FL (ref 80–100)
MONOCYTES ABSOLUTE: 0.6 K/UL (ref 0–1.3)
MONOCYTES RELATIVE PERCENT: 7.4 %
NEUTROPHILS ABSOLUTE: 5.4 K/UL (ref 1.7–7.7)
NEUTROPHILS RELATIVE PERCENT: 71.6 %
PDW BLD-RTO: 15.2 % (ref 12.4–15.4)
PLATELET # BLD: 307 K/UL (ref 135–450)
PMV BLD AUTO: 8.8 FL (ref 5–10.5)
RBC # BLD: 3.79 M/UL (ref 4–5.2)
WBC # BLD: 7.5 K/UL (ref 4–11)

## 2020-12-17 LAB
ALBUMIN SERPL-MCNC: 3.7 G/DL (ref 3.4–5)
ALP BLD-CCNC: 159 U/L (ref 40–129)
ALT SERPL-CCNC: 9 U/L (ref 10–40)
AST SERPL-CCNC: 19 U/L (ref 15–37)
BILIRUB SERPL-MCNC: 0.4 MG/DL (ref 0–1)
BILIRUBIN DIRECT: <0.2 MG/DL (ref 0–0.3)
BILIRUBIN, INDIRECT: ABNORMAL MG/DL (ref 0–1)
CREAT SERPL-MCNC: 0.6 MG/DL (ref 0.6–1.2)
GFR AFRICAN AMERICAN: >60
GFR NON-AFRICAN AMERICAN: >60
TOTAL PROTEIN: 6.6 G/DL (ref 6.4–8.2)

## 2021-01-02 DIAGNOSIS — Z79.899 ENCOUNTER FOR LONG-TERM (CURRENT) USE OF HIGH-RISK MEDICATION: ICD-10-CM

## 2021-01-02 DIAGNOSIS — L40.50 PSORIATIC ARTHRITIS (HCC): ICD-10-CM

## 2021-01-04 RX ORDER — FOLIC ACID 1 MG/1
TABLET ORAL
Qty: 90 TABLET | Refills: 0 | Status: SHIPPED | OUTPATIENT
Start: 2021-01-04 | End: 2021-04-01

## 2021-01-26 ENCOUNTER — OFFICE VISIT (OUTPATIENT)
Dept: RHEUMATOLOGY | Age: 76
End: 2021-01-26
Payer: MEDICARE

## 2021-01-26 VITALS
HEART RATE: 72 BPM | HEIGHT: 65 IN | BODY MASS INDEX: 31.49 KG/M2 | DIASTOLIC BLOOD PRESSURE: 68 MMHG | WEIGHT: 189 LBS | SYSTOLIC BLOOD PRESSURE: 110 MMHG | TEMPERATURE: 97.1 F

## 2021-01-26 DIAGNOSIS — L40.50 PSORIATIC ARTHRITIS (HCC): Primary | ICD-10-CM

## 2021-01-26 PROCEDURE — 3017F COLORECTAL CA SCREEN DOC REV: CPT | Performed by: INTERNAL MEDICINE

## 2021-01-26 PROCEDURE — G8399 PT W/DXA RESULTS DOCUMENT: HCPCS | Performed by: INTERNAL MEDICINE

## 2021-01-26 PROCEDURE — 1123F ACP DISCUSS/DSCN MKR DOCD: CPT | Performed by: INTERNAL MEDICINE

## 2021-01-26 PROCEDURE — G8427 DOCREV CUR MEDS BY ELIG CLIN: HCPCS | Performed by: INTERNAL MEDICINE

## 2021-01-26 PROCEDURE — 4040F PNEUMOC VAC/ADMIN/RCVD: CPT | Performed by: INTERNAL MEDICINE

## 2021-01-26 PROCEDURE — 99213 OFFICE O/P EST LOW 20 MIN: CPT | Performed by: INTERNAL MEDICINE

## 2021-01-26 PROCEDURE — 1090F PRES/ABSN URINE INCON ASSESS: CPT | Performed by: INTERNAL MEDICINE

## 2021-01-26 NOTE — PROGRESS NOTES
Subjective:       Alexander Coyle is a 76 y.o. female patient returns for follow-up of psoriatic arthritis. She is on meloxicam 15 mg a day and since I last saw her she was seen by pain specialist who injected her back in several places and now she is asymptomatic. Review of Systems:    Denies abdominal pain denies nausea denies stiffness        Objective:     /68   Pulse 72   Temp 97.1 °F (36.2 °C)   Ht 5' 5\" (1.651 m)   Wt 189 lb (85.7 kg)   BMI 31.45 kg/m²   Alert female no acute distress. Skin no visible psoriasis. Osteoarthritic changes DIPs and PIPs    Assessment:    Psoriatic arthritis. Osteoarthritis   Plan:    Blood work obtained in December was reviewed. She will continue on meloxicam.  I will see her back in 5 months time.             Radha Quintanilla MD, 1/26/2021 2:03 PM

## 2021-02-24 RX ORDER — MELOXICAM 15 MG/1
TABLET ORAL
Qty: 90 TABLET | Refills: 1 | Status: SHIPPED | OUTPATIENT
Start: 2021-02-24 | End: 2021-06-30 | Stop reason: SDUPTHER

## 2021-04-01 DIAGNOSIS — Z79.899 ENCOUNTER FOR LONG-TERM (CURRENT) USE OF HIGH-RISK MEDICATION: ICD-10-CM

## 2021-04-01 DIAGNOSIS — L40.50 PSORIATIC ARTHRITIS (HCC): ICD-10-CM

## 2021-04-01 RX ORDER — FOLIC ACID 1 MG/1
TABLET ORAL
Qty: 90 TABLET | Refills: 0 | Status: SHIPPED | OUTPATIENT
Start: 2021-04-01 | End: 2021-07-06

## 2021-04-08 ENCOUNTER — OFFICE VISIT (OUTPATIENT)
Dept: PULMONOLOGY | Age: 76
End: 2021-04-08
Payer: MEDICARE

## 2021-04-08 VITALS — DIASTOLIC BLOOD PRESSURE: 64 MMHG | SYSTOLIC BLOOD PRESSURE: 110 MMHG | HEART RATE: 72 BPM | OXYGEN SATURATION: 95 %

## 2021-04-08 DIAGNOSIS — R91.1 PULMONARY NODULE: Primary | ICD-10-CM

## 2021-04-08 DIAGNOSIS — J44.9 COPD, SEVERE (HCC): ICD-10-CM

## 2021-04-08 DIAGNOSIS — K21.9 GERD WITHOUT ESOPHAGITIS: ICD-10-CM

## 2021-04-08 PROCEDURE — 1123F ACP DISCUSS/DSCN MKR DOCD: CPT | Performed by: NURSE PRACTITIONER

## 2021-04-08 PROCEDURE — 1090F PRES/ABSN URINE INCON ASSESS: CPT | Performed by: NURSE PRACTITIONER

## 2021-04-08 PROCEDURE — G8399 PT W/DXA RESULTS DOCUMENT: HCPCS | Performed by: NURSE PRACTITIONER

## 2021-04-08 PROCEDURE — 1036F TOBACCO NON-USER: CPT | Performed by: NURSE PRACTITIONER

## 2021-04-08 PROCEDURE — G8427 DOCREV CUR MEDS BY ELIG CLIN: HCPCS | Performed by: NURSE PRACTITIONER

## 2021-04-08 PROCEDURE — 3017F COLORECTAL CA SCREEN DOC REV: CPT | Performed by: NURSE PRACTITIONER

## 2021-04-08 PROCEDURE — 99213 OFFICE O/P EST LOW 20 MIN: CPT | Performed by: NURSE PRACTITIONER

## 2021-04-08 PROCEDURE — G8926 SPIRO NO PERF OR DOC: HCPCS | Performed by: NURSE PRACTITIONER

## 2021-04-08 PROCEDURE — 3023F SPIROM DOC REV: CPT | Performed by: NURSE PRACTITIONER

## 2021-04-08 PROCEDURE — G8417 CALC BMI ABV UP PARAM F/U: HCPCS | Performed by: NURSE PRACTITIONER

## 2021-04-08 PROCEDURE — 4040F PNEUMOC VAC/ADMIN/RCVD: CPT | Performed by: NURSE PRACTITIONER

## 2021-04-08 ASSESSMENT — ENCOUNTER SYMPTOMS
SHORTNESS OF BREATH: 0
COUGH: 0
ABDOMINAL PAIN: 0
CONSTIPATION: 0
COLOR CHANGE: 0
WHEEZING: 0
BACK PAIN: 1

## 2021-04-08 NOTE — PROGRESS NOTES
[facility] Follow Up Note    Subjective:   CHIEF COMPLAINT / HPI: SOB, severe COPD   The patient is 76 y.o. female who presents today for a routine follow up visit related to the above mentioned issues. There is a PMH significant for CAD, cardiomyopathy, psoriatic arthritis and stopped MTX and Taltz d/t recurrent infection and HTN. She was last evaluated by me in December of last year and at that time she reported breathing was stable with significant FORDE. Presently she reports her significant FORDE is unchanged from baseline. She doesn't report much of a cough. She continues to struggle with back pain and feels like this contributes to her symptoms. Reflux is stable. Fortunately she has not had any flares since her last visit. She has been compliant with Anoro. She has not required supplemental O2.         Past Medical History:   Diagnosis Date    CAD (coronary artery disease)     Cancer ([facility] [address]     basal cell skin    Cardiomyopathy ([facility] [address]     CHF (congestive heart failure) ([facility] [address]     Hyperlipidemia     Hypertension     Hypothyroidism     Psoriasis      Social History:    Social History     Tobacco Use   Smoking Status Former Smoker    Packs/day: 0.50    Years: 35.00    Pack years: 17.50    Types: Cigarettes    Quit date: 1991    Years since quittin.7   Smokeless Tobacco Never Used   Tobacco Comment    started to smoke at 20 / smoked up to 0.5 ppd for 35 yrs off and  on /      Current Medications:     Current Outpatient Medications on File Prior to Visit   Medication Sig Dispense Refill    folic acid (FOLVITE) 1 MG tablet TAKE ONE TABLET BY MOUTH DAILY 90 tablet 0    meloxicam (MOBIC) 15 MG tablet TAKE ONE TABLET BY MOUTH DAILY 90 tablet 1    glimepiride (AMARYL) 4 MG tablet Take 4 mg by mouth every morning (before breakfast)      furosemide (LASIX) 40 MG tablet TAKE ONE TABLET BY MOUTH DAILY AS NEEDED 90 tablet 1    rosuvastatin (CRESTOR) 5 MG tablet Take 1 tablet by mouth daily 90 tablet 4    losartan (COZAAR) 50 MG tablet TAKE 1 TABLET BY MOUTH TWO  TIMES DAILY 180 tablet 4    carvedilol (COREG) 25 MG tablet TAKE ONE-HALF TABLET BY  MOUTH TWICE A DAY 90 tablet 3    venlafaxine (EFFEXOR XR) 75 MG extended release capsule Take 75 mg by mouth daily      pregabalin (LYRICA) 75 MG capsule Take 75 mg by mouth 2 times daily.  omeprazole (PRILOSEC) 20 MG delayed release capsule Take 1 capsule by mouth daily 30 capsule 3    acetaminophen (TYLENOL) 500 MG tablet Take 500 mg by mouth every 6 hours as needed for Pain      umeclidinium-vilanterol (ANORO ELLIPTA) 62.5-25 MCG/INH AEPB inhaler Inhale 1 puff into the lungs daily 4 each 0    aspirin 81 MG tablet Take 81 mg by mouth every other day       HYDROcodone-acetaminophen (NORCO) 5-325 MG per tablet Take 1 tablet by mouth every 6 hours as needed for Pain.  diclofenac sodium 1 % GEL Apply 4 g topically 4 times daily. 3 Tube 3    Insulin Glargine (LANTUS SC) Inject 30 Units into the skin 2 times daily (before meals)       Insulin Aspart (NOVOLOG FLEXPEN SC) Inject 10 Units into the skin as needed 1-2 daily prn per pt      levothyroxine (SYNTHROID) 100 MCG tablet Take 75 mcg by mouth daily       calcium carbonate (OSCAL) 500 MG TABS tablet Take 500 mg by mouth daily.  perphenazine 2 MG tablet Take 2 mg by mouth nightly. No current facility-administered medications on file prior to visit. Review of Systems   Constitutional: Negative for chills and fever. HENT: Negative for congestion, ear pain and postnasal drip. Respiratory: Negative for cough, shortness of breath and wheezing. Cardiovascular: Negative for chest pain and leg swelling. Gastrointestinal: Negative for abdominal pain and constipation. Musculoskeletal: Positive for back pain. Negative for arthralgias and joint swelling. Skin: Negative for color change and pallor.    Allergic/Immunologic: Negative for environmental allergies and food allergies. Psychiatric/Behavioral: Negative for agitation and confusion. Objective:       VITALS:  /64   Pulse 72   SpO2 95% Comment: RA at rest     Physical Exam   Constitutional: She is oriented to person, place, and time. She appears well-developed and well-nourished. No distress. HENT:   Head: Normocephalic and atraumatic. Eyes: Right eye exhibits no discharge. Left eye exhibits no discharge. Neck: No tracheal deviation present. Cardiovascular: Normal rate and regular rhythm. Pulmonary/Chest: No stridor. No respiratory distress. She has no wheezes. She has no rales. She exhibits no tenderness. Abdominal: She exhibits no distension. There is no abdominal tenderness. Musculoskeletal:         General: No edema. Neurological: She is alert and oriented to person, place, and time. Skin: Skin is warm and dry. No rash noted. She is not diaphoretic. Psychiatric: She has a normal mood and affect. Her behavior is normal.   Vitals reviewed. DATA:      Radiology Review:  Pertinent images / reports were reviewed as a part of this visit. CT chest done June 2020:  1. No acute cardiopulmonary disease. 2. Stable tree-in-bud nodularity likely representing sequelae of prior infection/inflammation. 3. No new or dominant pulmonary nodule. 4. Severe atherosclerosis. CT chest done 9/18/19:  Scattered micro nodularity throughout the lungs bilaterally, right greater than left, similar compared to prior, likely postinflammatory-infectious. Last PFTs done 11/6/20:  Spirometry reveals decreased FVC at 1.78 liters, which is 63% predicted. FEV1 is decreased at 0.94 liters, which is 44% predicted. FEV1/FVC  ratio is decreased at 53%. Expiratory flow rates are decreased. There  is no postbronchodilator study.   Lung volumes reveal normal total lung  capacity, vital capacity is reduced at 66% predicted, residual volume is  increased at 172% predicted, diffusion capacity is decreased at 48%  predicted. In comparison to a study from 03/2018, FVC has decreased by  15%, FEV1 has decreased by 10%, diffusion capacity has decreased by 17%. IMPRESSION:  Severe obstructive lung disease with air trapping. This is  worse in comparison to the previous study. Assessment / Plan:   1. Pulmonary nodule  - Micronodularity stable on last CT  - Low Dose Chest CT--pulmonologist/radiologist use only; Future    2. COPD, severe ([address]  - FORDE severe but at baseline   - She continues to tolerate Anoro, continue    3. GERD without esophagitis  - Stable GERD symptoms    Return in about 6 months (around 10/8/2021). RTC sooner if symptoms worsen.     Allie Turner MSN APRN-ACNP CCRN

## 2021-04-09 ENCOUNTER — HOSPITAL ENCOUNTER (OUTPATIENT)
Age: 76
Discharge: HOME | End: 2021-04-09
Payer: MEDICARE

## 2021-04-09 ENCOUNTER — OFFICE VISIT (OUTPATIENT)
Dept: CARDIOLOGY CLINIC | Age: 76
End: 2021-04-09
Payer: MEDICARE

## 2021-04-09 VITALS
BODY MASS INDEX: 31.65 KG/M2 | SYSTOLIC BLOOD PRESSURE: 122 MMHG | HEIGHT: 64 IN | WEIGHT: 185.4 LBS | OXYGEN SATURATION: 99 % | RESPIRATION RATE: 16 BRPM | DIASTOLIC BLOOD PRESSURE: 68 MMHG | HEART RATE: 74 BPM

## 2021-04-09 DIAGNOSIS — I10 ESSENTIAL HYPERTENSION: ICD-10-CM

## 2021-04-09 DIAGNOSIS — I42.9 CARDIOMYOPATHY, UNSPECIFIED TYPE (HCC): Chronic | ICD-10-CM

## 2021-04-09 DIAGNOSIS — I25.10 CORONARY ARTERY DISEASE INVOLVING NATIVE CORONARY ARTERY OF NATIVE HEART WITHOUT ANGINA PECTORIS: ICD-10-CM

## 2021-04-09 DIAGNOSIS — R73.9 CHRONIC HYPERGLYCEMIA: ICD-10-CM

## 2021-04-09 DIAGNOSIS — R73.9 CHRONIC HYPERGLYCEMIA: Primary | ICD-10-CM

## 2021-04-09 DIAGNOSIS — E78.5 HYPERLIPIDEMIA, UNSPECIFIED HYPERLIPIDEMIA TYPE: ICD-10-CM

## 2021-04-09 LAB
ANION GAP SERPL CALCULATED.3IONS-SCNC: 10 MMOL/L (ref 3–16)
BUN BLDV-MCNC: 11 MG/DL (ref 7–20)
CALCIUM SERPL-MCNC: 8.5 MG/DL (ref 8.3–10.6)
CHLORIDE BLD-SCNC: 97 MMOL/L (ref 99–110)
CO2: 23 MMOL/L (ref 21–32)
CREAT SERPL-MCNC: 0.6 MG/DL (ref 0.6–1.2)
GFR AFRICAN AMERICAN: >60
GFR NON-AFRICAN AMERICAN: >60
GLUCOSE BLD-MCNC: 292 MG/DL (ref 70–99)
POTASSIUM SERPL-SCNC: 5.1 MMOL/L (ref 3.5–5.1)
SODIUM BLD-SCNC: 130 MMOL/L (ref 136–145)

## 2021-04-09 PROCEDURE — 1090F PRES/ABSN URINE INCON ASSESS: CPT | Performed by: INTERNAL MEDICINE

## 2021-04-09 PROCEDURE — 80048 BASIC METABOLIC PNL TOTAL CA: CPT

## 2021-04-09 PROCEDURE — G8417 CALC BMI ABV UP PARAM F/U: HCPCS | Performed by: INTERNAL MEDICINE

## 2021-04-09 PROCEDURE — 3017F COLORECTAL CA SCREEN DOC REV: CPT | Performed by: INTERNAL MEDICINE

## 2021-04-09 PROCEDURE — 1036F TOBACCO NON-USER: CPT | Performed by: INTERNAL MEDICINE

## 2021-04-09 PROCEDURE — 1123F ACP DISCUSS/DSCN MKR DOCD: CPT | Performed by: INTERNAL MEDICINE

## 2021-04-09 PROCEDURE — G8427 DOCREV CUR MEDS BY ELIG CLIN: HCPCS | Performed by: INTERNAL MEDICINE

## 2021-04-09 PROCEDURE — 83036 HEMOGLOBIN GLYCOSYLATED A1C: CPT

## 2021-04-09 PROCEDURE — G8399 PT W/DXA RESULTS DOCUMENT: HCPCS | Performed by: INTERNAL MEDICINE

## 2021-04-09 PROCEDURE — 99214 OFFICE O/P EST MOD 30 MIN: CPT | Performed by: INTERNAL MEDICINE

## 2021-04-09 PROCEDURE — 36415 COLL VENOUS BLD VENIPUNCTURE: CPT

## 2021-04-09 PROCEDURE — 4040F PNEUMOC VAC/ADMIN/RCVD: CPT | Performed by: INTERNAL MEDICINE

## 2021-04-09 NOTE — PROGRESS NOTES
[facility]       Referring Provider:  Martha Landon MD     Chief Complaint   Patient presents with    6 Month Follow-Up     SOB episodes      History of Present Illness:  Mrs. Magnolia Epps is here today for follow up of her CAD, HTN, HLD, CM. She is past smoker. She  sees Dr. Ger Rios for COPD. She was seen 1/2020 with issues with dyspnea on exertion. Stress myoview obtained showing inferior-lateral ischemia. Cath with 90% LAD stenosis. PCI with Dr. Phani Pereira. Aldactone stopped 7/2020 due to elevated potassium. She is doing well from a cardiac standpoint. No chest pain. She continues to have shortness of breath. PFTs with severe lung diease. She is concerned about LV function. Still with severe Back pain. Not having surgery. Stopped plavix as it has been > 1 year since PCI    Past Medical History:   has a past medical history of CAD (coronary artery disease), Cancer ([address] Cardiomyopathy ([facility] [address] CHF (congestive heart failure) ([facility] [address] Hyperlipidemia, Hypertension, Hypothyroidism, and Psoriasis. Surgical History:   has a past surgical history that includes bladder repair; Hysterectomy; malignant skin lesion excision; and shoulder surgery (03/2013). Social History:   reports that she quit smoking about 29 years ago. Her smoking use included cigarettes. She has a 17.50 pack-year smoking history. She has never used smokeless tobacco. She reports current alcohol use of about 1.0 - 2.0 standard drinks of alcohol per week. She reports that she does not use drugs. Family History:  family history includes Arthritis in her sister; Cancer in her mother; Heart Disease in her maternal grandmother and paternal grandmother. Home Medications:  Prior to Visit Medications    Medication Sig Taking?  Authorizing Provider   folic acid (FOLVITE) 1 MG tablet TAKE ONE TABLET BY MOUTH DAILY Yes Trell Cisneros MD   meloxicam (MOBIC) 15 MG tablet Magdalene Shorten Yes Trell Cisneros MD glimepiride (AMARYL) 4 MG tablet Take 4 mg by mouth every morning (before breakfast) Yes Historical Provider, MD   furosemide (LASIX) 40 MG tablet TAKE ONE TABLET BY MOUTH DAILY AS NEEDED Yes Syed Cevallos MD   rosuvastatin (CRESTOR) 5 MG tablet Take 1 tablet by mouth daily Yes Syed Cevallos MD   losartan (COZAAR) 50 MG tablet TAKE 1 TABLET BY MOUTH TWO  TIMES DAILY Yes Syed Cevallos MD   carvedilol (COREG) 25 MG tablet Kaci Van Yes Syed Cevallos MD   venlafaxine (EFFEXOR XR) 75 MG extended release capsule Take 75 mg by mouth daily Yes Historical Provider, MD   pregabalin (LYRICA) 75 MG capsule Take 75 mg by mouth 2 times daily. Yes Historical Provider, MD   omeprazole (PRILOSEC) 20 MG delayed release capsule Take 1 capsule by mouth daily Yes BRIANNE King - CNP   acetaminophen (TYLENOL) 500 MG tablet Take 500 mg by mouth every 6 hours as needed for Pain Yes Historical Provider, MD   umeclidinium-vilanterol (ANORO ELLIPTA) 62.5-25 MCG/INH AEPB inhaler Inhale 1 puff into the lungs daily Yes Facundo Urbina MD   aspirin 81 MG tablet Take 81 mg by mouth every other day  Yes Historical Provider, MD   HYDROcodone-acetaminophen (NORCO) 5-325 MG per tablet Take 1 tablet by mouth every 6 hours as needed for Pain. Yes Historical Provider, MD   diclofenac sodium 1 % GEL Apply 4 g topically 4 times daily. Yes Berenice Madrigal MD   Insulin Glargine (LANTUS SC) Inject 30 Units into the skin 2 times daily (before meals)  Yes Historical Provider, MD   Insulin Aspart (NOVOLOG FLEXPEN SC) Inject 10 Units into the skin as needed 1-2 daily prn per pt Yes Historical Provider, MD   levothyroxine (SYNTHROID) 100 MCG tablet Take 75 mcg by mouth daily  Yes Historical Provider, MD   calcium carbonate (OSCAL) 500 MG TABS tablet Take 500 mg by mouth daily. Yes Historical Provider, MD   perphenazine 2 MG tablet Take 2 mg by mouth nightly.  Yes Historical Provider, MD Allergies:  No known allergies and Statins     [x] Medications and dosages reviewed. ROS:  [x]Full ROS obtained and negative except as mentioned in HPI      Physical Examination:    Vitals:    04/09/21 1312   BP: 122/68   Pulse: 74   Resp: 16   SpO2: 99%        · GENERAL: Well developed, well nourished, No acute distress in wheelchair   · NEUROLOGICAL: Alert and oriented, no motor abnormalities  · PSYCH: Calm affect  · SKIN: Warm and dry, No rash or bruising  · HEENT: Normocephalic, Sclera non-icteric, mucus membranes moist  · NECK: supple, JVP normal  · CAROTID: Normal upstroke, no bruits  · CARDIAC: Normal PMI, regular rate and rhythm, normal S1S2, no murmur, rub, or gallop  · RESPIRATORY: Limited air movement, Course BS bilaterally  · EXTREMITIES: no LE edema  · MUSCULOSKELETAL: No joint swelling or tenderness, no chest wall tenderness  · GASTROINTESTINAL: normal bowel sounds, soft, non-tender, no bruit    Assessment:     1. CAD (coronary artery disease):   LAD stent 2/2020. Stable  Stop plavix. Continue ASA  Continue medical management  Cath 2001> Minimal LAD (30% first diagonal), medical management. 2. HTN (hypertension)   Well controlled. Continue current medical therapy  /68 (Site: Left Upper Arm, Position: Sitting, Cuff Size: Medium Adult)   Pulse 74   Resp 16   Ht 5' 4\" (1.626 m)   Wt 185 lb 6.4 oz (84.1 kg)   SpO2 99%   BMI 31.82 kg/m²      3. Cardiomyopathy:   Stable. ECHO  (1/2020)45-50%, Repeat ECHO due to FORDE  Myoview 2/2020-61%  Continue coreg and losartan   ECHO 2009> Trace MR and ND  EF 50%  ECHO  8/14/15> EF 45-50%   4. SOB:  Stable, seeing Pulmonary. PFTs with severe lung disease. Repeat ECHO  Severe copd by pft's -- Follows with Dr. Chari Lizarraga. Uses inhalers which has helped her breathing. ECHO 8/14/15> Mild global hk, ef 45-50%  Chest x-ray 1/15> Normal  CT chest 12/14> Opacities   5. Rheumatoid arthritis: On methotrexate, stable  6. Hyperlipemia: On crestor 5. LDL=49.  Well

## 2021-04-10 LAB
ESTIMATED AVERAGE GLUCOSE: 188.6 MG/DL
HBA1C MFR BLD: 8.2 %

## 2021-04-12 ENCOUNTER — TELEPHONE (OUTPATIENT)
Dept: CARDIOLOGY CLINIC | Age: 76
End: 2021-04-12

## 2021-04-12 NOTE — TELEPHONE ENCOUNTER
Spoke with patient regarding lab results and instructions per [facility]K. Reviewed high potassium foods to limit intake of.       ----- Message from Maricruz Kelly MD sent at 4/12/2021  2:47 PM EDT -----  AIc high. Discuss with PCP.  K=5.1. Limit potassium. Sodium sightly better.     1 [facility]

## 2021-05-03 ENCOUNTER — PROCEDURE VISIT (OUTPATIENT)
Dept: CARDIOLOGY CLINIC | Age: 76
End: 2021-05-03
Payer: MEDICARE

## 2021-05-03 ENCOUNTER — OFFICE VISIT (OUTPATIENT)
Dept: CARDIOLOGY CLINIC | Age: 76
End: 2021-05-03
Payer: MEDICARE

## 2021-05-03 VITALS
WEIGHT: 189 LBS | BODY MASS INDEX: 32.27 KG/M2 | HEART RATE: 73 BPM | SYSTOLIC BLOOD PRESSURE: 118 MMHG | OXYGEN SATURATION: 95 % | DIASTOLIC BLOOD PRESSURE: 58 MMHG | HEIGHT: 64 IN

## 2021-05-03 DIAGNOSIS — I10 ESSENTIAL HYPERTENSION: Chronic | ICD-10-CM

## 2021-05-03 DIAGNOSIS — R06.02 SOB (SHORTNESS OF BREATH): ICD-10-CM

## 2021-05-03 DIAGNOSIS — I25.10 CORONARY ARTERY DISEASE INVOLVING NATIVE CORONARY ARTERY OF NATIVE HEART WITHOUT ANGINA PECTORIS: ICD-10-CM

## 2021-05-03 DIAGNOSIS — I42.9 CARDIOMYOPATHY, UNSPECIFIED TYPE (HCC): Chronic | ICD-10-CM

## 2021-05-03 DIAGNOSIS — E78.2 MIXED HYPERLIPIDEMIA: Chronic | ICD-10-CM

## 2021-05-03 DIAGNOSIS — I25.5 ISCHEMIC CARDIOMYOPATHY: Chronic | ICD-10-CM

## 2021-05-03 DIAGNOSIS — I42.9 CARDIOMYOPATHY, UNSPECIFIED TYPE (HCC): ICD-10-CM

## 2021-05-03 DIAGNOSIS — I25.10 CORONARY ARTERY DISEASE INVOLVING NATIVE CORONARY ARTERY OF NATIVE HEART WITHOUT ANGINA PECTORIS: Primary | Chronic | ICD-10-CM

## 2021-05-03 LAB
LV EF: 45 %
LVEF MODALITY: NORMAL

## 2021-05-03 PROCEDURE — 1090F PRES/ABSN URINE INCON ASSESS: CPT | Performed by: INTERNAL MEDICINE

## 2021-05-03 PROCEDURE — G8399 PT W/DXA RESULTS DOCUMENT: HCPCS | Performed by: INTERNAL MEDICINE

## 2021-05-03 PROCEDURE — 1123F ACP DISCUSS/DSCN MKR DOCD: CPT | Performed by: INTERNAL MEDICINE

## 2021-05-03 PROCEDURE — 1036F TOBACCO NON-USER: CPT | Performed by: INTERNAL MEDICINE

## 2021-05-03 PROCEDURE — 4040F PNEUMOC VAC/ADMIN/RCVD: CPT | Performed by: INTERNAL MEDICINE

## 2021-05-03 PROCEDURE — G8417 CALC BMI ABV UP PARAM F/U: HCPCS | Performed by: INTERNAL MEDICINE

## 2021-05-03 PROCEDURE — G8427 DOCREV CUR MEDS BY ELIG CLIN: HCPCS | Performed by: INTERNAL MEDICINE

## 2021-05-03 PROCEDURE — 93306 TTE W/DOPPLER COMPLETE: CPT | Performed by: INTERNAL MEDICINE

## 2021-05-03 PROCEDURE — 3017F COLORECTAL CA SCREEN DOC REV: CPT | Performed by: INTERNAL MEDICINE

## 2021-05-03 PROCEDURE — 99214 OFFICE O/P EST MOD 30 MIN: CPT | Performed by: INTERNAL MEDICINE

## 2021-05-03 RX ORDER — FUROSEMIDE 40 MG/1
TABLET ORAL
Qty: 90 TABLET | Refills: 1 | Status: SHIPPED | OUTPATIENT
Start: 2021-05-03 | End: 2021-11-03 | Stop reason: SDUPTHER

## 2021-05-03 RX ORDER — LOSARTAN POTASSIUM 50 MG/1
TABLET ORAL
Qty: 180 TABLET | Refills: 4 | Status: SHIPPED | OUTPATIENT
Start: 2021-05-03 | End: 2021-06-25

## 2021-05-03 RX ORDER — ROSUVASTATIN CALCIUM 5 MG/1
5 TABLET, COATED ORAL DAILY
Qty: 90 TABLET | Refills: 4 | Status: SHIPPED | OUTPATIENT
Start: 2021-05-03 | End: 2021-10-13

## 2021-05-03 RX ORDER — CARVEDILOL 25 MG/1
TABLET ORAL
Qty: 90 TABLET | Refills: 4 | Status: SHIPPED | OUTPATIENT
Start: 2021-05-03 | End: 2021-06-25

## 2021-05-03 NOTE — PROGRESS NOTES
[facility]       Referring Provider:  Yesenia Arroyo MD     Chief Complaint   Patient presents with    Follow-up     no cardiac complaints at this time       History of Present Illness:  Mrs. Saray Teran is here today for follow up of her CAD, HTN, HLD, CM. She is past smoker. She  sees Dr. Shani Matias for COPD. She was seen 1/2020 with issues with dyspnea on exertion. Stress myoview obtained showing inferior-lateral ischemia. Cath with 90% LAD stenosis. PCI with Dr. Pati Garcia. Aldactone stopped 7/2020 due to elevated potassium. SHe is seen today with an ECHO to evaluate LV due to dyspnea. She has limited exercise tolerance as she has severe back pain, followed by dyspnea that makes her sit. ECHO today with EF=45%. Past Medical History:   has a past medical history of CAD (coronary artery disease), Cancer ([facility] [address] Cardiomyopathy ([facility] [address] CHF (congestive heart failure) ([facility] [address] Hyperlipidemia, Hypertension, Hypothyroidism, and Psoriasis. Surgical History:   has a past surgical history that includes bladder repair; Hysterectomy; malignant skin lesion excision; and shoulder surgery (03/2013). Social History:   reports that she quit smoking about 29 years ago. Her smoking use included cigarettes. She has a 17.50 pack-year smoking history. She has never used smokeless tobacco. She reports current alcohol use of about 1.0 - 2.0 standard drinks of alcohol per week. She reports that she does not use drugs. Family History:  family history includes Arthritis in her sister; Cancer in her mother; Heart Disease in her maternal grandmother and paternal grandmother. Home Medications:  Prior to Visit Medications    Medication Sig Taking?  Authorizing Provider   folic acid (FOLVITE) 1 MG tablet TAKE ONE TABLET BY MOUTH DAILY Yes Sabrina Rea MD   meloxicam (MOBIC) 15 MG tablet TAKE ONE TABLET BY MOUTH DAILY Yes Sabrina Rea MD   glimepiride (AMARYL) 4 MG tablet Take 4 mg by mouth every morning (before breakfast) Yes Historical Provider, MD   furosemide (LASIX) 40 MG tablet TAKE ONE TABLET BY MOUTH DAILY AS NEEDED Yes Dione Cole MD   rosuvastatin (CRESTOR) 5 MG tablet Take 1 tablet by mouth daily Yes Dione Cole MD   losartan (COZAAR) 50 MG tablet TAKE 1 TABLET BY MOUTH TWO  TIMES DAILY Yes Dione Cole MD   carvedilol (COREG) 25 MG tablet Veleta Burner Yes Dione Cole MD   venlafaxine (EFFEXOR XR) 75 MG extended release capsule Take 75 mg by mouth daily Yes Historical Provider, MD   pregabalin (LYRICA) 75 MG capsule Take 75 mg by mouth 2 times daily. Yes Historical Provider, MD   omeprazole (PRILOSEC) 20 MG delayed release capsule Take 1 capsule by mouth daily Yes BRIANNE Romero - CNP   acetaminophen (TYLENOL) 500 MG tablet Take 500 mg by mouth every 6 hours as needed for Pain Yes Historical Provider, MD   umeclidinium-vilanterol (ANORO ELLIPTA) 62.5-25 MCG/INH AEPB inhaler Inhale 1 puff into the lungs daily Yes Darryle Innocent, MD   aspirin 81 MG tablet Take 81 mg by mouth every other day  Yes Historical Provider, MD   HYDROcodone-acetaminophen (NORCO) 5-325 MG per tablet Take 1 tablet by mouth every 6 hours as needed for Pain. Yes Historical Provider, MD   diclofenac sodium 1 % GEL Apply 4 g topically 4 times daily. Yes Riki Pabon MD   Insulin Glargine (LANTUS SC) Inject 30 Units into the skin 2 times daily (before meals)  Yes Historical Provider, MD   Insulin Aspart (NOVOLOG FLEXPEN SC) Inject 10 Units into the skin as needed 1-2 daily prn per pt Yes Historical Provider, MD   levothyroxine (SYNTHROID) 100 MCG tablet Take 75 mcg by mouth daily  Yes Historical Provider, MD   calcium carbonate (OSCAL) 500 MG TABS tablet Take 500 mg by mouth daily. Yes Historical Provider, MD   perphenazine 2 MG tablet Take 2 mg by mouth nightly.  Yes Historical Provider, MD       Allergies:  No known allergies and Statins     [x] Medications and dosages reviewed. ROS:  [x]Full ROS obtained and negative except as mentioned in HPI      Physical Examination:    Vitals:    05/03/21 1338   BP: (!) 118/58   Pulse: 73   SpO2: 95%        · GENERAL: Well developed, well nourished, No acute distress in wheelchair   · NEUROLOGICAL: Alert and oriented, no motor abnormalities  · PSYCH: Calm affect  · SKIN: Warm and dry, No rash or bruising  · HEENT: Normocephalic, Sclera non-icteric, mucus membranes moist  · NECK: supple, JVP normal  · CAROTID: Normal upstroke, no bruits  · CARDIAC: Normal PMI, regular rate and rhythm, normal S1S2, no murmur, rub, or gallop  · RESPIRATORY: Limited air movement, Course BS bilaterally  · EXTREMITIES: no LE edema  · MUSCULOSKELETAL: No joint swelling or tenderness, no chest wall tenderness  · GASTROINTESTINAL: normal bowel sounds, soft, non-tender, no bruit      ECHO 5/3/2021   Summary   Normal left ventricular size and wall thickness. Mildly decreased left   ventricular systolic function with inferolateral wall hypokinesis. EStimated   EF 45%. E/e'=9.   Trivial mitral regurgitation. Trivial aortic regurgitation is present. Pressure half-time is calculated at   524 msec. IVC size is normal (<2.1cm) and collapses > 50% with respiration consistent   with normal RA pressure (3mmHg). Assessment:     1. CAD (coronary artery disease):   LAD stent 2/2020. Stable. .Continue ASA  Continue medical management  Cath 2001> Minimal LAD (30% first diagonal), medical management. 2. HTN (hypertension)   Well controlled. Continue current medical therapy  BP (!) 118/58   Pulse 73   Ht 5' 4\" (1.626 m)   Wt 189 lb (85.7 kg)   SpO2 95%   BMI 32.44 kg/m²      3. Cardiomyopathy:   Stable. ECHO  (1/2020)45-50%, Repeat ECHO today unchanged  Myoview 2/2020-61%  Continue coreg and losartan   ECHO 2009> Trace MR and MT  EF 50%  ECHO  8/14/15> EF 45-50%   4. SOB:  Stable, seeing Pulmonary. PFTs with severe lung disease. Repeat ECHO OK.  Doubt cardiac. Also exacerbated by back issues  Severe copd by pft's -- Follows with Dr. Zoe Perez. Uses inhalers which has helped her breathing. ECHO 8/14/15> Mild global hk, ef 45-50%  Chest x-ray 1/15> Normal  CT chest 12/14> Opacities   5. Rheumatoid arthritis: On methotrexate, stable  6. Hyperlipemia: On crestor 5. LDL=49. Well controlled  7. Hyperkalemia:K=5.1. Low K+ diet. Recheck 3 months. Sodium 130. Limit water. PLAN:  Stable cardiac status  Low K diet and limit free water.   Labs 3 months  F/u 6 months    Thank you for allowing me to participate in the care of this individual.      Elias Laura M.D., [facility]

## 2021-06-25 DIAGNOSIS — I25.10 CORONARY ARTERY DISEASE INVOLVING NATIVE CORONARY ARTERY OF NATIVE HEART WITHOUT ANGINA PECTORIS: Chronic | ICD-10-CM

## 2021-06-25 DIAGNOSIS — I10 ESSENTIAL HYPERTENSION: Chronic | ICD-10-CM

## 2021-06-25 DIAGNOSIS — I42.9 CARDIOMYOPATHY, UNSPECIFIED TYPE (HCC): ICD-10-CM

## 2021-06-25 RX ORDER — CARVEDILOL 25 MG/1
TABLET ORAL
Qty: 90 TABLET | Refills: 3 | Status: SHIPPED | OUTPATIENT
Start: 2021-06-25

## 2021-06-25 RX ORDER — LOSARTAN POTASSIUM 50 MG/1
TABLET ORAL
Qty: 180 TABLET | Refills: 3 | Status: SHIPPED | OUTPATIENT
Start: 2021-06-25

## 2021-06-25 NOTE — TELEPHONE ENCOUNTER
Lov 5/3/2021  Lrf 5/3/2021   Appt 11/3/2021 please advise for this medication should have gone to mail order.

## 2021-06-30 ENCOUNTER — OFFICE VISIT (OUTPATIENT)
Dept: RHEUMATOLOGY | Age: 76
End: 2021-06-30
Payer: MEDICARE

## 2021-06-30 VITALS
BODY MASS INDEX: 31.24 KG/M2 | HEART RATE: 76 BPM | HEIGHT: 64 IN | DIASTOLIC BLOOD PRESSURE: 74 MMHG | SYSTOLIC BLOOD PRESSURE: 132 MMHG | WEIGHT: 183 LBS

## 2021-06-30 DIAGNOSIS — Z79.899 ENCOUNTER FOR LONG-TERM (CURRENT) USE OF HIGH-RISK MEDICATION: ICD-10-CM

## 2021-06-30 DIAGNOSIS — L40.50 PSORIATIC ARTHRITIS (HCC): Primary | ICD-10-CM

## 2021-06-30 DIAGNOSIS — Z51.81 ENCOUNTER FOR THERAPEUTIC DRUG MONITORING: ICD-10-CM

## 2021-06-30 PROCEDURE — G8427 DOCREV CUR MEDS BY ELIG CLIN: HCPCS | Performed by: INTERNAL MEDICINE

## 2021-06-30 PROCEDURE — 1036F TOBACCO NON-USER: CPT | Performed by: INTERNAL MEDICINE

## 2021-06-30 PROCEDURE — 4040F PNEUMOC VAC/ADMIN/RCVD: CPT | Performed by: INTERNAL MEDICINE

## 2021-06-30 PROCEDURE — G8399 PT W/DXA RESULTS DOCUMENT: HCPCS | Performed by: INTERNAL MEDICINE

## 2021-06-30 PROCEDURE — 1090F PRES/ABSN URINE INCON ASSESS: CPT | Performed by: INTERNAL MEDICINE

## 2021-06-30 PROCEDURE — 99213 OFFICE O/P EST LOW 20 MIN: CPT | Performed by: INTERNAL MEDICINE

## 2021-06-30 PROCEDURE — G8417 CALC BMI ABV UP PARAM F/U: HCPCS | Performed by: INTERNAL MEDICINE

## 2021-06-30 PROCEDURE — 1123F ACP DISCUSS/DSCN MKR DOCD: CPT | Performed by: INTERNAL MEDICINE

## 2021-06-30 PROCEDURE — 3017F COLORECTAL CA SCREEN DOC REV: CPT | Performed by: INTERNAL MEDICINE

## 2021-06-30 RX ORDER — MELOXICAM 15 MG/1
TABLET ORAL
Qty: 90 TABLET | Refills: 1 | Status: SHIPPED | OUTPATIENT
Start: 2021-06-30 | End: 2021-11-10 | Stop reason: SDUPTHER

## 2021-06-30 NOTE — PROGRESS NOTES
Subjective:       Gaby Mendez is a 76 y.o. female   Patient returns for follow-up of psoriatic arthritis. She is on meloxicam 15 mg a day. She is complaining of aching all over. Review of Systems:    Denies symptoms of giant cell arteritis. Complains of pain in the left thumb    Objective:       /74   Pulse 76   Ht 5' 4\" (1.626 m)   Wt 183 lb (83 kg)   BMI 31.41 kg/m²   Alert female no acute distress lungs clear to auscultation. Cardiovascular exam normal sinus rhythm musculoskeletal exam reveals minimal swelling at the first IP left hand. No other synovitis just osteoarthritic changes skin occasional psoriatic lesion. Temporal arteries 1+ without nodularity or tenderness  Assessment:      Psoriatic arthritis. Diffuse myalgias    Plan:      Blood work will be obtained to monitor for adverse drug reactions from the nonsteroidal.  She can supplement with Tylenol up to 3000 mg daily. A CRP will be checked looking for evidence of polymyalgia. I will see her back in 5 months time.         Luz Contreras MD, MD, 6/30/2021 1:25 PM

## 2021-07-01 ENCOUNTER — TELEPHONE (OUTPATIENT)
Dept: RHEUMATOLOGY | Age: 76
End: 2021-07-01

## 2021-07-01 RX ORDER — PREDNISONE 1 MG/1
10 TABLET ORAL DAILY
Qty: 60 TABLET | Refills: 1 | Status: SHIPPED | OUTPATIENT
Start: 2021-07-01 | End: 2021-07-31

## 2021-07-01 NOTE — TELEPHONE ENCOUNTER
Dr Juan Bravo reviewed labs with pt. Will start prednisone 10 mg daily and will call office in 1 week.

## 2021-07-03 DIAGNOSIS — L40.50 PSORIATIC ARTHRITIS (HCC): ICD-10-CM

## 2021-07-03 DIAGNOSIS — Z79.899 ENCOUNTER FOR LONG-TERM (CURRENT) USE OF HIGH-RISK MEDICATION: ICD-10-CM

## 2021-07-06 RX ORDER — FOLIC ACID 1 MG/1
TABLET ORAL
Qty: 90 TABLET | Refills: 0 | Status: SHIPPED | OUTPATIENT
Start: 2021-07-06 | End: 2021-07-21 | Stop reason: SDUPTHER

## 2021-07-07 ENCOUNTER — TELEPHONE (OUTPATIENT)
Dept: INTERNAL MEDICINE CLINIC | Age: 76
End: 2021-07-07

## 2021-07-07 NOTE — TELEPHONE ENCOUNTER
Spoke with the patient she is feeling much better since beginning prednisone 10 mg a day. She appears to have polymyalgia rheumatica. An appointment has been made for August 18-12 45 for follow-up.

## 2021-07-07 NOTE — TELEPHONE ENCOUNTER
Pt is calling to talk to Dr Clarissa Martines about the prednisone. She say it is working. Please call pt back.

## 2021-07-19 DIAGNOSIS — Z79.899 ENCOUNTER FOR LONG-TERM (CURRENT) USE OF HIGH-RISK MEDICATION: ICD-10-CM

## 2021-07-19 DIAGNOSIS — L40.50 PSORIATIC ARTHRITIS (HCC): ICD-10-CM

## 2021-07-20 RX ORDER — FOLIC ACID 1 MG/1
TABLET ORAL
Qty: 90 TABLET | Refills: 0 | OUTPATIENT
Start: 2021-07-20

## 2021-07-21 DIAGNOSIS — Z79.899 ENCOUNTER FOR LONG-TERM (CURRENT) USE OF HIGH-RISK MEDICATION: ICD-10-CM

## 2021-07-21 DIAGNOSIS — L40.50 PSORIATIC ARTHRITIS (HCC): ICD-10-CM

## 2021-07-21 RX ORDER — FOLIC ACID 1 MG/1
TABLET ORAL
Qty: 90 TABLET | Refills: 0 | Status: SHIPPED | OUTPATIENT
Start: 2021-07-21 | End: 2022-06-06 | Stop reason: ALTCHOICE

## 2021-08-17 ENCOUNTER — TELEPHONE (OUTPATIENT)
Dept: CARDIOLOGY CLINIC | Age: 76
End: 2021-08-17

## 2021-08-17 NOTE — TELEPHONE ENCOUNTER
Discussed with patient. She has been on prednisone for Dr Shari Marshall but she will talk with Dr Shari Marshall and Dr Nicolle Ramesh.      ----- Message from Dona Washington MD sent at 8/17/2021 12:35 PM EDT -----  Potassium fine, but HgBA1c high. Needs to discuss with PCP.     1 [facility]

## 2021-08-18 ENCOUNTER — OFFICE VISIT (OUTPATIENT)
Dept: RHEUMATOLOGY | Age: 76
End: 2021-08-18
Payer: MEDICARE

## 2021-08-18 VITALS
WEIGHT: 179.38 LBS | BODY MASS INDEX: 30.63 KG/M2 | HEART RATE: 76 BPM | DIASTOLIC BLOOD PRESSURE: 74 MMHG | HEIGHT: 64 IN | SYSTOLIC BLOOD PRESSURE: 132 MMHG

## 2021-08-18 DIAGNOSIS — M35.3 PMR (POLYMYALGIA RHEUMATICA) (HCC): Primary | ICD-10-CM

## 2021-08-18 PROCEDURE — 1090F PRES/ABSN URINE INCON ASSESS: CPT | Performed by: INTERNAL MEDICINE

## 2021-08-18 PROCEDURE — 99213 OFFICE O/P EST LOW 20 MIN: CPT | Performed by: INTERNAL MEDICINE

## 2021-08-18 PROCEDURE — G8417 CALC BMI ABV UP PARAM F/U: HCPCS | Performed by: INTERNAL MEDICINE

## 2021-08-18 PROCEDURE — 1036F TOBACCO NON-USER: CPT | Performed by: INTERNAL MEDICINE

## 2021-08-18 PROCEDURE — 1123F ACP DISCUSS/DSCN MKR DOCD: CPT | Performed by: INTERNAL MEDICINE

## 2021-08-18 PROCEDURE — 4040F PNEUMOC VAC/ADMIN/RCVD: CPT | Performed by: INTERNAL MEDICINE

## 2021-08-18 PROCEDURE — G8427 DOCREV CUR MEDS BY ELIG CLIN: HCPCS | Performed by: INTERNAL MEDICINE

## 2021-08-18 PROCEDURE — G8399 PT W/DXA RESULTS DOCUMENT: HCPCS | Performed by: INTERNAL MEDICINE

## 2021-08-18 RX ORDER — PREDNISONE 1 MG/1
10 TABLET ORAL DAILY
COMMUNITY
End: 2021-09-07

## 2021-08-18 NOTE — PROGRESS NOTES
Subjective:       Blanca Varma is a 68 y.o. female the patient returns for follow-up of polymyalgia rheumatica. She is much better on prednisone 10 mg a day. She can do her ADLs without difficulty      Review of Systems:    Denies symptoms of giant cell arteritis    Objective:     /74   Pulse 76   Ht 5' 4\" (1.626 m)   Wt 179 lb 6 oz (81.4 kg)   BMI 30.79 kg/m²   Alert female no acute distress. Temporal arteries 1+ without nodularity or tenderness    Assessment:    Polymyalgia rheumatica      Plan:    Blood work will be obtained today to  activity of disease. If her CRP is normal prednisone will be reduced. I will see the patient back in 6 weeks time.           Brian Ritter MD, MD, 8/18/2021 1:09 PM

## 2021-08-19 RX ORDER — PREDNISONE 1 MG/1
TABLET ORAL
Qty: 120 TABLET | Refills: 2 | Status: SHIPPED | OUTPATIENT
Start: 2021-08-19 | End: 2021-12-07

## 2021-09-07 RX ORDER — PREDNISONE 1 MG/1
TABLET ORAL
Qty: 30 TABLET | Refills: 2 | Status: SHIPPED | OUTPATIENT
Start: 2021-09-07 | End: 2021-11-24

## 2021-09-29 ENCOUNTER — OFFICE VISIT (OUTPATIENT)
Dept: RHEUMATOLOGY | Age: 76
End: 2021-09-29
Payer: MEDICARE

## 2021-09-29 VITALS
SYSTOLIC BLOOD PRESSURE: 130 MMHG | BODY MASS INDEX: 30.9 KG/M2 | WEIGHT: 181 LBS | HEIGHT: 64 IN | HEART RATE: 80 BPM | DIASTOLIC BLOOD PRESSURE: 72 MMHG

## 2021-09-29 DIAGNOSIS — M35.3 PMR (POLYMYALGIA RHEUMATICA) (HCC): Primary | ICD-10-CM

## 2021-09-29 PROCEDURE — 4040F PNEUMOC VAC/ADMIN/RCVD: CPT | Performed by: INTERNAL MEDICINE

## 2021-09-29 PROCEDURE — 1036F TOBACCO NON-USER: CPT | Performed by: INTERNAL MEDICINE

## 2021-09-29 PROCEDURE — 1090F PRES/ABSN URINE INCON ASSESS: CPT | Performed by: INTERNAL MEDICINE

## 2021-09-29 PROCEDURE — 99213 OFFICE O/P EST LOW 20 MIN: CPT | Performed by: INTERNAL MEDICINE

## 2021-09-29 PROCEDURE — G8417 CALC BMI ABV UP PARAM F/U: HCPCS | Performed by: INTERNAL MEDICINE

## 2021-09-29 PROCEDURE — G8399 PT W/DXA RESULTS DOCUMENT: HCPCS | Performed by: INTERNAL MEDICINE

## 2021-09-29 PROCEDURE — 1123F ACP DISCUSS/DSCN MKR DOCD: CPT | Performed by: INTERNAL MEDICINE

## 2021-09-29 PROCEDURE — G8427 DOCREV CUR MEDS BY ELIG CLIN: HCPCS | Performed by: INTERNAL MEDICINE

## 2021-09-29 NOTE — PROGRESS NOTES
Subjective:       Maia Sharp is a 68 y.o. female the patient returns for follow-up of polymyalgia rheumatica. She had no difficulty decreasing her prednisone to 9 mg a day. Overall she feels well her last CRP was 7.1      Review of Systems:    Denies symptoms of giant cell arteritis. Denies symptoms of polymyalgia rheumatica    Objective:       /72   Pulse 80   Ht 5' 4\" (1.626 m)   Wt 181 lb (82.1 kg)   BMI 31.07 kg/m²   Alert female no acute distress    temporal arteries 1+ without nodularity or tenderness. No peripheral synovitis  Assessment:        Polymyalgia rheumatica  Plan:    A CRP will be checked. The patient will decrease prednisone to 8 mg a day. I will see her back in 6 weeks time.           Severiano Cuba, MD, MD, 9/29/2021 12:38 PM

## 2021-10-13 RX ORDER — ROSUVASTATIN CALCIUM 5 MG/1
5 TABLET, COATED ORAL DAILY
Qty: 90 TABLET | Refills: 3 | Status: SHIPPED | OUTPATIENT
Start: 2021-10-13

## 2021-10-13 NOTE — TELEPHONE ENCOUNTER
Received refill request for rosuvastatin from [facility].     Last ov:5/3/2021 ANDRES    Last labs: bmp, enzymes, lipid 1/21/2021 in care everywhere    Last Refill: 5/3/2021 #90 with 4 refills to [facility]    Next appointment:11/3/2021 ANDRES

## 2021-10-28 ENCOUNTER — OFFICE VISIT (OUTPATIENT)
Dept: PULMONOLOGY | Age: 76
End: 2021-10-28
Payer: MEDICARE

## 2021-10-28 VITALS — HEART RATE: 71 BPM | OXYGEN SATURATION: 94 %

## 2021-10-28 DIAGNOSIS — J44.9 COPD, SEVERE (HCC): Primary | ICD-10-CM

## 2021-10-28 DIAGNOSIS — K21.9 GERD WITHOUT ESOPHAGITIS: ICD-10-CM

## 2021-10-28 DIAGNOSIS — R91.1 PULMONARY NODULE: ICD-10-CM

## 2021-10-28 PROCEDURE — G8427 DOCREV CUR MEDS BY ELIG CLIN: HCPCS | Performed by: NURSE PRACTITIONER

## 2021-10-28 PROCEDURE — G8484 FLU IMMUNIZE NO ADMIN: HCPCS | Performed by: NURSE PRACTITIONER

## 2021-10-28 PROCEDURE — 4040F PNEUMOC VAC/ADMIN/RCVD: CPT | Performed by: NURSE PRACTITIONER

## 2021-10-28 PROCEDURE — G8399 PT W/DXA RESULTS DOCUMENT: HCPCS | Performed by: NURSE PRACTITIONER

## 2021-10-28 PROCEDURE — 1090F PRES/ABSN URINE INCON ASSESS: CPT | Performed by: NURSE PRACTITIONER

## 2021-10-28 PROCEDURE — G8417 CALC BMI ABV UP PARAM F/U: HCPCS | Performed by: NURSE PRACTITIONER

## 2021-10-28 PROCEDURE — G8926 SPIRO NO PERF OR DOC: HCPCS | Performed by: NURSE PRACTITIONER

## 2021-10-28 PROCEDURE — 1036F TOBACCO NON-USER: CPT | Performed by: NURSE PRACTITIONER

## 2021-10-28 PROCEDURE — 1123F ACP DISCUSS/DSCN MKR DOCD: CPT | Performed by: NURSE PRACTITIONER

## 2021-10-28 PROCEDURE — 3023F SPIROM DOC REV: CPT | Performed by: NURSE PRACTITIONER

## 2021-10-28 PROCEDURE — 99213 OFFICE O/P EST LOW 20 MIN: CPT | Performed by: NURSE PRACTITIONER

## 2021-10-28 ASSESSMENT — ENCOUNTER SYMPTOMS
COLOR CHANGE: 0
BACK PAIN: 1
ABDOMINAL PAIN: 0
WHEEZING: 0
COUGH: 0
CONSTIPATION: 0
SHORTNESS OF BREATH: 0

## 2021-10-28 NOTE — PROGRESS NOTES
[facility] Outpatient Follow Up Note    Subjective:   CHIEF COMPLAINT / HPI: SOB, severe COPD   The patient is 68 y.o. female who presents today for a routine follow up visit related to the above mentioned issues. There is a PMH significant for CAD, cardiomyopathy, psoriatic arthritis and stopped MTX and Taltz d/t recurrent infection and HTN. She was last evaluated by me in April. At time she reported baseline FORDE. Presently she reports her breathing is doing well. She denies cough. She is seeing Rheumatology and has been on Prednisone. This was recently dropped to 7mg daily. This helps her pain. She continues to struggle with back pain and feels like this contributes to her symptoms. Reflux is stable per report on Prilosec which helps. She has also been compliant with Anoro. She has not required supplemental O2. Past Medical History:   Diagnosis Date    CAD (coronary artery disease)     [facility] [address]     basal cell skin    Cardiomyopathy ([facility] [address]     CHF (congestive heart failure) ([facility] [address]     Hyperlipidemia     Hypertension     Hypothyroidism     Psoriasis      Social History:    Social History     Tobacco Use   Smoking Status Former Smoker    Packs/day: 0.50    Years: 35.00    Pack years: 17.50    Types: Cigarettes    Quit date: 1991    Years since quittin.3   Smokeless Tobacco Never Used   Tobacco Comment    started to smoke at 20 / smoked up to 0.5 ppd for 35 yrs off and  on /      Current Medications:     Current Outpatient Medications on File Prior to Visit   Medication Sig Dispense Refill    rosuvastatin (CRESTOR) 5 MG tablet TAKE 1 TABLET BY MOUTH  DAILY 90 tablet 3    predniSONE (DELTASONE) 5 MG tablet Take 1 tablet daily along with four 1 mg tablets for total of 9 mg daily.  30 tablet 2    predniSONE (DELTASONE) 1 MG tablet Take 4 tablets along with one 5 mg tablet daily for total of 9 mg total. 708 tablet 2    folic acid (FOLVITE) 1 MG tablet TAKE ONE TABLET BY MOUTH DAILY 90 tablet 0    ANORO ELLIPTA 62.5-25 MCG/INH AEPB inhaler USE 1 INHALATION BY MOUTH  DAILY 3 each 3    meloxicam (MOBIC) 15 MG tablet TAKE ONE TABLET BY MOUTH DAILY 90 tablet 1    carvedilol (COREG) 25 MG tablet TAKE ONE-HALF TABLET BY  MOUTH TWICE DAILY 90 tablet 3    losartan (COZAAR) 50 MG tablet TAKE 1 TABLET BY MOUTH  TWICE DAILY 180 tablet 3    furosemide (LASIX) 40 MG tablet TAKE ONE TABLET BY MOUTH DAILY AS NEEDED 90 tablet 1    glimepiride (AMARYL) 4 MG tablet Take 4 mg by mouth every morning (before breakfast)      venlafaxine (EFFEXOR XR) 75 MG extended release capsule Take 75 mg by mouth daily      pregabalin (LYRICA) 75 MG capsule Take 75 mg by mouth 2 times daily.  omeprazole (PRILOSEC) 20 MG delayed release capsule Take 1 capsule by mouth daily 30 capsule 3    acetaminophen (TYLENOL) 500 MG tablet Take 500 mg by mouth every 6 hours as needed for Pain      aspirin 81 MG tablet Take 81 mg by mouth every other day       HYDROcodone-acetaminophen (NORCO) 5-325 MG per tablet Take 1 tablet by mouth every 6 hours as needed for Pain.  diclofenac sodium 1 % GEL Apply 4 g topically 4 times daily. 3 Tube 3    Insulin Glargine (LANTUS SC) Inject 30 Units into the skin 2 times daily (before meals)       Insulin Aspart (NOVOLOG FLEXPEN SC) Inject 10 Units into the skin as needed 1-2 daily prn per pt      levothyroxine (SYNTHROID) 100 MCG tablet Take 75 mcg by mouth daily       calcium carbonate (OSCAL) 500 MG TABS tablet Take 500 mg by mouth daily.  perphenazine 2 MG tablet Take 2 mg by mouth nightly. No current facility-administered medications on file prior to visit. Review of Systems   Constitutional: Negative for chills and fever. HENT: Negative for congestion, ear pain and postnasal drip. Respiratory: Negative for cough, shortness of breath and wheezing. Cardiovascular: Negative for chest pain and leg swelling.    Gastrointestinal: Negative for abdominal pain and constipation. Musculoskeletal: Positive for back pain. Negative for arthralgias and joint swelling. Skin: Negative for color change and pallor. Allergic/Immunologic: Negative for environmental allergies and food allergies. Psychiatric/Behavioral: Negative for agitation and confusion. Objective:       VITALS:  Pulse 71   SpO2 94% Comment: RA at rest     Physical Exam  Vitals reviewed. Constitutional:       General: She is not in acute distress. Appearance: She is well-developed. She is not diaphoretic. HENT:      Head: Normocephalic and atraumatic. Eyes:      General:         Right eye: No discharge. Left eye: No discharge. Neck:      Trachea: No tracheal deviation. Cardiovascular:      Rate and Rhythm: Normal rate and regular rhythm. Pulmonary:      Effort: No respiratory distress. Breath sounds: No stridor. No wheezing, rhonchi or rales. Chest:      Chest wall: No tenderness. Abdominal:      General: There is no distension. Tenderness: There is no abdominal tenderness. Skin:     General: Skin is warm and dry. Findings: No rash. Neurological:      Mental Status: She is alert and oriented to person, place, and time. Psychiatric:         Behavior: Behavior normal.          DATA:      Radiology Review:  Pertinent images / reports were reviewed as a part of this visit. CT chest done June 2020:  1. No acute cardiopulmonary disease. 2. Stable tree-in-bud nodularity likely representing sequelae of prior infection/inflammation. 3. No new or dominant pulmonary nodule. 4. Severe atherosclerosis. CT chest done 9/18/19:  Scattered micro nodularity throughout the lungs bilaterally, right greater than left, similar compared to prior, likely postinflammatory-infectious. Last PFTs done 11/6/20:  Spirometry reveals decreased FVC at 1.78 liters, which is 63% predicted. FEV1 is decreased at 0.94 liters, which is 44% predicted. FEV1/FVC  ratio is decreased at 53%. Expiratory flow rates are decreased. There  is no postbronchodilator study. Lung volumes reveal normal total lung  capacity, vital capacity is reduced at 66% predicted, residual volume is  increased at 172% predicted, diffusion capacity is decreased at 48%  predicted. In comparison to a study from 03/2018, FVC has decreased by  15%, FEV1 has decreased by 10%, diffusion capacity has decreased by 17%. IMPRESSION:  Severe obstructive lung disease with air trapping. This is  worse in comparison to the previous study. Assessment / Plan:   1. COPD, severe ([address]  - Stable FORDE  - Remains on Prednisone for Rheum issues  - Continue Anoro and PRN ALMA  - Had flu vaccination this year  - Awaiting third dose of COVID vaccine     2. Pulmonary nodule  - Recommended f/u CT at last visit which was not complete  - Again asked her to move forward with this    3. GERD without esophagitis  - Prilosec helping quite a bit per her report  - Rx'd through primary care    Return in about 6 months (around 4/28/2022). RTC sooner if symptoms worsen.     Galen Esparza MSN APRN-ACNP CCRN

## 2021-11-03 ENCOUNTER — OFFICE VISIT (OUTPATIENT)
Dept: CARDIOLOGY CLINIC | Age: 76
End: 2021-11-03
Payer: MEDICARE

## 2021-11-03 VITALS
DIASTOLIC BLOOD PRESSURE: 80 MMHG | HEART RATE: 98 BPM | OXYGEN SATURATION: 98 % | BODY MASS INDEX: 31.65 KG/M2 | SYSTOLIC BLOOD PRESSURE: 132 MMHG | WEIGHT: 185.4 LBS | HEIGHT: 64 IN

## 2021-11-03 DIAGNOSIS — I10 ESSENTIAL HYPERTENSION: Chronic | ICD-10-CM

## 2021-11-03 DIAGNOSIS — R73.09 ELEVATED GLUCOSE: ICD-10-CM

## 2021-11-03 DIAGNOSIS — R06.02 SOB (SHORTNESS OF BREATH): ICD-10-CM

## 2021-11-03 DIAGNOSIS — I42.9 CARDIOMYOPATHY, UNSPECIFIED TYPE (HCC): Chronic | ICD-10-CM

## 2021-11-03 DIAGNOSIS — E78.2 MIXED HYPERLIPIDEMIA: Chronic | ICD-10-CM

## 2021-11-03 DIAGNOSIS — I25.10 CORONARY ARTERY DISEASE INVOLVING NATIVE CORONARY ARTERY OF NATIVE HEART WITHOUT ANGINA PECTORIS: Primary | Chronic | ICD-10-CM

## 2021-11-03 PROCEDURE — G8417 CALC BMI ABV UP PARAM F/U: HCPCS | Performed by: INTERNAL MEDICINE

## 2021-11-03 PROCEDURE — G8484 FLU IMMUNIZE NO ADMIN: HCPCS | Performed by: INTERNAL MEDICINE

## 2021-11-03 PROCEDURE — G8427 DOCREV CUR MEDS BY ELIG CLIN: HCPCS | Performed by: INTERNAL MEDICINE

## 2021-11-03 PROCEDURE — 1123F ACP DISCUSS/DSCN MKR DOCD: CPT | Performed by: INTERNAL MEDICINE

## 2021-11-03 PROCEDURE — 99214 OFFICE O/P EST MOD 30 MIN: CPT | Performed by: INTERNAL MEDICINE

## 2021-11-03 PROCEDURE — 1090F PRES/ABSN URINE INCON ASSESS: CPT | Performed by: INTERNAL MEDICINE

## 2021-11-03 PROCEDURE — G8399 PT W/DXA RESULTS DOCUMENT: HCPCS | Performed by: INTERNAL MEDICINE

## 2021-11-03 PROCEDURE — 1036F TOBACCO NON-USER: CPT | Performed by: INTERNAL MEDICINE

## 2021-11-03 PROCEDURE — 4040F PNEUMOC VAC/ADMIN/RCVD: CPT | Performed by: INTERNAL MEDICINE

## 2021-11-03 RX ORDER — FUROSEMIDE 40 MG/1
TABLET ORAL
Qty: 90 TABLET | Refills: 1 | Status: SHIPPED | OUTPATIENT
Start: 2021-11-03

## 2021-11-03 NOTE — PROGRESS NOTES
[facility]       Referring Provider:  Christina Barnes MD     Chief Complaint   Patient presents with    Hypertension     No complaints     Coronary Artery Disease    6 Month Follow-Up      History of Present Illness:  Mrs. Javon Sawyer is here today for follow up of her CAD, HTN, HLD, CM. She is past smoker. She  sees Dr. Gretel Hudson for COPD. She was seen 1/2020 with issues with dyspnea on exertion. Stress myoview obtained showing inferior-lateral ischemia. Cath with 90% LAD stenosis. PCI with Dr. Darry Frankel. Aldactone stopped 7/2020 due to elevated potassium. She is doing well. She is now on Prednisone for PMR. No chest pain. Stable dyspnea. Past Medical History:   has a past medical history of CAD (coronary artery disease), Cancer ([facility] [address] Cardiomyopathy ([facility] [address] [facility]est[facility]) [facility] Hyperlipidemia, Hypertension, Hypothyroidism, and Psoriasis. Surgical History:   has a past surgical history that includes bladder repair; Hysterectomy; malignant skin lesion excision; and shoulder surgery (03/2013). Social History:   reports that she quit smoking about 30 years ago. Her smoking use included cigarettes. She has a 17.50 pack-year smoking history. She has never used smokeless tobacco. She reports current alcohol use of about 1.0 - 2.0 standard drinks of alcohol per week. She reports that she does not use drugs. Family History:  family history includes Arthritis in her sister; Cancer in her mother; Heart Disease in her maternal grandmother and paternal grandmother. Home Medications:  Prior to Visit Medications    Medication Sig Taking? Authorizing Provider   rosuvastatin (CRESTOR) 5 MG tablet TAKE 1 TABLET BY MOUTH  DAILY Yes Jane Leyva MD   predniSONE (DELTASONE) 5 MG tablet Take 1 tablet daily along with four 1 mg tablets for total of 9 mg daily.  Yes Pedro Pablo Baron MD   predniSONE (DELTASONE) 1 MG tablet Take 4 tablets along with one 5 mg tablet daily for total of 9 mg total. Yes Erin Turcios MD   folic acid (FOLVITE) 1 MG tablet TAKE ONE TABLET BY MOUTH DAILY Yes Erin Turcios MD   ANORO ELLIPTA 62.5-25 MCG/INH AEPB inhaler USE 1 INHALATION BY MOUTH  DAILY Yes BRIANNE Vides CNP   meloxicam (MOBIC) 15 MG tablet TAKE ONE TABLET BY MOUTH DAILY Yes Erin Turcios MD   carvedilol (COREG) 25 MG tablet TAKE ONE-HALF TABLET BY  MOUTH TWICE DAILY Yes Keara Cummings MD   losartan (COZAAR) 50 MG tablet TAKE 1 TABLET BY MOUTH  TWICE DAILY Yes Keara Cummings MD   furosemide (LASIX) 40 MG tablet TAKE ONE TABLET BY MOUTH DAILY AS NEEDED Yes Keara Cummings MD   glimepiride (AMARYL) 4 MG tablet Take 4 mg by mouth every morning (before breakfast) Yes Historical Provider, MD   venlafaxine (EFFEXOR XR) 75 MG extended release capsule Take 75 mg by mouth daily Yes Historical Provider, MD   omeprazole (PRILOSEC) 20 MG delayed release capsule Take 1 capsule by mouth daily Yes BRIANNE Vides - CNP   acetaminophen (TYLENOL) 500 MG tablet Take 500 mg by mouth every 6 hours as needed for Pain Yes Historical Provider, MD   aspirin 81 MG tablet Take 81 mg by mouth every other day  Yes Historical Provider, MD   HYDROcodone-acetaminophen (NORCO) 5-325 MG per tablet Take 1 tablet by mouth every 6 hours as needed for Pain. Yes Historical Provider, MD   diclofenac sodium 1 % GEL Apply 4 g topically 4 times daily. Yes Logan Quevedo MD   Insulin Glargine (LANTUS SC) Inject 30 Units into the skin 2 times daily (before meals)  Yes Historical Provider, MD   Insulin Aspart (NOVOLOG FLEXPEN SC) Inject 10 Units into the skin as needed 1-2 daily prn per pt Yes Historical Provider, MD   levothyroxine (SYNTHROID) 100 MCG tablet Take 75 mcg by mouth daily  Yes Historical Provider, MD   calcium carbonate (OSCAL) 500 MG TABS tablet Take 500 mg by mouth daily. Yes Historical Provider, MD   perphenazine 2 MG tablet Take 2 mg by mouth nightly.  Yes Historical Provider, MD       Allergies:  No known allergies and Statins     [x] Medications and dosages reviewed. ROS:  [x]Full ROS obtained and negative except as mentioned in HPI      Physical Examination:    Vitals:    11/03/21 1255   BP: 132/80   Pulse: 98   SpO2: 98%        · GENERAL: Well developed, well nourished, No acute distress in wheelchair   · NEUROLOGICAL: Alert and oriented, no motor abnormalities  · PSYCH: Calm affect  · SKIN: Warm and dry, No rash or bruising  · HEENT: Normocephalic, Sclera non-icteric, mucus membranes moist  · NECK: supple, JVP normal  · CAROTID: Normal upstroke, no bruits  · CARDIAC: Normal PMI, regular rate and rhythm, normal S1S2, no murmur, rub, or gallop  · RESPIRATORY: Limited air movement, Course BS bilaterally  · EXTREMITIES: No LE edema  · MUSCULOSKELETAL: No joint swelling or tenderness, no chest wall tenderness  · GASTROINTESTINAL: normal bowel sounds, soft, non-tender, no bruit      ECHO 5/3/2021   Summary   Normal left ventricular size and wall thickness. Mildly decreased left   ventricular systolic function with inferolateral wall hypokinesis. EStimated   EF 45%. E/e'=9.   Trivial mitral regurgitation. Trivial aortic regurgitation is present. Pressure half-time is calculated at   524 msec. IVC size is normal (<2.1cm) and collapses > 50% with respiration consistent   with normal RA pressure (3mmHg). Assessment:     1. CAD (coronary artery disease): Stable without angina. Medical therapy  LAD stent 2/2020. Erika Zhang Continue ASA  Continue medical management  Cath 2001> Minimal LAD (30% first diagonal), medical management. 2. HTN (hypertension)   Controlled. Continue current medical therapy  /80 (Site: Right Upper Arm, Position: Sitting, Cuff Size: Medium Adult)   Pulse 98   Ht 5' 4\" (1.626 m)   Wt 185 lb 6.4 oz (84.1 kg)   SpO2 98%   BMI 31.82 kg/m²      3. Cardiomyopathy:   Stable.   ECHO  (1/2020)45-50%, Repeat ECHO today unchanged  Myoview 2/2020-61%  Continue coreg and losartan   ECHO 2009> Trace MR and NH  EF 50%  ECHO  8/14/15> EF 45-50%   4. SOB:  Stable, seeing Pulmonary. PFTs with severe lung disease. Repeat ECHO 5/2021 OK. Doubt cardiac. Severe copd by pft's -- Follows with Dr. Jose Ramon Mak. Uses inhalers which has helped her breathing. Repeat Chest CT planned. ECHO 8/14/15> Mild global hk, ef 45-50%  Chest x-ray 1/15> Normal  CT chest 12/14> Opacities   5. Rheumatoid arthritis: On methotrexate, stable. On prednisone for PMR  6. Hyperlipemia: On crestor 5. LDL=49. Recheck  7. Hyperkalemia:K=5.1. Low K+ diet.  Recheck       PLAN:  Same meds  Repeat labs  F/u 6 months    Thank you for allowing me to participate in the care of this individual.      Alex Mac M.D., [facility]

## 2021-11-10 ENCOUNTER — OFFICE VISIT (OUTPATIENT)
Dept: RHEUMATOLOGY | Age: 76
End: 2021-11-10
Payer: MEDICARE

## 2021-11-10 VITALS
HEART RATE: 80 BPM | DIASTOLIC BLOOD PRESSURE: 72 MMHG | HEIGHT: 64 IN | SYSTOLIC BLOOD PRESSURE: 130 MMHG | BODY MASS INDEX: 31.58 KG/M2 | WEIGHT: 185 LBS

## 2021-11-10 DIAGNOSIS — M35.3 PMR (POLYMYALGIA RHEUMATICA) (HCC): Primary | ICD-10-CM

## 2021-11-10 DIAGNOSIS — M35.3 PMR (POLYMYALGIA RHEUMATICA) (HCC): ICD-10-CM

## 2021-11-10 DIAGNOSIS — Z51.81 ENCOUNTER FOR THERAPEUTIC DRUG MONITORING: ICD-10-CM

## 2021-11-10 DIAGNOSIS — Z79.899 ENCOUNTER FOR LONG-TERM (CURRENT) USE OF HIGH-RISK MEDICATION: ICD-10-CM

## 2021-11-10 DIAGNOSIS — L40.50 PSORIATIC ARTHRITIS (HCC): ICD-10-CM

## 2021-11-10 LAB — C-REACTIVE PROTEIN: 10.9 MG/L (ref 0–5.1)

## 2021-11-10 PROCEDURE — G8484 FLU IMMUNIZE NO ADMIN: HCPCS | Performed by: INTERNAL MEDICINE

## 2021-11-10 PROCEDURE — 1036F TOBACCO NON-USER: CPT | Performed by: INTERNAL MEDICINE

## 2021-11-10 PROCEDURE — 1123F ACP DISCUSS/DSCN MKR DOCD: CPT | Performed by: INTERNAL MEDICINE

## 2021-11-10 PROCEDURE — G8399 PT W/DXA RESULTS DOCUMENT: HCPCS | Performed by: INTERNAL MEDICINE

## 2021-11-10 PROCEDURE — 4040F PNEUMOC VAC/ADMIN/RCVD: CPT | Performed by: INTERNAL MEDICINE

## 2021-11-10 PROCEDURE — 99213 OFFICE O/P EST LOW 20 MIN: CPT | Performed by: INTERNAL MEDICINE

## 2021-11-10 PROCEDURE — 1090F PRES/ABSN URINE INCON ASSESS: CPT | Performed by: INTERNAL MEDICINE

## 2021-11-10 PROCEDURE — G8427 DOCREV CUR MEDS BY ELIG CLIN: HCPCS | Performed by: INTERNAL MEDICINE

## 2021-11-10 PROCEDURE — G8417 CALC BMI ABV UP PARAM F/U: HCPCS | Performed by: INTERNAL MEDICINE

## 2021-11-10 RX ORDER — MELOXICAM 15 MG/1
TABLET ORAL
Qty: 90 TABLET | Refills: 0 | Status: SHIPPED | OUTPATIENT
Start: 2021-11-10 | End: 2022-01-11

## 2021-11-10 NOTE — PROGRESS NOTES
Subjective:       Timothy Aleman is a 68 y.o. female the patient returns for follow-up of polymyalgia rheumatica. She had no difficulty decreasing her prednisone to 8 mg a day her last CRP was 9.2 modestly elevated. She also continues on meloxicam 15 mg daily. Review of Systems:      Denies symptoms of giant cell arteritis. Denies symptoms of polymyalgia rheumatica  Objective:       /72   Pulse 80   Ht 5' 4\" (1.626 m)   Wt 185 lb (83.9 kg)   BMI 31.76 kg/m²   Alert female no acute distress temporal arteries 1+ without nodularity or tenderness  Assessment:      Polymyalgia rheumatica    Plan:      A CRP will be checked and if near normal prednisone will be reduced to 8 mg a day. I will see her back in 6 weeks time.         Efraín Morris MD, MD, 11/10/2021 1:27 PM

## 2021-11-15 ENCOUNTER — CLINICAL DOCUMENTATION (OUTPATIENT)
Dept: OTHER | Age: 76
End: 2021-11-15

## 2021-11-15 DIAGNOSIS — I10 ESSENTIAL HYPERTENSION: Chronic | ICD-10-CM

## 2021-11-15 DIAGNOSIS — I25.10 CORONARY ARTERY DISEASE INVOLVING NATIVE CORONARY ARTERY OF NATIVE HEART WITHOUT ANGINA PECTORIS: Chronic | ICD-10-CM

## 2021-11-15 DIAGNOSIS — R73.09 ELEVATED GLUCOSE: ICD-10-CM

## 2021-11-15 LAB
ALT SERPL-CCNC: 17 U/L (ref 10–40)
ANION GAP SERPL CALCULATED.3IONS-SCNC: 12 MMOL/L (ref 3–16)
AST SERPL-CCNC: 28 U/L (ref 15–37)
BUN BLDV-MCNC: 24 MG/DL (ref 7–20)
CALCIUM SERPL-MCNC: 9 MG/DL (ref 8.3–10.6)
CHLORIDE BLD-SCNC: 96 MMOL/L (ref 99–110)
CHOLESTEROL, FASTING: 132 MG/DL (ref 0–199)
CO2: 27 MMOL/L (ref 21–32)
CREAT SERPL-MCNC: 0.7 MG/DL (ref 0.6–1.2)
GFR AFRICAN AMERICAN: >60
GFR NON-AFRICAN AMERICAN: >60
GLUCOSE BLD-MCNC: 159 MG/DL (ref 70–99)
HCT VFR BLD CALC: 36.1 % (ref 36–48)
HDLC SERPL-MCNC: 69 MG/DL (ref 40–60)
HEMOGLOBIN: 11.3 G/DL (ref 12–16)
LDL CHOLESTEROL CALCULATED: 48 MG/DL
MCH RBC QN AUTO: 30.4 PG (ref 26–34)
MCHC RBC AUTO-ENTMCNC: 31.4 G/DL (ref 31–36)
MCV RBC AUTO: 96.9 FL (ref 80–100)
PDW BLD-RTO: 14.4 % (ref 12.4–15.4)
PLATELET # BLD: 242 K/UL (ref 135–450)
PMV BLD AUTO: 8.3 FL (ref 5–10.5)
POTASSIUM SERPL-SCNC: 4.6 MMOL/L (ref 3.5–5.1)
RBC # BLD: 3.72 M/UL (ref 4–5.2)
SODIUM BLD-SCNC: 135 MMOL/L (ref 136–145)
TRIGLYCERIDE, FASTING: 74 MG/DL (ref 0–150)
VLDLC SERPL CALC-MCNC: 15 MG/DL
WBC # BLD: 7 K/UL (ref 4–11)

## 2021-11-16 ENCOUNTER — TELEPHONE (OUTPATIENT)
Dept: CARDIOLOGY CLINIC | Age: 76
End: 2021-11-16

## 2021-11-16 LAB
ESTIMATED AVERAGE GLUCOSE: 231.7 MG/DL
HBA1C MFR BLD: 9.7 %

## 2021-11-16 NOTE — TELEPHONE ENCOUNTER
Spoke to patient. She will discuss with PCP. She has been on Prednisone which is elevating her sugars.    ----- Message from Kathy Carey MD sent at 11/16/2021 12:07 PM EST -----  Labs look good except diabetes uncontrolled.  Needs to discuss with PCP>    MMK

## 2021-11-24 RX ORDER — PREDNISONE 1 MG/1
TABLET ORAL
Qty: 30 TABLET | Refills: 2 | Status: SHIPPED | OUTPATIENT
Start: 2021-11-24 | End: 2022-03-07

## 2021-12-22 ENCOUNTER — OFFICE VISIT (OUTPATIENT)
Dept: RHEUMATOLOGY | Age: 76
End: 2021-12-22
Payer: MEDICARE

## 2021-12-22 ENCOUNTER — HOSPITAL ENCOUNTER (OUTPATIENT)
Age: 76
Discharge: HOME | End: 2021-12-22
Payer: MEDICARE

## 2021-12-22 VITALS
SYSTOLIC BLOOD PRESSURE: 120 MMHG | BODY MASS INDEX: 32.1 KG/M2 | HEART RATE: 84 BPM | DIASTOLIC BLOOD PRESSURE: 70 MMHG | WEIGHT: 187 LBS

## 2021-12-22 DIAGNOSIS — M35.3 PMR (POLYMYALGIA RHEUMATICA) (HCC): ICD-10-CM

## 2021-12-22 DIAGNOSIS — M35.3 PMR (POLYMYALGIA RHEUMATICA) (HCC): Primary | ICD-10-CM

## 2021-12-22 LAB — C-REACTIVE PROTEIN: 14.8 MG/L (ref 0–5.1)

## 2021-12-22 PROCEDURE — 1036F TOBACCO NON-USER: CPT | Performed by: INTERNAL MEDICINE

## 2021-12-22 PROCEDURE — 1123F ACP DISCUSS/DSCN MKR DOCD: CPT | Performed by: INTERNAL MEDICINE

## 2021-12-22 PROCEDURE — 99213 OFFICE O/P EST LOW 20 MIN: CPT | Performed by: INTERNAL MEDICINE

## 2021-12-22 PROCEDURE — G8428 CUR MEDS NOT DOCUMENT: HCPCS | Performed by: INTERNAL MEDICINE

## 2021-12-22 PROCEDURE — 86140 C-REACTIVE PROTEIN: CPT

## 2021-12-22 PROCEDURE — 1090F PRES/ABSN URINE INCON ASSESS: CPT | Performed by: INTERNAL MEDICINE

## 2021-12-22 PROCEDURE — G8399 PT W/DXA RESULTS DOCUMENT: HCPCS | Performed by: INTERNAL MEDICINE

## 2021-12-22 PROCEDURE — 4040F PNEUMOC VAC/ADMIN/RCVD: CPT | Performed by: INTERNAL MEDICINE

## 2021-12-22 PROCEDURE — G8417 CALC BMI ABV UP PARAM F/U: HCPCS | Performed by: INTERNAL MEDICINE

## 2021-12-22 PROCEDURE — G8484 FLU IMMUNIZE NO ADMIN: HCPCS | Performed by: INTERNAL MEDICINE

## 2021-12-22 PROCEDURE — 36415 COLL VENOUS BLD VENIPUNCTURE: CPT

## 2021-12-22 NOTE — PROGRESS NOTES
Subjective:       Demetrio Washington is a 68 y.o. female the patient returns for follow-up of polymyalgia rheumatica. Her last CRP was elevated at 10 and she remains on 8 mg of prednisone a day. Overall she states feels well      Review of Systems:      Denies symptoms of giant cell arteritis. Denies symptoms of polymyalgia rheumatica o  Objective:       /70   Pulse 84   Wt 187 lb (84.8 kg)   BMI 32.10 kg/m²   Alert female no acute distress temporal arteries 1+ without nodularity or tenderness. No synovitis small joints of the hands or wrists  Assessment:        Polymyalgia rheumatica  Plan:      A CRP will be checked if near normal prednisone dose will be reduced. I will see her back in 6 to 7 weeks time.     Alvaro Osman MD, MD, 12/22/2021 12:50 PM

## 2021-12-23 ENCOUNTER — TELEPHONE (OUTPATIENT)
Dept: RHEUMATOLOGY | Age: 76
End: 2021-12-23

## 2022-01-11 DIAGNOSIS — Z79.899 ENCOUNTER FOR LONG-TERM (CURRENT) USE OF HIGH-RISK MEDICATION: ICD-10-CM

## 2022-01-11 DIAGNOSIS — Z51.81 ENCOUNTER FOR THERAPEUTIC DRUG MONITORING: ICD-10-CM

## 2022-01-12 RX ORDER — MELOXICAM 15 MG/1
TABLET ORAL
Qty: 90 TABLET | Refills: 0 | Status: SHIPPED | OUTPATIENT
Start: 2022-01-12 | End: 2022-03-16 | Stop reason: SDUPTHER

## 2022-01-19 ENCOUNTER — HOSPITAL ENCOUNTER (OUTPATIENT)
Dept: WOMENS IMAGING | Age: 77
Discharge: HOME | End: 2022-01-19
Payer: MEDICARE

## 2022-01-19 ENCOUNTER — HOSPITAL ENCOUNTER (OUTPATIENT)
Dept: CT IMAGING | Age: 77
Discharge: HOME | End: 2022-01-19
Payer: MEDICARE

## 2022-01-19 VITALS — WEIGHT: 182 LBS | HEIGHT: 65 IN | BODY MASS INDEX: 30.32 KG/M2

## 2022-01-19 DIAGNOSIS — Z12.31 BREAST CANCER SCREENING BY MAMMOGRAM: ICD-10-CM

## 2022-01-19 DIAGNOSIS — R91.1 PULMONARY NODULE: ICD-10-CM

## 2022-01-19 PROCEDURE — 71250 CT THORAX DX C-: CPT

## 2022-01-19 PROCEDURE — 77063 BREAST TOMOSYNTHESIS BI: CPT

## 2022-01-20 NOTE — RESULT ENCOUNTER NOTE
Has some increased nodular change on the left that looks more like inflammation / infection. Can we reach out to her and see how she is feeling. She should have a follow up appointment. Looks like she is seeing me in April. IF she is feeling OK, we can keep that appointment. If she is sick, let's get her into see one of the docs before then.   Randall Gibbs MSN APRN-ACNP CCRN

## 2022-02-02 ENCOUNTER — OFFICE VISIT (OUTPATIENT)
Dept: RHEUMATOLOGY | Age: 77
End: 2022-02-02
Payer: MEDICARE

## 2022-02-02 VITALS
DIASTOLIC BLOOD PRESSURE: 76 MMHG | HEIGHT: 65 IN | SYSTOLIC BLOOD PRESSURE: 118 MMHG | BODY MASS INDEX: 29.99 KG/M2 | WEIGHT: 180 LBS | HEART RATE: 88 BPM

## 2022-02-02 DIAGNOSIS — M35.3 PMR (POLYMYALGIA RHEUMATICA) (HCC): Primary | ICD-10-CM

## 2022-02-02 DIAGNOSIS — M35.3 PMR (POLYMYALGIA RHEUMATICA) (HCC): ICD-10-CM

## 2022-02-02 PROCEDURE — G8417 CALC BMI ABV UP PARAM F/U: HCPCS | Performed by: INTERNAL MEDICINE

## 2022-02-02 PROCEDURE — 99213 OFFICE O/P EST LOW 20 MIN: CPT | Performed by: INTERNAL MEDICINE

## 2022-02-02 PROCEDURE — 1036F TOBACCO NON-USER: CPT | Performed by: INTERNAL MEDICINE

## 2022-02-02 PROCEDURE — 1090F PRES/ABSN URINE INCON ASSESS: CPT | Performed by: INTERNAL MEDICINE

## 2022-02-02 PROCEDURE — G8484 FLU IMMUNIZE NO ADMIN: HCPCS | Performed by: INTERNAL MEDICINE

## 2022-02-02 PROCEDURE — 4040F PNEUMOC VAC/ADMIN/RCVD: CPT | Performed by: INTERNAL MEDICINE

## 2022-02-02 PROCEDURE — G8427 DOCREV CUR MEDS BY ELIG CLIN: HCPCS | Performed by: INTERNAL MEDICINE

## 2022-02-02 PROCEDURE — 1123F ACP DISCUSS/DSCN MKR DOCD: CPT | Performed by: INTERNAL MEDICINE

## 2022-02-02 PROCEDURE — G8399 PT W/DXA RESULTS DOCUMENT: HCPCS | Performed by: INTERNAL MEDICINE

## 2022-02-02 RX ORDER — ASPIRIN 81 MG/1
81 TABLET, CHEWABLE ORAL DAILY
COMMUNITY

## 2022-02-02 RX ORDER — CLOPIDOGREL BISULFATE 75 MG/1
75 TABLET ORAL DAILY
COMMUNITY
Start: 2022-01-27

## 2022-02-02 NOTE — PROGRESS NOTES
Subjective:       Kamini Oh is a 68 y.o. female patient is seen back for polymyalgia rheumatica. Since her last visit she was hospitalized with influenza A needed a stent placement and was discovered to have abnormal chest CT consistent with inflammatory lung disease or post infection      Review of Systems:      Breathing easily denies symptoms of giant cell arteritis denies symptoms of PMR  Objective:     /76   Pulse 88   Ht 5' 5\" (1.651 m)   Wt 180 lb (81.6 kg)   BMI 29.95 kg/m²   Alert female no acute distress. Temporal arteries 1+ without nodularity or tenderness    Assessment:      Polymyalgia rheumatica    Plan:      A CRP will be checked. She'll reduce prednisone to 6 mg a day. I'll see her back pain 6 to 7 weeks.         Trish Montes MD, MD, 2/2/2022 11:41 AM

## 2022-02-03 LAB — C-REACTIVE PROTEIN: 6.2 MG/L (ref 0–5.1)

## 2022-03-07 RX ORDER — PREDNISONE 1 MG/1
TABLET ORAL
Qty: 30 TABLET | Refills: 2 | Status: SHIPPED
Start: 2022-03-07 | End: 2022-06-08 | Stop reason: DRUGHIGH

## 2022-03-16 ENCOUNTER — OFFICE VISIT (OUTPATIENT)
Dept: RHEUMATOLOGY | Age: 77
End: 2022-03-16
Payer: MEDICARE

## 2022-03-16 VITALS
WEIGHT: 183.25 LBS | HEART RATE: 76 BPM | DIASTOLIC BLOOD PRESSURE: 64 MMHG | BODY MASS INDEX: 30.53 KG/M2 | HEIGHT: 65 IN | SYSTOLIC BLOOD PRESSURE: 118 MMHG

## 2022-03-16 DIAGNOSIS — L40.50 PSORIATIC ARTHRITIS (HCC): ICD-10-CM

## 2022-03-16 DIAGNOSIS — M35.3 POLYMYALGIA RHEUMATICA (HCC): ICD-10-CM

## 2022-03-16 DIAGNOSIS — Z51.81 ENCOUNTER FOR THERAPEUTIC DRUG MONITORING: ICD-10-CM

## 2022-03-16 DIAGNOSIS — Z79.899 ENCOUNTER FOR LONG-TERM (CURRENT) USE OF HIGH-RISK MEDICATION: ICD-10-CM

## 2022-03-16 LAB — C-REACTIVE PROTEIN: 4.2 MG/L (ref 0–5.1)

## 2022-03-16 PROCEDURE — G8484 FLU IMMUNIZE NO ADMIN: HCPCS | Performed by: INTERNAL MEDICINE

## 2022-03-16 PROCEDURE — 1036F TOBACCO NON-USER: CPT | Performed by: INTERNAL MEDICINE

## 2022-03-16 PROCEDURE — G8417 CALC BMI ABV UP PARAM F/U: HCPCS | Performed by: INTERNAL MEDICINE

## 2022-03-16 PROCEDURE — 4040F PNEUMOC VAC/ADMIN/RCVD: CPT | Performed by: INTERNAL MEDICINE

## 2022-03-16 PROCEDURE — G8399 PT W/DXA RESULTS DOCUMENT: HCPCS | Performed by: INTERNAL MEDICINE

## 2022-03-16 PROCEDURE — 99213 OFFICE O/P EST LOW 20 MIN: CPT | Performed by: INTERNAL MEDICINE

## 2022-03-16 PROCEDURE — 1123F ACP DISCUSS/DSCN MKR DOCD: CPT | Performed by: INTERNAL MEDICINE

## 2022-03-16 PROCEDURE — G8428 CUR MEDS NOT DOCUMENT: HCPCS | Performed by: INTERNAL MEDICINE

## 2022-03-16 PROCEDURE — 1090F PRES/ABSN URINE INCON ASSESS: CPT | Performed by: INTERNAL MEDICINE

## 2022-03-16 RX ORDER — MELOXICAM 15 MG/1
TABLET ORAL
Qty: 90 TABLET | Refills: 0 | Status: SHIPPED | OUTPATIENT
Start: 2022-03-16 | End: 2022-05-31

## 2022-03-16 NOTE — PROGRESS NOTES
Subjective:       River Amador is a 68 y.o. female patient returns for follow-up of polymyalgia rheumatica. Her last CRP was 6.2. She had no difficulty decreasing her prednisone to 6 mg a day. Review of Systems:    Denies symptoms of giant cell arteritis. Denies symptoms of polymyalgia rheumatica    Objective:       /64   Pulse 76   Ht 5' 5\" (1.651 m)   Wt 183 lb 4 oz (83.1 kg)   BMI 30.49 kg/m²   Alert female no acute distress. Temporal arteries 1+ without nodularity or tenderness. No peripheral synovitis  Assessment:    Polymyalgia rheumatica      Plan:      A CRP will be obtained today. If near normal prednisone will be reduced. I will see patient back in 6 weeks time.         Janessa Man MD, MD, 3/16/2022 1:47 PM

## 2022-03-23 ENCOUNTER — TELEPHONE (OUTPATIENT)
Dept: RHEUMATOLOGY | Age: 77
End: 2022-03-23

## 2022-04-27 ENCOUNTER — OFFICE VISIT (OUTPATIENT)
Dept: RHEUMATOLOGY | Age: 77
End: 2022-04-27
Payer: MEDICARE

## 2022-04-27 VITALS
WEIGHT: 186 LBS | HEART RATE: 72 BPM | SYSTOLIC BLOOD PRESSURE: 112 MMHG | BODY MASS INDEX: 30.99 KG/M2 | HEIGHT: 65 IN | DIASTOLIC BLOOD PRESSURE: 70 MMHG

## 2022-04-27 DIAGNOSIS — M35.3 PMR (POLYMYALGIA RHEUMATICA) (HCC): Primary | ICD-10-CM

## 2022-04-27 DIAGNOSIS — M35.3 PMR (POLYMYALGIA RHEUMATICA) (HCC): ICD-10-CM

## 2022-04-27 LAB
C-REACTIVE PROTEIN: 13.7 MG/L (ref 0–5.1)
TSH SERPL DL<=0.05 MIU/L-ACNC: 0.39 UIU/ML (ref 0.27–4.2)

## 2022-04-27 PROCEDURE — 4040F PNEUMOC VAC/ADMIN/RCVD: CPT | Performed by: INTERNAL MEDICINE

## 2022-04-27 PROCEDURE — 1090F PRES/ABSN URINE INCON ASSESS: CPT | Performed by: INTERNAL MEDICINE

## 2022-04-27 PROCEDURE — 99213 OFFICE O/P EST LOW 20 MIN: CPT | Performed by: INTERNAL MEDICINE

## 2022-04-27 PROCEDURE — 1123F ACP DISCUSS/DSCN MKR DOCD: CPT | Performed by: INTERNAL MEDICINE

## 2022-04-27 PROCEDURE — 1036F TOBACCO NON-USER: CPT | Performed by: INTERNAL MEDICINE

## 2022-04-27 PROCEDURE — G8417 CALC BMI ABV UP PARAM F/U: HCPCS | Performed by: INTERNAL MEDICINE

## 2022-04-27 PROCEDURE — G8427 DOCREV CUR MEDS BY ELIG CLIN: HCPCS | Performed by: INTERNAL MEDICINE

## 2022-04-27 PROCEDURE — G8399 PT W/DXA RESULTS DOCUMENT: HCPCS | Performed by: INTERNAL MEDICINE

## 2022-04-27 RX ORDER — PREDNISONE 1 MG/1
4 TABLET ORAL DAILY
Qty: 120 TABLET | Refills: 3 | Status: SHIPPED | OUTPATIENT
Start: 2022-04-27 | End: 2022-05-27

## 2022-04-27 NOTE — PROGRESS NOTES
Subjective:       Waldo Martin is a 68 y.o. female the patient returns for follow-up of polymyalgia rheumatica. She had no difficulty decreasing her prednisone to 5 mg a day. Her last CRP was within normal limits. Occasional pain in her left buttock but otherwise doing well    Review of Systems:    Denies symptoms of giant cell arteritis. Denies symptoms of polymyalgia rheumatica    Objective:     /70   Pulse 72   Ht 5' 5\" (1.651 m)   Wt 186 lb (84.4 kg)   BMI 30.95 kg/m²   Alert female no acute distress. Temporal arteries 1+ without nodularity or tenderness. Assessment:    PMR    Plan:      A CRP will be checked. She will reduce prednisone to 4 mg a day. I will see her back in 6 weeks time.       Michael Latif MD, MD, 4/27/2022 2:12 PM

## 2022-04-28 ENCOUNTER — OFFICE VISIT (OUTPATIENT)
Dept: PULMONOLOGY | Age: 77
End: 2022-04-28
Payer: MEDICARE

## 2022-04-28 VITALS
OXYGEN SATURATION: 94 % | WEIGHT: 186 LBS | DIASTOLIC BLOOD PRESSURE: 60 MMHG | BODY MASS INDEX: 30.99 KG/M2 | SYSTOLIC BLOOD PRESSURE: 102 MMHG | HEART RATE: 79 BPM | HEIGHT: 65 IN

## 2022-04-28 DIAGNOSIS — I42.9 CARDIOMYOPATHY, UNSPECIFIED TYPE (HCC): Chronic | ICD-10-CM

## 2022-04-28 DIAGNOSIS — J44.9 COPD, SEVERE (HCC): ICD-10-CM

## 2022-04-28 DIAGNOSIS — R91.1 PULMONARY NODULE: Primary | ICD-10-CM

## 2022-04-28 PROCEDURE — 1036F TOBACCO NON-USER: CPT | Performed by: NURSE PRACTITIONER

## 2022-04-28 PROCEDURE — 1123F ACP DISCUSS/DSCN MKR DOCD: CPT | Performed by: NURSE PRACTITIONER

## 2022-04-28 PROCEDURE — 4040F PNEUMOC VAC/ADMIN/RCVD: CPT | Performed by: NURSE PRACTITIONER

## 2022-04-28 PROCEDURE — G8399 PT W/DXA RESULTS DOCUMENT: HCPCS | Performed by: NURSE PRACTITIONER

## 2022-04-28 PROCEDURE — 3023F SPIROM DOC REV: CPT | Performed by: NURSE PRACTITIONER

## 2022-04-28 PROCEDURE — G8427 DOCREV CUR MEDS BY ELIG CLIN: HCPCS | Performed by: NURSE PRACTITIONER

## 2022-04-28 PROCEDURE — G8417 CALC BMI ABV UP PARAM F/U: HCPCS | Performed by: NURSE PRACTITIONER

## 2022-04-28 PROCEDURE — 99213 OFFICE O/P EST LOW 20 MIN: CPT | Performed by: NURSE PRACTITIONER

## 2022-04-28 PROCEDURE — 1090F PRES/ABSN URINE INCON ASSESS: CPT | Performed by: NURSE PRACTITIONER

## 2022-04-28 ASSESSMENT — ENCOUNTER SYMPTOMS
COUGH: 1
SHORTNESS OF BREATH: 1
CONSTIPATION: 0
ABDOMINAL PAIN: 0
WHEEZING: 0
BACK PAIN: 1
COLOR CHANGE: 0

## 2022-04-28 NOTE — PROGRESS NOTES
[facility] Follow Up Note    Subjective:   CHIEF COMPLAINT / HPI: SOB, severe COPD   The patient is 68 y.o. female who presents today for a routine follow up visit related to the above mentioned issues. There is a PMH significant for CAD, cardiomyopathy, psoriatic arthritis and stopped MTX and Taltz d/t recurrent infection and HTN. She was last evaluated by me in October. At time she reported baseline FORDE. Presently she reports FORDE which is no worse, though she does note that she has struggled since her last appointment. She was hospitalized in January with influenza A. There ECHO was completed and found her EF to be in the 25-30% range. Angiogram revealed obstructive CAD and she under went stent placement. She has recently had repeat ECHO and plans to follow up with cardiology next week. Today she reports intermittent cough which is at times productive. This is typical for her. She is seeing Rheumatology and has been on Prednisone but is down to 4mg. This helps her pain. She continues to struggle with back pain and feels like this contributes to her breathing issues. She has also been compliant with Anoro. She has not required supplemental O2.         Past Medical History:   Diagnosis Date    CAD (coronary artery disease)     [facility] [address]     basal cell skin    Cardiomyopathy [facility] [facility]     CHF (congestive heart failure) ([facility] [facility]     Hyperlipidemia     Hypertension     Hypothyroidism     Psoriasis      Social History:    Social History     Tobacco Use   Smoking Status Former Smoker    Packs/day: 0.50    Years: 35.00    Pack years: 17.50    Types: Cigarettes    Quit date: 1991    Years since quittin.8   Smokeless Tobacco Never Used   Tobacco Comment    started to smoke at 21 / smoked up to 0.5 ppd for 35 yrs off and  on /      Current Medications:     Current Outpatient Medications on File Prior to Visit   Medication Sig Dispense Refill    predniSONE (DELTASONE) 1 MG No current facility-administered medications on file prior to visit. Review of Systems   Constitutional: Negative for chills and fever. HENT: Negative for congestion, ear pain and postnasal drip. Respiratory: Positive for cough and shortness of breath. Negative for wheezing. Cardiovascular: Negative for chest pain and leg swelling. Gastrointestinal: Negative for abdominal pain and constipation. Musculoskeletal: Positive for back pain. Negative for arthralgias and joint swelling. Skin: Negative for color change and pallor. Allergic/Immunologic: Negative for environmental allergies and food allergies. Psychiatric/Behavioral: Negative for agitation and confusion. Objective:       VITALS:  /60   Pulse 79   Ht 5' 5\" (1.651 m)   Wt 186 lb (84.4 kg)   SpO2 94% Comment: RA  Breastfeeding No   BMI 30.95 kg/m²      Physical Exam  Vitals reviewed. Constitutional:       General: She is not in acute distress. Appearance: She is well-developed. She is not diaphoretic. HENT:      Head: Normocephalic and atraumatic. Eyes:      General:         Right eye: No discharge. Left eye: No discharge. Neck:      Trachea: No tracheal deviation. Cardiovascular:      Rate and Rhythm: Normal rate and regular rhythm. Pulmonary:      Effort: No respiratory distress. Breath sounds: No stridor. No wheezing, rhonchi or rales. Chest:      Chest wall: No tenderness. Abdominal:      General: There is no distension. Tenderness: There is no abdominal tenderness. Skin:     General: Skin is warm and dry. Findings: No rash. Neurological:      Mental Status: She is alert and oriented to person, place, and time. Psychiatric:         Behavior: Behavior normal.          DATA:      Radiology Review:  Pertinent images / reports were reviewed as a part of this visit.      CT chest done 1/19/22:  Increasing nodularity in the lingula and left lower lobe, with tree-in-bud morphology favoring postinflammatory-infectious change. Consider 3 to six-month follow-up noncontrast chest CT   Scattered tree-in-bud nodularity right upper lobe, right middle lobe and right lower lobe, similar to prior. Severe coronary artery calcification     CT chest done June 2020:  1. No acute cardiopulmonary disease. 2. Stable tree-in-bud nodularity likely representing sequelae of prior infection/inflammation. 3. No new or dominant pulmonary nodule. 4. Severe atherosclerosis. Last PFTs done 11/6/20:  Spirometry reveals decreased FVC at 1.78 liters, which is 63% predicted. FEV1 is decreased at 0.94 liters, which is 44% predicted. FEV1/FVC  ratio is decreased at 53%. Expiratory flow rates are decreased. There  is no postbronchodilator study. Lung volumes reveal normal total lung  capacity, vital capacity is reduced at 66% predicted, residual volume is  increased at 172% predicted, diffusion capacity is decreased at 48%  predicted. In comparison to a study from 03/2018, FVC has decreased by  15%, FEV1 has decreased by 10%, diffusion capacity has decreased by 17%. IMPRESSION:  Severe obstructive lung disease with air trapping. This is  worse in comparison to the previous study. Assessment / Plan:   1. COPD, severe ([address]  - Significant FORDE but not worse  - Intermittent cough  - Continue Anoro  - Increase ALMA when symptoms are worse  - Exercise ox done in the office today on RA and did not reveal desaturation    2. Pulmonary nodule  - Most recent CT chest did show some worsening nodularity to LLL  - Repeat CT CHEST WO CONTRAST; for further evaluation   - No evidence for evolving infectious process based on symptoms but has been on chronic Prednisone  - Await results    3.  Cardiomyopathy, unspecified type ([address]  - EF in the 25-30% range in December  - ECHO done last week reveals improvement in EF to 50-55% range  - She continues to f/u with cardiology     Return in about 3 months (around 7/28/2022). RTC sooner if symptoms worsen.     Yolis Phelan MSN APRN-ACNP CCRN

## 2022-05-28 DIAGNOSIS — Z51.81 ENCOUNTER FOR THERAPEUTIC DRUG MONITORING: ICD-10-CM

## 2022-05-28 DIAGNOSIS — M35.3 POLYMYALGIA RHEUMATICA (HCC): ICD-10-CM

## 2022-05-28 DIAGNOSIS — Z79.899 ENCOUNTER FOR LONG-TERM (CURRENT) USE OF HIGH-RISK MEDICATION: ICD-10-CM

## 2022-05-31 ENCOUNTER — HOSPITAL ENCOUNTER (OUTPATIENT)
Dept: CT IMAGING | Age: 77
Discharge: HOME | End: 2022-05-31
Payer: MEDICARE

## 2022-05-31 DIAGNOSIS — R91.1 PULMONARY NODULE: ICD-10-CM

## 2022-05-31 PROCEDURE — 71250 CT THORAX DX C-: CPT

## 2022-05-31 RX ORDER — MELOXICAM 15 MG/1
TABLET ORAL
Qty: 90 TABLET | Refills: 3 | Status: SHIPPED | OUTPATIENT
Start: 2022-05-31

## 2022-06-01 ENCOUNTER — TELEPHONE (OUTPATIENT)
Dept: PULMONOLOGY | Age: 77
End: 2022-06-01

## 2022-06-01 NOTE — TELEPHONE ENCOUNTER
I discussed the results of the patient's recent CT chest with both her and Dr. Yoandy Doty. Nodular changes appears progressive. Given her risk for infection d/t chronic prednisone use, we discussed possible options. She reports cough persists but is no worse. She is not febrile. She does at times bring up thick dark mucous. Will get her in for further evaluation with Dr. Yoandy Doty to determine if there is utility in further evaluation with bronchoscopy.      Fredrick Burton MSN APRN-ACNP CCRN

## 2022-06-01 NOTE — RESULT ENCOUNTER NOTE
Will plan for further evaluation by Dr. Boswell Fitting +/- bronchoscopic evaluation to rule out underlying infectious etiology.      Juancarlos Dubon MSN APRN-ACNP CCRN

## 2022-06-03 ENCOUNTER — OFFICE VISIT (OUTPATIENT)
Dept: PULMONOLOGY | Age: 77
End: 2022-06-03
Payer: MEDICARE

## 2022-06-03 VITALS — HEART RATE: 78 BPM | OXYGEN SATURATION: 95 %

## 2022-06-03 DIAGNOSIS — J44.9 COPD, SEVERE (HCC): ICD-10-CM

## 2022-06-03 DIAGNOSIS — J47.9 BRONCHIECTASIS WITHOUT COMPLICATION (HCC): ICD-10-CM

## 2022-06-03 DIAGNOSIS — R06.02 SOB (SHORTNESS OF BREATH): Primary | ICD-10-CM

## 2022-06-03 DIAGNOSIS — I42.9 CARDIOMYOPATHY, UNSPECIFIED TYPE (HCC): Chronic | ICD-10-CM

## 2022-06-03 PROCEDURE — 1123F ACP DISCUSS/DSCN MKR DOCD: CPT | Performed by: INTERNAL MEDICINE

## 2022-06-03 PROCEDURE — 1090F PRES/ABSN URINE INCON ASSESS: CPT | Performed by: INTERNAL MEDICINE

## 2022-06-03 PROCEDURE — 99214 OFFICE O/P EST MOD 30 MIN: CPT | Performed by: INTERNAL MEDICINE

## 2022-06-03 PROCEDURE — 3023F SPIROM DOC REV: CPT | Performed by: INTERNAL MEDICINE

## 2022-06-03 PROCEDURE — 1036F TOBACCO NON-USER: CPT | Performed by: INTERNAL MEDICINE

## 2022-06-03 PROCEDURE — G8428 CUR MEDS NOT DOCUMENT: HCPCS | Performed by: INTERNAL MEDICINE

## 2022-06-03 PROCEDURE — G8399 PT W/DXA RESULTS DOCUMENT: HCPCS | Performed by: INTERNAL MEDICINE

## 2022-06-03 PROCEDURE — G8417 CALC BMI ABV UP PARAM F/U: HCPCS | Performed by: INTERNAL MEDICINE

## 2022-06-03 NOTE — PROGRESS NOTES
[facility]  Progress Note  Maximino Cottrell MD       Brooklyn Border   YOB: 1945    Date of Visit:  6/3/2022    Assessment/Plan:  1. SOB (shortness of breath) & 2. Cough  CT showing scattered tree-in-bud nodular infiltrate that has worsened in comparison to previous. She is having cough and sputum  Discussed options  Decided on Bronch with BAL  I described procedure and potential complications such as PTX and bleeding. She is willing to proceed. 3. COPD, severe ([address]  PFT 11/2020:  Spirometry reveals decreased FVC at 1.78 liters, which is 63% predicted. FEV1 is decreased at 0.94 liters, which is 44% predicted. FEV1/FVC  ratio is decreased at 53%. Expiratory flow rates are decreased. There  is no postbronchodilator study. Lung volumes reveal normal total lung  capacity, vital capacity is reduced at 66% predicted, residual volume is  increased at 172% predicted, diffusion capacity is decreased at 48%  predicted. In comparison to a study from 03/2018, FVC has decreased by  15%, FEV1 has decreased by 10%, diffusion capacity has decreased by 17%.     IMPRESSION:  Severe obstructive lung disease with air trapping. This is  worse in comparison to the previous study. Continue Anoro  I have independently reviewed pulmonary function testing. Recent testing shows severe disease and worsening hyperinflation. 4. Cardiomyopathy ([address]  Recent hospitalization at [address] [place] [address] is \"better\" sees Cardiology at [facility]    5. Bronchiectasis  Noted on CT      FOLLOW UP: 6 months      Chief Complaint   Patient presents with    Cough     saw Sheryle Dus and had CT done Here to discuss if she needs a bronch Had Flu A in Jan and was in hospital        HPI  The patient presents with a chief complaint of shortness of breath and cough. She is known to have COPD which is severe. She has been doing well. No recent flare ups. She feels like her basleine dyspnea is actually better. She  Can do things in the yard she couldn't do before. Heddy  No Chest pain, Nausea or vomiting reported      Review of Systems  As documented in HPI     Physical Exam:  Well developed, well nourished  Alert and oriented  Sclera is clear  No cervical adenopathy  No JVD. Chest examination is clear. Cardiac examination reveals regular rate and rhythm without murmur, gallop or rub. The abdomen is soft, nontender and nondistended. There is no clubbing, cyanosis or edema of the extremities. There is no obvious skin rash. No focal neuro deficicts  Normal mood and affect    Allergies   Allergen Reactions    No Known Allergies     Statins Other (See Comments)     Joint pain , muscle aches     Prior to Visit Medications    Medication Sig Taking?  Authorizing Provider   meloxicam (MOBIC) 15 MG tablet TAKE 1 TABLET BY MOUTH  DAILY  Claude Decant, MD   predniSONE (DELTASONE) 5 MG tablet TAKE ONE TABLET BY MOUTH DAILY ALONG WITH THREE (1MG) TABLETS FOR A TOTAL DAILY DOSE OF 8MG  Patient taking differently: Take 4 mg by mouth daily   Claude Decant, MD   clopidogrel (PLAVIX) 75 MG tablet Take 75 mg by mouth daily  Historical Provider, MD   aspirin 81 MG chewable tablet Take 81 mg by mouth daily  Historical Provider, MD   furosemide (LASIX) 40 MG tablet TAKE ONE TABLET BY MOUTH DAILY AS NEEDED  Joie Lang MD   rosuvastatin (CRESTOR) 5 MG tablet TAKE 1 TABLET BY MOUTH  DAILY  Joie Lang MD   folic acid (FOLVITE) 1 MG tablet TAKE ONE TABLET BY MOUTH DAILY  Claude Decant, MD   ANORO ELLIPTA 62.5-25 MCG/INH AEPB inhaler USE 1 INHALATION BY MOUTH  DAILY  Robert Villafana, APRN - CNP   carvedilol (COREG) 25 MG tablet Mil Palomares MD   losartan (COZAAR) 50 MG tablet TAKE 1 TABLET BY MOUTH  TWICE DAILY  Joie Lang MD   glimepiride (AMARYL) 4 MG tablet Take 4 mg by mouth every morning (before breakfast)  Historical Provider, MD   venlafaxine (EFFEXOR XR) 75 MG extended release capsule Take 75 mg by mouth daily  Historical Provider, MD   omeprazole (PRILOSEC) 20 MG delayed release capsule Take 1 capsule by mouth daily  BRIANNE Recio CNP   acetaminophen (TYLENOL) 500 MG tablet Take 500 mg by mouth every 6 hours as needed for Pain  Historical Provider, MD   HYDROcodone-acetaminophen (NORCO) 5-325 MG per tablet Take 1 tablet by mouth every 6 hours as needed for Pain. Historical Provider, MD   diclofenac sodium 1 % GEL Apply 4 g topically 4 times daily. Walter Nguyen MD   Insulin Glargine (LANTUS SC) Inject 30 Units into the skin 2 times daily (before meals)   Historical Provider, MD   Insulin Aspart (NOVOLOG FLEXPEN SC) Inject 10 Units into the skin as needed 1-2 daily prn per pt  Historical Provider, MD   levothyroxine (SYNTHROID) 100 MCG tablet Take 75 mcg by mouth daily   Historical Provider, MD   calcium carbonate (OSCAL) 500 MG TABS tablet Take 500 mg by mouth daily. Historical Provider, MD   perphenazine 2 MG tablet Take 2 mg by mouth nightly. Historical Provider, MD       Vitals:    22 1634   Pulse: 78   SpO2: 95%     There is no height or weight on file to calculate BMI.      Wt Readings from Last 3 Encounters:   22 186 lb (84.4 kg)   22 186 lb (84.4 kg)   22 183 lb 4 oz (83.1 kg)     BP Readings from Last 3 Encounters:   22 102/60   22 112/70   22 118/64        Social History     Tobacco Use   Smoking Status Former Smoker    Packs/day: 0.50    Years: 35.00    Pack years: 17.50    Types: Cigarettes    Quit date: 1991    Years since quittin.9   Smokeless Tobacco Never Used   Tobacco Comment    started to smoke at 20 / smoked up to 0.5 ppd for 35 yrs off and  on /

## 2022-06-06 ENCOUNTER — TELEPHONE (OUTPATIENT)
Dept: PULMONOLOGY | Age: 77
End: 2022-06-06

## 2022-06-06 RX ORDER — OXYCODONE AND ACETAMINOPHEN 7.5; 325 MG/1; MG/1
1 TABLET ORAL EVERY 8 HOURS PRN
COMMUNITY

## 2022-06-06 RX ORDER — GABAPENTIN 100 MG/1
100 CAPSULE ORAL 3 TIMES DAILY
COMMUNITY

## 2022-06-06 RX ORDER — METHOCARBAMOL 500 MG/1
500 TABLET, FILM COATED ORAL PRN
COMMUNITY
End: 2022-09-14

## 2022-06-06 RX ORDER — ALBUTEROL SULFATE 90 UG/1
2 AEROSOL, METERED RESPIRATORY (INHALATION) EVERY 6 HOURS PRN
COMMUNITY
End: 2022-09-14

## 2022-06-06 RX ORDER — FEXOFENADINE HCL 180 MG/1
180 TABLET ORAL DAILY
COMMUNITY
End: 2022-09-14

## 2022-06-06 NOTE — PROGRESS NOTES
Call placed to Dr. Blane Avila office to notify pt.had cardiac stent placed 1/3/22 and is on ASA/Plavix and to Dr. Sherrell Trevino office to see if it is ok to do bronchoscopy and if she can stop ASA/Plavix.

## 2022-06-06 NOTE — PROGRESS NOTES
Patient reached _X___ yes  _____ no   VM instructions left ____ yes   phone number ________                                ____ no-office notified          Date __6/9/22_______  Time _1030______  Arrival __0900  hosp-endo____    Nothing to eat or drink after midnight-follow your doctors prep instructions-this may include taking a second dose of your prep after midnight  Responsible adult 25 or older to stay on site while you are here-drive you home-stay with you after  Follow any instructions your doctors office has given you  Bring a complete list of all your medications and supplements including name,dose,how often taken the day of your procedure  If you normally take the following medications in the morning please do so the AM of your procedure with a small sip of water       Heart,blood pressure,seizure,thyroid or breathing medications-use your inhalers       DO NOT take blood pressure medications ending in \"ilan\" or \"pril\" the AM of procedure or evening prior-DO NOT TAKE LOSARTAN  Take half or your normal dose of any long acting insulins the night before your procedure-do not take any diabetic medications the AM of procedure  Follow your doctors instructions regarding stopping or taking  any blood thinners-if you do not have instructions-call them-CHECK PLAVIX/ASA  Any questions call your doctor  Other ____take anoro, coreg, levothyroxine, prednisone am of jprocedure __________________________________________________________      Susanne Esquivel POLICY(subject to change)             The current policy is 2 visitors per patient. There are no children allowed. Everyone must mask. Visiting hours are 8a-8p. Overnight visitors will be at the discretion of the nurse.

## 2022-06-06 NOTE — TELEPHONE ENCOUNTER
Pt left  saying she is having a procedure on Thursday and wants to know about the blood thinners she takes    Ph # [phone]

## 2022-06-06 NOTE — TELEPHONE ENCOUNTER
Pt is schedules for a bronc on 6-9-22, but pt got a stint put in back on 1-3-22 and is on Asprin and prov (?) and wants to know if she can still have the bronc.     Call her at [phone]

## 2022-06-08 ENCOUNTER — TELEPHONE (OUTPATIENT)
Dept: PULMONOLOGY | Age: 77
End: 2022-06-08

## 2022-06-08 ENCOUNTER — OFFICE VISIT (OUTPATIENT)
Dept: RHEUMATOLOGY | Age: 77
End: 2022-06-08
Payer: MEDICARE

## 2022-06-08 VITALS
HEART RATE: 76 BPM | WEIGHT: 181 LBS | BODY MASS INDEX: 30.16 KG/M2 | SYSTOLIC BLOOD PRESSURE: 118 MMHG | HEIGHT: 65 IN | DIASTOLIC BLOOD PRESSURE: 76 MMHG

## 2022-06-08 DIAGNOSIS — M35.3 PMR (POLYMYALGIA RHEUMATICA) (HCC): ICD-10-CM

## 2022-06-08 DIAGNOSIS — M35.3 PMR (POLYMYALGIA RHEUMATICA) (HCC): Primary | ICD-10-CM

## 2022-06-08 LAB — C-REACTIVE PROTEIN: 4.4 MG/L (ref 0–5.1)

## 2022-06-08 PROCEDURE — 1123F ACP DISCUSS/DSCN MKR DOCD: CPT | Performed by: INTERNAL MEDICINE

## 2022-06-08 PROCEDURE — 1036F TOBACCO NON-USER: CPT | Performed by: INTERNAL MEDICINE

## 2022-06-08 PROCEDURE — G8417 CALC BMI ABV UP PARAM F/U: HCPCS | Performed by: INTERNAL MEDICINE

## 2022-06-08 PROCEDURE — 99213 OFFICE O/P EST LOW 20 MIN: CPT | Performed by: INTERNAL MEDICINE

## 2022-06-08 PROCEDURE — 1090F PRES/ABSN URINE INCON ASSESS: CPT | Performed by: INTERNAL MEDICINE

## 2022-06-08 PROCEDURE — G8427 DOCREV CUR MEDS BY ELIG CLIN: HCPCS | Performed by: INTERNAL MEDICINE

## 2022-06-08 PROCEDURE — G8399 PT W/DXA RESULTS DOCUMENT: HCPCS | Performed by: INTERNAL MEDICINE

## 2022-06-08 RX ORDER — LIDOCAINE 50 MG/G
1 PATCH TOPICAL DAILY PRN
COMMUNITY

## 2022-06-08 RX ORDER — PREDNISONE 1 MG/1
2 TABLET ORAL DAILY
COMMUNITY

## 2022-06-08 NOTE — PROGRESS NOTES
Message to [phone] at [facility] office that [address] [place] [address] states pt. cannot stop ASA or plavix for her bronchoscopy. She can have procedure if it can be done while she is taking ASA/plavix. Informed pt.of this and pt.states she stopped both meds-her last dose of each med. was on Sunday 6/5/22. Instructed pt.to call both offices to make them aware that she has stopped both meds-verbalized understanding.

## 2022-06-08 NOTE — TELEPHONE ENCOUNTER
Called pt and got voicemail LM that she should probably jacque back on the Plavix because it is our understanding that her Cardiologist wanted her to stay on it

## 2022-06-08 NOTE — TELEPHONE ENCOUNTER
Pre admission Scheduling Testing from [facility] needs to discuss the  medication that  Elvis Carrasco is currently  taking before surgery .  Elvis Carrasco surgery is Tomorrow

## 2022-06-08 NOTE — PROGRESS NOTES
Subjective:       Blanca Varma is a 68 y.o. female   The patient returns for follow-up of polymyalgia rheumatica. Her last CRP was elevated but she was asymptomatic. She is scheduled for bronchoscopy tomorrow because of some abnormal abnormal findings. Review of Systems:    Denies symptoms of giant cell arteritis denies symptoms of PMR    Objective:       /76   Pulse 76   Ht 5' 5\" (1.651 m)   Wt 181 lb (82.1 kg)   BMI 30.12 kg/m²   Alert female no acute distress temporal arteries 1+ without nodularity or tenderness  Assessment:    Polymyalgia rheumatica      Plan:      A CRP will be checked. She will be contacted by telephone regarding her prednisone dose. I will see her back in 6 weeks time.       Brian Ritter MD, MD, 6/8/2022 1:30 PM

## 2022-06-08 NOTE — TELEPHONE ENCOUNTER
Called PAT and spoke with Olinda and she was trying to get a hold of Cardiology for ok to stop her ASA and Plavix and told pt cannot stop these medications Per Dr Maddy An pt can have her bronch necause he is not doing any biopsy's pt and PAT informed

## 2022-06-09 ENCOUNTER — ANESTHESIA (OUTPATIENT)
Dept: ENDOSCOPY | Age: 77
End: 2022-06-09
Payer: MEDICARE

## 2022-06-09 ENCOUNTER — HOSPITAL ENCOUNTER (OUTPATIENT)
Age: 77
Setting detail: OUTPATIENT SURGERY
Discharge: HOME | End: 2022-06-09
Attending: INTERNAL MEDICINE | Admitting: INTERNAL MEDICINE
Payer: MEDICARE

## 2022-06-09 ENCOUNTER — ANESTHESIA EVENT (OUTPATIENT)
Dept: ENDOSCOPY | Age: 77
End: 2022-06-09
Payer: MEDICARE

## 2022-06-09 VITALS
HEIGHT: 64 IN | RESPIRATION RATE: 12 BRPM | DIASTOLIC BLOOD PRESSURE: 63 MMHG | WEIGHT: 183 LBS | BODY MASS INDEX: 31.24 KG/M2 | OXYGEN SATURATION: 92 % | SYSTOLIC BLOOD PRESSURE: 116 MMHG | HEART RATE: 96 BPM | TEMPERATURE: 96.8 F

## 2022-06-09 LAB
APTT: 27.2 SEC (ref 23–34.3)
BASOPHILS ABSOLUTE: 0.1 K/UL (ref 0–0.2)
BASOPHILS RELATIVE PERCENT: 0.7 %
EOSINOPHILS ABSOLUTE: 0.4 K/UL (ref 0–0.6)
EOSINOPHILS RELATIVE PERCENT: 4.6 %
GLUCOSE BLD-MCNC: 183 MG/DL (ref 70–99)
GLUCOSE BLD-MCNC: 186 MG/DL (ref 70–99)
HCT VFR BLD CALC: 36.3 % (ref 36–48)
HEMOGLOBIN: 11.8 G/DL (ref 12–16)
INR BLD: 1 (ref 0.87–1.14)
LYMPHOCYTES ABSOLUTE: 1.7 K/UL (ref 1–5.1)
LYMPHOCYTES RELATIVE PERCENT: 19.1 %
MCH RBC QN AUTO: 30.8 PG (ref 26–34)
MCHC RBC AUTO-ENTMCNC: 32.5 G/DL (ref 31–36)
MCV RBC AUTO: 94.9 FL (ref 80–100)
MONOCYTES ABSOLUTE: 0.6 K/UL (ref 0–1.3)
MONOCYTES RELATIVE PERCENT: 7.3 %
NEUTROPHILS ABSOLUTE: 6 K/UL (ref 1.7–7.7)
NEUTROPHILS RELATIVE PERCENT: 68.3 %
PDW BLD-RTO: 16.1 % (ref 12.4–15.4)
PERFORMED ON: ABNORMAL
PERFORMED ON: ABNORMAL
PLATELET # BLD: 287 K/UL (ref 135–450)
PMV BLD AUTO: 8.1 FL (ref 5–10.5)
PROTHROMBIN TIME: 13.2 SEC (ref 11.7–14.5)
RBC # BLD: 3.82 M/UL (ref 4–5.2)
WBC # BLD: 8.8 K/UL (ref 4–11)

## 2022-06-09 PROCEDURE — 6360000002 HC RX W HCPCS: Performed by: NURSE ANESTHETIST, CERTIFIED REGISTERED

## 2022-06-09 PROCEDURE — 87015 SPECIMEN INFECT AGNT CONCNTJ: CPT

## 2022-06-09 PROCEDURE — 87070 CULTURE OTHR SPECIMN AEROBIC: CPT

## 2022-06-09 PROCEDURE — 3700000001 HC ADD 15 MINUTES (ANESTHESIA): Performed by: INTERNAL MEDICINE

## 2022-06-09 PROCEDURE — 87205 SMEAR GRAM STAIN: CPT

## 2022-06-09 PROCEDURE — 7100000010 HC PHASE II RECOVERY - FIRST 15 MIN: Performed by: INTERNAL MEDICINE

## 2022-06-09 PROCEDURE — 87798 DETECT AGENT NOS DNA AMP: CPT

## 2022-06-09 PROCEDURE — 3700000000 HC ANESTHESIA ATTENDED CARE: Performed by: INTERNAL MEDICINE

## 2022-06-09 PROCEDURE — 87632 RESP VIRUS 6-11 TARGETS: CPT

## 2022-06-09 PROCEDURE — 87206 SMEAR FLUORESCENT/ACID STAI: CPT

## 2022-06-09 PROCEDURE — 87305 ASPERGILLUS AG IA: CPT

## 2022-06-09 PROCEDURE — 7100000011 HC PHASE II RECOVERY - ADDTL 15 MIN: Performed by: INTERNAL MEDICINE

## 2022-06-09 PROCEDURE — 87281 PNEUMOCYSTIS CARINII AG IF: CPT

## 2022-06-09 PROCEDURE — 87556 M.TUBERCULO DNA AMP PROBE: CPT

## 2022-06-09 PROCEDURE — 6370000000 HC RX 637 (ALT 250 FOR IP): Performed by: INTERNAL MEDICINE

## 2022-06-09 PROCEDURE — 87541 LEGION PNEUMO DNA AMP PROB: CPT

## 2022-06-09 PROCEDURE — 31624 DX BRONCHOSCOPE/LAVAGE: CPT | Performed by: INTERNAL MEDICINE

## 2022-06-09 PROCEDURE — 3609010800 HC BRONCHOSCOPY ALVEOLAR LAVAGE: Performed by: INTERNAL MEDICINE

## 2022-06-09 PROCEDURE — 2580000003 HC RX 258: Performed by: ANESTHESIOLOGY

## 2022-06-09 PROCEDURE — 88112 CYTOPATH CELL ENHANCE TECH: CPT

## 2022-06-09 PROCEDURE — 85025 COMPLETE CBC W/AUTO DIFF WBC: CPT

## 2022-06-09 PROCEDURE — 87581 M.PNEUMON DNA AMP PROBE: CPT

## 2022-06-09 PROCEDURE — 87106 FUNGI IDENTIFICATION YEAST: CPT

## 2022-06-09 PROCEDURE — 7100000001 HC PACU RECOVERY - ADDTL 15 MIN: Performed by: INTERNAL MEDICINE

## 2022-06-09 PROCEDURE — 85730 THROMBOPLASTIN TIME PARTIAL: CPT

## 2022-06-09 PROCEDURE — 87449 NOS EACH ORGANISM AG IA: CPT

## 2022-06-09 PROCEDURE — 2500000003 HC RX 250 WO HCPCS: Performed by: NURSE ANESTHETIST, CERTIFIED REGISTERED

## 2022-06-09 PROCEDURE — 85610 PROTHROMBIN TIME: CPT

## 2022-06-09 PROCEDURE — 2709999900 HC NON-CHARGEABLE SUPPLY: Performed by: INTERNAL MEDICINE

## 2022-06-09 PROCEDURE — 7100000000 HC PACU RECOVERY - FIRST 15 MIN: Performed by: INTERNAL MEDICINE

## 2022-06-09 PROCEDURE — 87102 FUNGUS ISOLATION CULTURE: CPT

## 2022-06-09 PROCEDURE — 88305 TISSUE EXAM BY PATHOLOGIST: CPT

## 2022-06-09 PROCEDURE — 36415 COLL VENOUS BLD VENIPUNCTURE: CPT

## 2022-06-09 PROCEDURE — 87116 MYCOBACTERIA CULTURE: CPT

## 2022-06-09 PROCEDURE — 87486 CHLMYD PNEUM DNA AMP PROBE: CPT

## 2022-06-09 RX ORDER — LIDOCAINE HYDROCHLORIDE 40 MG/ML
SOLUTION TOPICAL PRN
Status: DISCONTINUED | OUTPATIENT
Start: 2022-06-09 | End: 2022-06-09 | Stop reason: ALTCHOICE

## 2022-06-09 RX ORDER — PROPOFOL 10 MG/ML
INJECTION, EMULSION INTRAVENOUS PRN
Status: DISCONTINUED | OUTPATIENT
Start: 2022-06-09 | End: 2022-06-09 | Stop reason: SDUPTHER

## 2022-06-09 RX ORDER — KETAMINE HCL IN NACL, ISO-OSM 100MG/10ML
SYRINGE (ML) INJECTION PRN
Status: DISCONTINUED | OUTPATIENT
Start: 2022-06-09 | End: 2022-06-09 | Stop reason: SDUPTHER

## 2022-06-09 RX ORDER — PROPOFOL 10 MG/ML
INJECTION, EMULSION INTRAVENOUS CONTINUOUS PRN
Status: DISCONTINUED | OUTPATIENT
Start: 2022-06-09 | End: 2022-06-09 | Stop reason: SDUPTHER

## 2022-06-09 RX ORDER — GLYCOPYRROLATE 0.2 MG/ML
INJECTION INTRAMUSCULAR; INTRAVENOUS PRN
Status: DISCONTINUED | OUTPATIENT
Start: 2022-06-09 | End: 2022-06-09 | Stop reason: SDUPTHER

## 2022-06-09 RX ORDER — SODIUM CHLORIDE 9 MG/ML
INJECTION, SOLUTION INTRAVENOUS CONTINUOUS
Status: DISCONTINUED | OUTPATIENT
Start: 2022-06-09 | End: 2022-06-09 | Stop reason: HOSPADM

## 2022-06-09 RX ADMIN — SODIUM CHLORIDE: 9 INJECTION, SOLUTION INTRAVENOUS at 09:28

## 2022-06-09 RX ADMIN — GLYCOPYRROLATE 0.1 MG: 0.2 INJECTION INTRAMUSCULAR; INTRAVENOUS at 10:09

## 2022-06-09 RX ADMIN — PROPOFOL 150 MCG/KG/MIN: 10 INJECTION, EMULSION INTRAVENOUS at 10:11

## 2022-06-09 RX ADMIN — GLYCOPYRROLATE 0.1 MG: 0.2 INJECTION INTRAMUSCULAR; INTRAVENOUS at 10:12

## 2022-06-09 RX ADMIN — Medication 10 MG: at 10:16

## 2022-06-09 RX ADMIN — PROPOFOL 50 MG: 10 INJECTION, EMULSION INTRAVENOUS at 10:12

## 2022-06-09 RX ADMIN — Medication 20 MG: at 10:13

## 2022-06-09 ASSESSMENT — PAIN - FUNCTIONAL ASSESSMENT: PAIN_FUNCTIONAL_ASSESSMENT: 0-10

## 2022-06-09 ASSESSMENT — ENCOUNTER SYMPTOMS: SHORTNESS OF BREATH: 1

## 2022-06-09 NOTE — H&P
The patient was examined today and the H&P from the office was reviewed. There are no changes    Past Medical History:   Diagnosis Date    CAD (coronary artery disease)     Cancer ([facility] [address] 2011    basal cell skin    Cardiomyopathy ([facility] [address]     CHF (congestive heart failure) ([facility])     Hyperlipidemia     Hypertension     Hypothyroidism     NSTEMI (non-ST elevated myocardial infarction) ([facility] [address] 01/2022    Psoriasis        Past Surgical History:   Procedure Laterality Date    BLADDER REPAIR      HYSTERECTOMY (CERVIX STATUS UNKNOWN)      MALIGNANT SKIN LESION EXCISION      OTHER SURGICAL HISTORY  01/03/2022    PCI left circumflex with KYLAH    OTHER SURGICAL HISTORY  02/2020    LAD stent    SHOULDER SURGERY  03/2013       VS Reviewed  Chest Clear  Heart regular      Plan: Proceed as planned.  Bronch with BAL

## 2022-06-09 NOTE — PROCEDURES
mucosa   Left main bronchus: Normal mucosa   Left upper lobe bronchus: Normal mucosa   Left lower lobe bronchus: Normal mucosa      Copious thick mucus was suctioned from all segments but mostly right upper lobe and right middle lobe.     Right middle lobe was entered and a wedge position was obtained bronchoalveolar lavage with a total of 120 cc normal saline was instilled. 32 cc specimen collected.     The patient was taken to the endoscopy recovery area in satisfactory condition.      Recommendation:   1. F/U on culture results   2. F/U on cytology results      Attestation: I performed the procedure.   Micha Hall MD

## 2022-06-09 NOTE — PROGRESS NOTES
Pt arrived from endo to PACU bay 2. Report received from endo staff. Pt asleep, arousable to voice, arrives with simple mask in place infusing 8L oxygen. Pt arrives actively coughing.

## 2022-06-09 NOTE — ANESTHESIA PRE PROCEDURE
Department of Anesthesiology  Preprocedure Note       Name:  Gianfranco Galicia   Age:  68 y.o.  :  1945                                          MRN:  [id number]         Date:  2022      Surgeon: Denis Loya):  Fito Fields MD    Procedure: Procedure(s):  BRONCHOSCOPY ALVEOLAR LAVAGE    Medications prior to admission:   Prior to Admission medications    Medication Sig Start Date End Date Taking? Authorizing Provider   gabapentin (NEURONTIN) 100 MG capsule Take 100 mg by mouth 3 times daily. Yes Historical Provider, MD   methocarbamol (ROBAXIN) 500 MG tablet Take 500 mg by mouth as needed   Yes Historical Provider, MD   oxyCODONE-acetaminophen (PERCOCET) 7.5-325 MG per tablet Take 2 tablets by mouth in the morning and at bedtime. Yes Historical Provider, MD   metFORMIN (GLUCOPHAGE) 500 MG tablet Take 500 mg by mouth 2 times daily (with meals)   Yes Historical Provider, MD   fexofenadine (ALLEGRA) 180 MG tablet Take 180 mg by mouth daily   Yes Historical Provider, MD   albuterol sulfate HFA (VENTOLIN HFA) 108 (90 Base) MCG/ACT inhaler Inhale 2 puffs into the lungs every 6 hours as needed for Wheezing   Yes Historical Provider, MD   lidocaine (LIDODERM) 5 % Place 1 patch onto the skin daily 12 hours on, 12 hours off.     Historical Provider, MD   predniSONE (DELTASONE) 1 MG tablet Take 4 mg by mouth daily    Historical Provider, MD   meloxicam (MOBIC) 15 MG tablet TAKE 1 TABLET BY MOUTH  DAILY 22   Mary Martin MD   clopidogrel (PLAVIX) 75 MG tablet Take 75 mg by mouth daily 22   Historical Provider, MD   aspirin 81 MG chewable tablet Take 81 mg by mouth as needed Takes a few times weekly    Historical Provider, MD   furosemide (LASIX) 40 MG tablet TAKE ONE TABLET BY MOUTH DAILY AS NEEDED 11/3/21   Adam Mo MD   rosuvastatin (CRESTOR) 5 MG tablet TAKE 1 TABLET BY MOUTH  DAILY  Patient taking differently: Take 40 mg by mouth nightly  10/13/21   MD Margoth Hagan 62.5-25 MCG/INH AEPB inhaler USE 1 INHALATION BY MOUTH  DAILY  Patient taking differently: at bedtime  7/16/21   BRIANNE Palmer CNP   carvedilol (COREG) 25 MG tablet TAKE ONE-HALF TABLET BY  MOUTH TWICE DAILY 6/25/21   Howard Souza MD   losartan (COZAAR) 50 MG tablet TAKE 1 TABLET BY MOUTH  TWICE DAILY 6/25/21   Howard Souza MD   venlafaxine (EFFEXOR XR) 75 MG extended release capsule Take 75 mg by mouth daily    Historical Provider, MD   omeprazole (PRILOSEC) 20 MG delayed release capsule Take 1 capsule by mouth daily 1/23/20   BRIANNE Palmer CNP   acetaminophen (TYLENOL) 500 MG tablet Take 500 mg by mouth every 6 hours as needed for Pain    Historical Provider, MD   diclofenac sodium 1 % GEL Apply 4 g topically 4 times daily. 8/5/13   Man Erwin MD   Insulin Glargine (LANTUS SC) Inject 30 Units into the skin 2 times daily (before meals)     Historical Provider, MD   Insulin Aspart (NOVOLOG FLEXPEN SC) Inject 10 Units into the skin as needed 1-2 daily prn per pt    Historical Provider, MD   levothyroxine (SYNTHROID) 100 MCG tablet Take 75 mcg by mouth daily     Historical Provider, MD   calcium carbonate (OSCAL) 500 MG TABS tablet Take 500 mg by mouth daily. Historical Provider, MD   perphenazine 2 MG tablet Take 2 mg by mouth nightly. Historical Provider, MD       Current medications:    No current facility-administered medications for this encounter. Allergies:     Allergies   Allergen Reactions    No Known Allergies     Statins Other (See Comments)     Joint pain , muscle aches       Problem List:    Patient Active Problem List   Diagnosis Code    HTN (hypertension) I10    Hyperlipidemia E78.5    Cardiomyopathy ([facility] I42.9    CAD (coronary artery disease) I25.10    COPD, severe (HCC) J44.9    Psoriatic arthritis ([facility] L40.50    Diabetic peripheral neuropathy (HCC) E11.42    SOB (shortness of breath) R06.02    [facility] I20.0    Abnormal stress test R94.39    Bronchiectasis without complication (HCC) R43.5       Past Medical History:        Diagnosis Date    CAD (coronary artery disease)     Cancer ([facility]     basal cell skin    Cardiomyopathy ([facility]     CH[facility]congestive [facility] failure) [facility]     Hyperlipidemia     Hypertension     Hypothyroidism     NSTEMI (non-ST elevated myocardial infarction) ([facility] 2022    Psoriasis        Past Surgical History:        Procedure Laterality Date    BLADDER REPAIR      HYSTERECTOMY (CERVIX STATUS UNKNOWN)      MALIGNANT SKIN LESION EXCISION      OTHER SURGICAL HISTORY  2022    PCI left circumflex with KYLAH    OTHER SURGICAL HISTORY  2020    LAD stent    SHOULDER SURGERY  2013       Social History:    Social History     Tobacco Use    Smoking status: Former Smoker     Packs/day: 0.50     Years: 35.00     Pack years: 17.50     Types: Cigarettes     Quit date: 1991     Years since quittin.9    Smokeless tobacco: Never Used    Tobacco comment: started to smoke at 21 / smoked up to 0.5 ppd for 35 yrs off and  on /    Substance Use Topics    Alcohol use: Yes     Alcohol/week: 1.0 - 2.0 standard drink     Types: 1 - 2 Glasses of wine per week     Comment: once or twice a year                                 Counseling given: Not Answered  Comment: started to smoke at 20 / smoked up to 0.5 ppd for 35 yrs off and  on /       Vital Signs (Current):   Vitals:    22 1126   Weight: 183 lb (83 kg)   Height: 5' 4\" (1.626 m)                                              BP Readings from Last 3 Encounters:   22 118/76   22 102/60   22 112/70       NPO Status:                                                                                 BMI:   Wt Readings from Last 3 Encounters:   22 183 lb (83 kg)   22 181 lb (82.1 kg)   22 186 lb (84.4 kg)     Body mass index is 31.41 kg/m².     CBC:   Lab Results   Component Value Date    WBC 7.0 11/15/2021    RBC 3.72 11/15/2021    HGB 11.3 11/15/2021    HCT 36.1 11/15/2021    MCV 96.9 11/15/2021    RDW 14.4 11/15/2021     11/15/2021       CMP:   Lab Results   Component Value Date     11/15/2021    K 4.6 11/15/2021    CL 96 11/15/2021    CO2 27 11/15/2021    BUN 24 11/15/2021    CREATININE 0.7 11/15/2021    GFRAA >60 11/15/2021    GFRAA >60 01/09/2013    AGRATIO 1.0 07/13/2020    LABGLOM >60 11/15/2021    GLUCOSE 159 11/15/2021    PROT 6.4 06/30/2021    PROT 7.4 07/05/2011    CALCIUM 9.0 11/15/2021    BILITOT 0.4 06/30/2021    ALKPHOS 214 06/30/2021    AST 28 11/15/2021    ALT 17 11/15/2021       POC Tests: No results for input(s): POCGLU, POCNA, POCK, POCCL, POCBUN, POCHEMO, POCHCT in the last 72 hours. Coags: No results found for: PROTIME, INR, APTT    HCG (If Applicable): No results found for: PREGTESTUR, PREGSERUM, HCG, HCGQUANT     ABGs: No results found for: PHART, PO2ART, PLG5WAL, AKN5YWN, BEART, G0FRMEYN     Type & Screen (If Applicable):  No results found for: LABABO, LABRH    Drug/Infectious Status (If Applicable):  No results found for: HIV, HEPCAB    COVID-19 Screening (If Applicable):   Lab Results   Component Value Date    COVID19 Not Detected 11/02/2020           Anesthesia Evaluation  Patient summary reviewed and Nursing notes reviewed  Airway: Mallampati: II          Dental:          Pulmonary:   (+) COPD:  shortness of breath:                             Cardiovascular:  Exercise tolerance: poor (<4 METS),   (+) hypertension:, angina:, past MI:, CAD:, CHF:,                   Neuro/Psych:   (+) neuromuscular disease:,             GI/Hepatic/Renal:             Endo/Other:    (+) Diabetes, hypothyroidism::., .                 Abdominal:             Vascular:           Other Findings:           Anesthesia Plan      MAC     ASA 4                                   Ayana Childress MD   6/9/2022

## 2022-06-11 LAB
ASPERGILLUS GALACTO AG: NEGATIVE
ASPERGILLUS GALACTO INDEX: 0.1
CULTURE, RESPIRATORY: NORMAL
CULTURE, RESPIRATORY: NORMAL
GRAM STAIN RESULT: NORMAL
GRAM STAIN RESULT: NORMAL
PNEUMOCYSTIS JIROVECI DFA: NEGATIVE
PNEUMOCYSTIS SOURCE: NORMAL

## 2022-06-12 LAB
ADENOVIRUS PCR: NOT DETECTED
HUMAN METAPNEUMOVIRUS PCR: NOT DETECTED
INFLUENZA A: NOT DETECTED
INFLUENZA B: NOT DETECTED
PARAINFLUENZA 1 PCR: NOT DETECTED
PARAINFLUENZA 2 PCR: NOT DETECTED
PARAINFLUENZA 3 PCR: NOT DETECTED
PARAINFLUENZA 4 PCR: NOT DETECTED
RHINO/ENTEROVIRUS PCR: NOT DETECTED
RSV BY PCR: NOT DETECTED
RSV SOURCE: NORMAL

## 2022-06-13 LAB
CHLAMYDIA PNEUMONIAE BY PCR: NOT DETECTED
CHLAMYDIA PNEUMONIAE SOURCE: NORMAL

## 2022-06-14 ENCOUNTER — TELEPHONE (OUTPATIENT)
Dept: RHEUMATOLOGY | Age: 77
End: 2022-06-14

## 2022-06-14 ENCOUNTER — TELEPHONE (OUTPATIENT)
Dept: PULMONOLOGY | Age: 77
End: 2022-06-14

## 2022-06-14 LAB
CMV DNA QNT PCR: <2.6 LOG CPY/ML
CMV DNA QUANTATATIVE INTERPRETATION: <2.4 LOG IU/ML
CMV DNA QUANTATATIVE INTERPRETATION: NOT DETECTED
CMV DNA QUANTITATIVE: <227 IU/ML
CMV SOURCE: NORMAL
CMVQ COPY/ML: <390 CPY/ML
LEGIONELLA PNEUMOPHILIA BY PCR: NOT DETECTED
LEGIONELLA SOURCE: NORMAL
LEGIONELLA SPECIES PCR: NOT DETECTED

## 2022-06-14 NOTE — TELEPHONE ENCOUNTER
----- Message from Camilo Leal MD sent at 6/12/2022  3:02 PM EDT -----  Decrease patient'sd pred by 1mg/day

## 2022-06-14 NOTE — TELEPHONE ENCOUNTER
Patient's daughter Jakub Higgins called to say patient went to Urgent Care and tested positive for COVID. She has been coughing, tired achy, and stuffy. Urgent Care told her to let her pulmonologist know. Is there anything she should be doing? Please call patient at [phone]. If you don't reach patient call Liza at [phone].

## 2022-06-14 NOTE — TELEPHONE ENCOUNTER
Sx's started about a week ago and tested positive today after her  tested positive Her electric is also out  But has a generator so able to use nebulizer

## 2022-06-15 ENCOUNTER — PATIENT MESSAGE (OUTPATIENT)
Dept: PULMONOLOGY | Age: 77
End: 2022-06-15

## 2022-06-15 ENCOUNTER — TELEPHONE (OUTPATIENT)
Dept: PULMONOLOGY | Age: 77
End: 2022-06-15

## 2022-06-15 LAB
MYCOPLASMA PNEUMO SOURCE: NORMAL
MYCOPLASMA PNEUMONIAE PCR: NOT DETECTED
P JIROVECII BY PCR: NOT DETECTED

## 2022-06-15 NOTE — TELEPHONE ENCOUNTER
Spoke with patients daughter and she is not going to take the Paxlovid  and will go for her infusion tomorrow.

## 2022-06-15 NOTE — TELEPHONE ENCOUNTER
Ervin Sandra calls for patient. She states that Ramos Pulido had been given a med for Haritha, but that cardiologist recommended molnupiravir.      Liza can be reached at [phone]

## 2022-06-15 NOTE — TELEPHONE ENCOUNTER
Cardiologist at [facility] told pt she should not do the Paxlovid and recommended this other course of treatment Daughter informed that the Cardiologist will need to order that particular treatment then

## 2022-06-16 LAB
VARICELLA-ZOSTER, PCR: NOT DETECTED
VZ SOURCE: NORMAL

## 2022-06-17 LAB
COCCIDIOIDES AG EIA: NORMAL
COCCIDIOIDES AG-SOURCE: NORMAL

## 2022-06-18 LAB
MTB COMPLEX INTERP: NOT DETECTED
MTB RIFAMPIN BY PCR: NORMAL
MTBRIF SOURCE: NORMAL
MYCOBACTERIUM TUBERCULOSIS PCR: NOT DETECTED

## 2022-06-24 LAB — MISCELLANEOUS LAB TEST ORDER: NORMAL

## 2022-06-28 LAB
Lab: NORMAL
REPORT: NORMAL
THIS TEST SENT TO: NORMAL

## 2022-07-01 DIAGNOSIS — I10 ESSENTIAL HYPERTENSION: Chronic | ICD-10-CM

## 2022-07-01 DIAGNOSIS — I42.9 CARDIOMYOPATHY, UNSPECIFIED TYPE (HCC): ICD-10-CM

## 2022-07-01 DIAGNOSIS — I25.10 CORONARY ARTERY DISEASE INVOLVING NATIVE CORONARY ARTERY OF NATIVE HEART WITHOUT ANGINA PECTORIS: Chronic | ICD-10-CM

## 2022-07-07 RX ORDER — CARVEDILOL 25 MG/1
TABLET ORAL
Qty: 90 TABLET | Refills: 3 | OUTPATIENT
Start: 2022-07-07

## 2022-07-07 RX ORDER — LOSARTAN POTASSIUM 50 MG/1
TABLET ORAL
Qty: 180 TABLET | Refills: 3 | OUTPATIENT
Start: 2022-07-07

## 2022-07-08 LAB
AFB CULTURE (MYCOBACTERIA): ABNORMAL
AFB SMEAR: ABNORMAL
ORGANISM: ABNORMAL

## 2022-07-11 LAB
FUNGUS (MYCOLOGY) CULTURE: ABNORMAL
FUNGUS (MYCOLOGY) CULTURE: ABNORMAL
FUNGUS STAIN: ABNORMAL
FUNGUS STAIN: ABNORMAL
ORGANISM: ABNORMAL
ORGANISM: ABNORMAL

## 2022-07-21 ENCOUNTER — TELEPHONE (OUTPATIENT)
Dept: PULMONOLOGY | Age: 77
End: 2022-07-21

## 2022-07-21 RX ORDER — ALBUTEROL SULFATE 90 UG/1
2 AEROSOL, METERED RESPIRATORY (INHALATION) EVERY 6 HOURS PRN
Qty: 18 G | Refills: 3 | Status: SHIPPED | OUTPATIENT
Start: 2022-07-21

## 2022-07-21 NOTE — TELEPHONE ENCOUNTER
Pt called and said she needs a rescue inhaler sent to [facility] on [facility].     Ph # [phone]

## 2022-08-02 LAB
AFB CULTURE (MYCOBACTERIA): ABNORMAL
AFB SMEAR: ABNORMAL
ORGANISM: ABNORMAL

## 2022-08-03 ENCOUNTER — HOSPITAL ENCOUNTER (OUTPATIENT)
Age: 77
Discharge: HOME | End: 2022-08-03
Payer: MEDICARE

## 2022-08-03 ENCOUNTER — HOSPITAL ENCOUNTER (OUTPATIENT)
Dept: GENERAL RADIOLOGY | Age: 77
Discharge: HOME | End: 2022-08-03
Payer: MEDICARE

## 2022-08-03 ENCOUNTER — OFFICE VISIT (OUTPATIENT)
Dept: RHEUMATOLOGY | Age: 77
End: 2022-08-03
Payer: MEDICARE

## 2022-08-03 VITALS
SYSTOLIC BLOOD PRESSURE: 134 MMHG | HEIGHT: 65 IN | DIASTOLIC BLOOD PRESSURE: 76 MMHG | BODY MASS INDEX: 30.16 KG/M2 | WEIGHT: 181 LBS

## 2022-08-03 DIAGNOSIS — G89.29 HIP PAIN, CHRONIC, LEFT: ICD-10-CM

## 2022-08-03 DIAGNOSIS — M25.552 HIP PAIN, CHRONIC, LEFT: ICD-10-CM

## 2022-08-03 DIAGNOSIS — M35.3 PMR (POLYMYALGIA RHEUMATICA) (HCC): Primary | ICD-10-CM

## 2022-08-03 DIAGNOSIS — M35.3 PMR (POLYMYALGIA RHEUMATICA) (HCC): ICD-10-CM

## 2022-08-03 LAB — C-REACTIVE PROTEIN: 7.4 MG/L (ref 0–5.1)

## 2022-08-03 PROCEDURE — G8417 CALC BMI ABV UP PARAM F/U: HCPCS | Performed by: INTERNAL MEDICINE

## 2022-08-03 PROCEDURE — 1123F ACP DISCUSS/DSCN MKR DOCD: CPT | Performed by: INTERNAL MEDICINE

## 2022-08-03 PROCEDURE — 99213 OFFICE O/P EST LOW 20 MIN: CPT | Performed by: INTERNAL MEDICINE

## 2022-08-03 PROCEDURE — 1090F PRES/ABSN URINE INCON ASSESS: CPT | Performed by: INTERNAL MEDICINE

## 2022-08-03 PROCEDURE — G8399 PT W/DXA RESULTS DOCUMENT: HCPCS | Performed by: INTERNAL MEDICINE

## 2022-08-03 PROCEDURE — 36415 COLL VENOUS BLD VENIPUNCTURE: CPT

## 2022-08-03 PROCEDURE — G8427 DOCREV CUR MEDS BY ELIG CLIN: HCPCS | Performed by: INTERNAL MEDICINE

## 2022-08-03 PROCEDURE — 86140 C-REACTIVE PROTEIN: CPT

## 2022-08-03 PROCEDURE — 73502 X-RAY EXAM HIP UNI 2-3 VIEWS: CPT

## 2022-08-03 PROCEDURE — 1036F TOBACCO NON-USER: CPT | Performed by: INTERNAL MEDICINE

## 2022-08-03 NOTE — PROGRESS NOTES
Subjective:       Jerel Duncan is a 68 y.o. female the patient returns for follow-up of polymyalgia rheumatica. Her last CRP was 4.4. She is currently on prednisone 3 mg a day. Her main complaint today is left hip pain by which she means buttock or trochanteric area    Review of Systems:    Denies symptoms of polymyalgia rheumatica denies symptoms of giant cell arteritis    Objective:     /76   Ht 5' 5\" (1.651 m)   Wt 181 lb (82.1 kg)   BMI 30.12 kg/m²   Alert female in no acute distress. Temporal arteries 1+ without nodularity or tenderness. Left hip decreased external rotation tenderness over the left trochanteric bursa area and also some tenderness in the buttock region. Assessment:        Polymyalgia rheumatica. Left hip pain  Plan:      The patient will have a CRP drawn today. If normal prednisone will be reduced. She will also go on to have an x-ray and depending on the results she may be offered an injection of her trochanteric bursa. I will see patient back in 6 weeks time. Patient will be contacted once the hip x-ray results are available.         Capo Conroy MD, MD, 8/3/2022 2:13 PM

## 2022-08-04 ENCOUNTER — TELEPHONE (OUTPATIENT)
Dept: PULMONOLOGY | Age: 77
End: 2022-08-04

## 2022-08-04 DIAGNOSIS — A31.0 MYCOBACTERIUM AVIUM COMPLEX (HCC): Primary | ICD-10-CM

## 2022-08-04 NOTE — TELEPHONE ENCOUNTER
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

## 2022-08-09 ENCOUNTER — TELEPHONE (OUTPATIENT)
Dept: RHEUMATOLOGY | Age: 77
End: 2022-08-09

## 2022-08-09 ENCOUNTER — OFFICE VISIT (OUTPATIENT)
Dept: INFECTIOUS DISEASES | Age: 77
End: 2022-08-09
Payer: MEDICARE

## 2022-08-09 VITALS
HEART RATE: 76 BPM | BODY MASS INDEX: 31.07 KG/M2 | SYSTOLIC BLOOD PRESSURE: 97 MMHG | WEIGHT: 182 LBS | DIASTOLIC BLOOD PRESSURE: 59 MMHG | HEIGHT: 64 IN

## 2022-08-09 DIAGNOSIS — I10 PRIMARY HYPERTENSION: ICD-10-CM

## 2022-08-09 DIAGNOSIS — A31.0 MAI (MYCOBACTERIUM AVIUM-INTRACELLULARE) (HCC): ICD-10-CM

## 2022-08-09 DIAGNOSIS — A31.0 MAI (MYCOBACTERIUM AVIUM-INTRACELLULARE) (HCC): Primary | ICD-10-CM

## 2022-08-09 DIAGNOSIS — I42.9 CARDIOMYOPATHY, UNSPECIFIED TYPE (HCC): ICD-10-CM

## 2022-08-09 DIAGNOSIS — E11.42 DIABETIC PERIPHERAL NEUROPATHY (HCC): ICD-10-CM

## 2022-08-09 DIAGNOSIS — J47.9 BRONCHIECTASIS WITHOUT COMPLICATION (HCC): ICD-10-CM

## 2022-08-09 DIAGNOSIS — L40.50 PSORIATIC ARTHRITIS (HCC): ICD-10-CM

## 2022-08-09 DIAGNOSIS — E78.2 MIXED HYPERLIPIDEMIA: ICD-10-CM

## 2022-08-09 DIAGNOSIS — R94.39 ABNORMAL STRESS TEST: ICD-10-CM

## 2022-08-09 DIAGNOSIS — R06.02 SOB (SHORTNESS OF BREATH): ICD-10-CM

## 2022-08-09 DIAGNOSIS — J44.9 COPD, SEVERE (HCC): ICD-10-CM

## 2022-08-09 DIAGNOSIS — I25.10 CORONARY ARTERY DISEASE INVOLVING NATIVE CORONARY ARTERY OF NATIVE HEART WITHOUT ANGINA PECTORIS: ICD-10-CM

## 2022-08-09 LAB
A/G RATIO: 1.4 (ref 1.1–2.2)
ALBUMIN SERPL-MCNC: 3.4 G/DL (ref 3.4–5)
ALP BLD-CCNC: 209 U/L (ref 40–129)
ALT SERPL-CCNC: 26 U/L (ref 10–40)
ANION GAP SERPL CALCULATED.3IONS-SCNC: 10 MMOL/L (ref 3–16)
AST SERPL-CCNC: 46 U/L (ref 15–37)
BASOPHILS ABSOLUTE: 0 K/UL (ref 0–0.2)
BASOPHILS RELATIVE PERCENT: 0.4 %
BILIRUB SERPL-MCNC: 0.5 MG/DL (ref 0–1)
BUN BLDV-MCNC: 13 MG/DL (ref 7–20)
CALCIUM SERPL-MCNC: 9.2 MG/DL (ref 8.3–10.6)
CHLORIDE BLD-SCNC: 99 MMOL/L (ref 99–110)
CO2: 27 MMOL/L (ref 21–32)
CREAT SERPL-MCNC: 0.9 MG/DL (ref 0.6–1.2)
EOSINOPHILS ABSOLUTE: 0.4 K/UL (ref 0–0.6)
EOSINOPHILS RELATIVE PERCENT: 4.9 %
GFR AFRICAN AMERICAN: >60
GFR NON-AFRICAN AMERICAN: >60
GLUCOSE BLD-MCNC: 225 MG/DL (ref 70–99)
HCT VFR BLD CALC: 33.7 % (ref 36–48)
HEMOGLOBIN: 10.6 G/DL (ref 12–16)
LYMPHOCYTES ABSOLUTE: 1 K/UL (ref 1–5.1)
LYMPHOCYTES RELATIVE PERCENT: 13.2 %
MCH RBC QN AUTO: 31.2 PG (ref 26–34)
MCHC RBC AUTO-ENTMCNC: 31.5 G/DL (ref 31–36)
MCV RBC AUTO: 99 FL (ref 80–100)
MONOCYTES ABSOLUTE: 0.5 K/UL (ref 0–1.3)
MONOCYTES RELATIVE PERCENT: 6.5 %
NEUTROPHILS ABSOLUTE: 5.4 K/UL (ref 1.7–7.7)
NEUTROPHILS RELATIVE PERCENT: 75 %
PDW BLD-RTO: 17.1 % (ref 12.4–15.4)
PLATELET # BLD: 230 K/UL (ref 135–450)
PMV BLD AUTO: 7.6 FL (ref 5–10.5)
POTASSIUM SERPL-SCNC: 5.1 MMOL/L (ref 3.5–5.1)
RBC # BLD: 3.41 M/UL (ref 4–5.2)
SODIUM BLD-SCNC: 136 MMOL/L (ref 136–145)
TOTAL PROTEIN: 5.9 G/DL (ref 6.4–8.2)
WBC # BLD: 7.2 K/UL (ref 4–11)

## 2022-08-09 PROCEDURE — 1036F TOBACCO NON-USER: CPT | Performed by: INTERNAL MEDICINE

## 2022-08-09 PROCEDURE — G8399 PT W/DXA RESULTS DOCUMENT: HCPCS | Performed by: INTERNAL MEDICINE

## 2022-08-09 PROCEDURE — G8417 CALC BMI ABV UP PARAM F/U: HCPCS | Performed by: INTERNAL MEDICINE

## 2022-08-09 PROCEDURE — 99205 OFFICE O/P NEW HI 60 MIN: CPT | Performed by: INTERNAL MEDICINE

## 2022-08-09 PROCEDURE — 3023F SPIROM DOC REV: CPT | Performed by: INTERNAL MEDICINE

## 2022-08-09 PROCEDURE — 1123F ACP DISCUSS/DSCN MKR DOCD: CPT | Performed by: INTERNAL MEDICINE

## 2022-08-09 PROCEDURE — 1090F PRES/ABSN URINE INCON ASSESS: CPT | Performed by: INTERNAL MEDICINE

## 2022-08-09 PROCEDURE — G8427 DOCREV CUR MEDS BY ELIG CLIN: HCPCS | Performed by: INTERNAL MEDICINE

## 2022-08-09 RX ORDER — GREEN TEA/HOODIA GORDONII 315-12.5MG
1 CAPSULE ORAL 2 TIMES DAILY
Qty: 60 TABLET | Refills: 2 | Status: SHIPPED | OUTPATIENT
Start: 2022-08-09 | End: 2022-11-07

## 2022-08-09 RX ORDER — ETHAMBUTOL HYDROCHLORIDE 400 MG/1
1200 TABLET, FILM COATED ORAL DAILY
Qty: 90 TABLET | Refills: 2 | Status: SHIPPED | OUTPATIENT
Start: 2022-08-09 | End: 2022-11-07

## 2022-08-09 RX ORDER — AZITHROMYCIN 500 MG/1
500 TABLET, FILM COATED ORAL DAILY
Qty: 30 TABLET | Refills: 2 | Status: SHIPPED | OUTPATIENT
Start: 2022-08-09 | End: 2022-11-07

## 2022-08-09 ASSESSMENT — ENCOUNTER SYMPTOMS
COUGH: 1
ABDOMINAL PAIN: 0
SINUS PAIN: 0
EYE REDNESS: 0
CONSTIPATION: 0
SHORTNESS OF BREATH: 1
SINUS PRESSURE: 0
NAUSEA: 0
EYE DISCHARGE: 0
BACK PAIN: 0
WHEEZING: 0
RHINORRHEA: 0
SORE THROAT: 0
DIARRHEA: 0

## 2022-08-09 NOTE — TELEPHONE ENCOUNTER
Advised patient to see her orthopedic surgeon regarding her left hip.   She will reduce prednisone to 2 mg a day and I will see her back as scheduled follow-up

## 2022-08-09 NOTE — TELEPHONE ENCOUNTER
Pt is requesting her labs and xray results from 8/3/22.   Please call her - Home [phone] and mobile [phone]

## 2022-08-09 NOTE — PROGRESS NOTES
Infectious Diseases Clinic NewPatient Note    Reason for Visit:               Mycobacterium avium lung infection  Primary Care Physician:  Gisele De La Cruz MD  History Obtained From:   Patient , Medical Records     CHIEF COMPLAINT:    Chief Complaint   Patient presents with    New Patient     Mycobacterium avium complex, lungs -nk       HISTORY OF PRESENT ILLNESS: Nisa Aaron is a 68 y.o. pleasant female patient, who had a clinic visit with me today as a new patient. History was obtained from chart review and the patient. The patient had a physical clinic visit with me today for above mentioned complaints. The patient has severe COPD and follows up with Dr. Jaycob Wang with pulmonology. The patient was having increasing shortness of breath and coughing issues and had a CT scan of the chest done by Dr. Cox William couple of months ago which showed scattered tree-in-bud and nodular infiltrate that was worsening in comparison to prior CT scans. Retroglandular did a bronchoscopy with BAL on 6/9/2022. The patient was found to have multifocal pneumonia with mucous plugging. The BAL cultures came back positive for Mycobacterium avium complex    The patient was referred to me and was seen today in the clinic as a new patient    Past Medical History:   Past medical  history wasreviewed by me during this visit in detail. 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      Past Surgical History:   Procedure Laterality Date    BLADDER REPAIR      BRONCHOSCOPY N/A 6/9/2022    BRONCHOSCOPY ALVEOLAR LAVAGE performed by Mauri Raphael MD at [facility]    HYSTERECTOMY (CERVIX STATUS UNKNOWN)      MALIGNANT SKIN LESION EXCISION      OTHER SURGICAL HISTORY  01/03/2022    PCI left circumflex with KYLAH    OTHER SURGICAL HISTORY  02/2020    LAD stent    SHOULDER SURGERY  03/2013       Current Medications: All medicationswere reviewed by me during this visit  Current Outpatient Medications   Medication Sig Dispense Refill    rifAMPin (RIFADIN) 300 MG capsule Take 1 capsule by mouth in the morning and 1 capsule before bedtime. 60 capsule 2    ethambutol (MYAMBUTOL) 400 MG tablet Take 3 tablets by mouth in the morning. 90 tablet 2    azithromycin (ZITHROMAX) 500 MG tablet Take 1 tablet by mouth in the morning. 30 tablet 2    Probiotic Acidophilus (FLORANEX) TABS Take 1 tablet by mouth in the morning and 1 tablet before bedtime. 60 tablet 2    albuterol sulfate HFA (PROVENTIL HFA) 108 (90 Base) MCG/ACT inhaler Inhale 2 puffs into the lungs every 6 hours as needed for Wheezing 18 g 3    lidocaine (LIDODERM) 5 % Place 1 patch onto the skin daily 12 hours on, 12 hours off.      predniSONE (DELTASONE) 1 MG tablet Take 3 mg by mouth daily       gabapentin (NEURONTIN) 100 MG capsule Take 100 mg by mouth 3 times daily. methocarbamol (ROBAXIN) 500 MG tablet Take 500 mg by mouth as needed      oxyCODONE-acetaminophen (PERCOCET) 7.5-325 MG per tablet Take 2 tablets by mouth in the morning and at bedtime.       metFORMIN (GLUCOPHAGE) 500 MG tablet Take 500 mg by mouth 2 times daily (with meals)      fexofenadine (ALLEGRA) 180 MG tablet Take 180 mg by mouth daily      albuterol sulfate HFA (PROVENTIL;VENTOLIN;PROAIR) 108 (90 Base) MCG/ACT inhaler Inhale 2 puffs into the lungs every 6 hours as needed for Wheezing      meloxicam (MOBIC) 15 MG tablet TAKE 1 TABLET BY MOUTH  DAILY 90 tablet 3    clopidogrel (PLAVIX) 75 MG tablet Take 75 mg by mouth daily      aspirin 81 MG chewable tablet Take 81 mg by mouth as needed Takes a few times weekly      furosemide (LASIX) 40 MG tablet TAKE ONE TABLET BY MOUTH DAILY AS NEEDED 90 tablet 1    rosuvastatin (CRESTOR) 5 MG tablet TAKE 1 TABLET BY MOUTH  DAILY (Patient taking differently: Take 40 mg by mouth nightly) 90 tablet 3    ANORO ELLIPTA 62.5-25 MCG/INH AEPB inhaler USE 1 INHALATION BY MOUTH  DAILY (Patient taking differently: at bedtime) 3 each 3    carvedilol (COREG) 25 MG tablet TAKE ONE-HALF TABLET BY  MOUTH TWICE DAILY 90 tablet 3    losartan (COZAAR) 50 MG tablet TAKE 1 TABLET BY MOUTH  TWICE DAILY 180 tablet 3    venlafaxine (EFFEXOR XR) 75 MG extended release capsule Take 75 mg by mouth daily      omeprazole (PRILOSEC) 20 MG delayed release capsule Take 1 capsule by mouth daily 30 capsule 3    acetaminophen (TYLENOL) 500 MG tablet Take 500 mg by mouth every 6 hours as needed for Pain      diclofenac sodium 1 % GEL Apply 4 g topically 4 times daily. 3 Tube 3    Insulin Glargine (LANTUS SC) Inject 30 Units into the skin 2 times daily (before meals)       Insulin Aspart (NOVOLOG FLEXPEN SC) Inject 10 Units into the skin as needed 1-2 daily prn per pt      levothyroxine (SYNTHROID) 100 MCG tablet Take 75 mcg by mouth daily       calcium carbonate (OSCAL) 500 MG TABS tablet Take 500 mg by mouth daily. perphenazine 2 MG tablet Take 2 mg by mouth nightly. No current facility-administered medications for this visit. Family history: All family history was reviewed by me today    Family History   Problem Relation Age of Onset    Cancer Mother     Arthritis Sister         rheumatoid    Heart Disease Maternal Grandmother     Heart Disease Paternal Grandmother        No family history of immunocompromising or autoimmune conditions. No h/o TBin in family or contacts      REVIEW OF SYSTEMS:      Review of Systems   Constitutional:  Positive for fatigue. Negative for chills, diaphoresis and fever. HENT:  Negative for ear discharge, ear pain, postnasal drip, rhinorrhea, sinus pressure, sinus pain and sore throat. Eyes:  Negative for discharge and redness. Respiratory:  Positive for cough and shortness of breath. Negative for wheezing. Cardiovascular:  Negative for chest pain and leg swelling. Gastrointestinal:  Negative for abdominal pain, constipation, diarrhea and nausea. Endocrine: Negative for cold intolerance, heat intolerance and polydipsia. Genitourinary:  Negative for dysuria, flank pain, frequency, hematuria and urgency. Musculoskeletal:  Negative for back pain and myalgias. Skin:  Negative for rash. Allergic/Immunologic: Negative for immunocompromised state. Neurological:  Negative for dizziness, seizures and headaches. Hematological:  Does not bruise/bleed easily. Psychiatric/Behavioral:  Negative for agitation, hallucinations and suicidal ideas. The patient is not nervous/anxious. All other systems reviewed and are negative. PHYSICAL EXAM:      Vitals:    08/09/22 1057   BP: (!) 97/59   Pulse: 76       Physical Exam  Vitals and nursing note reviewed. Constitutional:       Appearance: She is well-developed. She is not diaphoretic. Comments: The patient was seen earlier today. HENT:      Head: Normocephalic and atraumatic. Right Ear: External ear normal. There is no impacted cerumen. Left Ear: External ear normal. There is no impacted cerumen. Nose: Nose normal.      Mouth/Throat:      Mouth: Mucous membranes are moist.      Pharynx: Oropharynx is clear. No oropharyngeal exudate. Eyes:      General: No scleral icterus. Right eye: No discharge. Left eye: No discharge. Conjunctiva/sclera: Conjunctivae normal.      Pupils: Pupils are equal, round, and reactive to light. Neck:      Thyroid: No thyromegaly. Cardiovascular:      Rate and Rhythm: Normal rate and regular rhythm. Heart sounds: Normal heart sounds. No murmur heard. No friction rub. Pulmonary:      Effort: No respiratory distress. Breath sounds: No stridor. Rales (Bilateral patchy coarse crackles) present. No wheezing. Abdominal:      General: Bowel sounds are normal.      Palpations: Abdomen is soft. Tenderness: no abdominal tenderness There is no guarding or rebound. Musculoskeletal:         General: No swelling, tenderness or deformity. Normal range of motion. Cervical back: Normal range of motion and neck supple. Right lower leg: No edema. Left lower leg: No edema. Lymphadenopathy:      Cervical: No cervical adenopathy. Skin:     General: Skin is warm and dry. Coloration: Skin is not jaundiced. Findings: No bruising, erythema or rash. Neurological:      General: No focal deficit present. Mental Status: She is alert and oriented to person, place, and time. Mental status is at baseline. Motor: No abnormal muscle tone.    Psychiatric:         Mood and Affect: Mood normal.         Behavior: Behavior normal.             DATA:  All available lab data was reviewed by me during this visit    Last CBC:  Lab Results   Component Value Date    WBC 7.2 08/09/2022    HGB 10.6 (L) 08/09/2022    HCT 33.7 (L) 08/09/2022    MCV 99.0 08/09/2022     08/09/2022    LYMPHOPCT 13.2 08/09/2022    RBC 3.41 (L) 08/09/2022    MCH 31.2 08/09/2022    MCHC 31.5 08/09/2022    RDW 17.1 (H) 08/09/2022       Last BMP:  Lab Results   Component Value Date/Time     11/15/2021 10:06 AM    K 4.6 11/15/2021 10:06 AM    CL 96 11/15/2021 10:06 AM    CO2 27 11/15/2021 10:06 AM    BUN 24 11/15/2021 10:06 AM    CREATININE 0.7 11/15/2021 10:06 AM    GLUCOSE 159 11/15/2021 10:06 AM    CALCIUM 9.0 11/15/2021 10:06 AM        Hepatic Function Panel:   Lab Results   Component Value Date/Time    ALKPHOS 214 06/30/2021 01:41 PM    ALT 17 11/15/2021 10:06 AM    AST 28 11/15/2021 10:06 AM    PROT 6.4 06/30/2021 01:41 PM    PROT 7.4 07/05/2011 01:45 AM    BILITOT 0.4 06/30/2021 01:41 PM    BILIDIR <0.2 06/30/2021 01:41 PM    IBILI see below 06/30/2021 01:41 PM me in detail today. Social History     Socioeconomic History    Marital status:    Tobacco Use    Smoking status: Former     Packs/day: 0.50     Years: 35.00     Pack years: 17.50     Types: Cigarettes     Quit date: 1991     Years since quittin.1    Smokeless tobacco: Never    Tobacco comments:     started to smoke at 21 / smoked up to 0.5 ppd for 35 yrs off and  on /    Vaping Use    Vaping Use: Never used   Substance and Sexual Activity    Alcohol use: Yes     Alcohol/week: 1.0 - 2.0 standard drink     Types: 1 - 2 Glasses of wine per week     Comment: once or twice a year     Drug use: No    Sexual activity: Yes     Partners: Male       COVID VACCINATION AND LAB RESULT RECORDS:     Internal Administration   First Dose COVID-19, MODERNA BLUE border, Primary or Immunocompromised, (age 12y+), IM, 100 mcg/0.5mL  2021   Second Dose COVID-19, MODERNA BLUE border, Primary or Immunocompromised, (age 12y+), IM, 100 mcg/0.5mL   2021       Last COVID Lab SARS-CoV-2 (no units)   Date Value   2020 Not Detected            IMPRESSION:    The patient is a 68 y. o.old female who  has a past medical history of CAD (coronary artery disease), Cancer ([facility] [address] (), Cardiomyopathy ([address] CHF (congestive heart failure) ([address] Hyperlipidemia, Hypertension, Hypothyroidism, NSTEMI (non-ST elevated myocardial infarction) ([address] (2022), and Psoriasis. was seen today for following problems:    1. RADHA (mycobacterium avium-intracellulare) ([facility]    2. Cardiomyopathy, unspecified type ([address]    3. COPD, severe ([address]    4. Coronary artery disease involving native coronary artery of native heart without angina pectoris    5. Diabetic peripheral neuropathy ([address]    6. SOB (shortness of breath)    7. Abnormal stress test    8. Bronchiectasis without complication ([address]    9. Primary hypertension    10. Mixed hyperlipidemia    11.  Psoriatic arthritis ([address]        Discussion:      The patient had a bronchoscopy done on 6/9/2022 by Dr. Bebo Wilkes. I reviewed the bronchoscopy note in detail. Thick copious mucus was suctioned from all segments of the lungs, mostly from right upper lobe and right middle lobe. Right middle lobe bronchoalveolar lavage was done and the BAL culture has come back positive for Mycobacterium avium intracellulare. The BAL culture also grew Candida albicans, which likely represents normal respiratory alana that does not need coverage. I reviewed the images of CT scan of her chest that was done on 6/9/2022 and independently interpreted the results. The patient had worsening nodular densities and bronchiectasis changes involving both lungs. Since the patient had worsening respiratory symptoms, and has CT scan changes in the lungs and has microbiological evidence of Mycobacterium avium intracellular, she would meet the ATS/IDSA criteria for treatment for Mycobacterium avium lung infection. Her last CBC was done on 6/9/2022. Her white cell count was 8800, hemoglobin was 11.8 and platelet count was [phone] at that time. No recent CMP available    RECOMMENDATIONS:      Diagnostic Workup: Will order CBC and CMP to be done now and then every 2 months  Continue to follow fever curve at home. I have requested the microbiology lab to fax me the sensitivity lab results as I am unable to access the sensitivity data to Mycobacterium avium complex via epic, unfortunately. Antimicrobials:     Will order oral rifampin 300 mg twice daily for the patient  Will order oral ethambutol 1200 mg daily for the patient  Will order oral azithromycin 500 mg daily for the patient for Mycobacterium avium infection  I have ordered above antibiotics for 3 months for the patient  Discussed the importance of antibiotic compliance  Recommend taking oral probiotic twice daily  Cough and deep breathing exercises  Continue to watch for any new fevers or diarrhea  Will plan to reduce antibiotic frequency to 3 times a week, if patient shows significant improvement after 3 months of above antibiotic therapy  Discussed with patient the strategies to stay safe from COVID 19 exposures including safe social distancing, frequent and proper hand hygiene when outside and using 3 layered mouth and nose coverings/facemasks when outside their home. Labs ordered today inEPIC:    Orders Placed This Encounter   Procedures    CBC with Auto Differential     Standing Status:   Standing     Number of Occurrences:   4     Standing Expiration Date:   8/9/2023    Comprehensive Metabolic Panel     Standing Status:   Standing     Number of Occurrences:   4     Standing Expiration Date:   8/9/2023       Medications ordered today in EPIC:    Orders Placed This Encounter   Medications    rifAMPin (RIFADIN) 300 MG capsule     Sig: Take 1 capsule by mouth in the morning and 1 capsule before bedtime. Dispense:  60 capsule     Refill:  2    ethambutol (MYAMBUTOL) 400 MG tablet     Sig: Take 3 tablets by mouth in the morning. Dispense:  90 tablet     Refill:  2    azithromycin (ZITHROMAX) 500 MG tablet     Sig: Take 1 tablet by mouth in the morning. Dispense:  30 tablet     Refill:  2    Probiotic Acidophilus (FLORANEX) TABS     Sig: Take 1 tablet by mouth in the morning and 1 tablet before bedtime. Dispense:  60 tablet     Refill:  2       Drug Monitoring:    Monitor for antibiotic toxicity as follows: CBC, CMP, ESR, CRP now and then every 2 months    Fax above results to [phone]. Patient education and counseling: The patient was educated in detail about the side-effects of variousantibiotics and things to watch for like new rashes, lip swelling, severe reaction, worsening diarrhea, break through fever etc.  Patient was instructed to stop antibiotics and callour office if these problems were to occur, or go to nearest ER if experiencing severe symptoms. Discussed patient's condition and what to expect.  All of the patient's questions wereaddressed in a satisfactory manner and patient verbalized understanding all instructions. Rifamycins related instructions : The patient  is on a Rifamycin (Rifampin/Rifabutin/Rifapentine) based regimen. These drugs are potent inducers of Cytochrome P450 system and  can DECREASE the levels of multiple drugs in the body including ketoconazole and itraconazole, anticonvulsants, statins, cyclosporine, tacrolimus, digoxin, blood thinners like warfarin, benzodiazepines, ergotamines, steroids like methylprednisolone, Kamran's Wort as well as certain HIV medication classes including NNRTIs and fusion inhibitors (maraviroc). So, dosage adjustements in these medications may be needed if patient is on them. Also, Rifampin will change the color of urine and body secretions including tears and sweat to orange. If patient wears contact lens, they can get stained, so patient should rather wear eye glasses while on Rifampin. Routine lab monitoring for liver functions will be done. Level of complexity of visit: High     Risk of Complications/Morbidity: High     Illness(es)/ Infection present that pose threat to bodily function. There is potential for severe exacerbation of infection/side effects of treatment. Therapy requires intensive monitoring for antimicrobial agent toxicity. Follow-up:    Follow-up with me in ID clinic or through video visit in 3 months        An electronic signature was used to authenticate this note. TIME SPENTTODAY:     - Spent over 62 minutes on visit (including interval history, physical exam, review of data including labs, cultures, imaging, development and implementation of treatmentplan and coordination of care). - Over 50% of time spent with pt counseling and education. Please note that this chart was generated using Dragon dictation software.  Although every effort was made to ensure the accuracy of this automated transcription, some errors in transcription may have occurred inadvertently. If you may need any clarification, please do not hesitate to contact me through EPIC or at the phone number provided below with my electronic signature. Any pictures or media included in this note were obtained after taking informed verbal consent from the patient and with their approval to include those in the patient's medical record. Thankyou for involving me in the care of your patient. If you have any additional questions,please do not hesitate to contact me.       Skyler Mcmanus MD, MPH, [address] [place] [address]  8/9/2022, 9:48 PM  [facility] Infectious Disease   [address] [place] [address], [address] [place] [address]  Office: [phone]  Fax: [phone]  Clinic days:  Tuesday & Thursday

## 2022-08-18 ENCOUNTER — TELEPHONE (OUTPATIENT)
Dept: INFECTIOUS DISEASES | Age: 77
End: 2022-08-18

## 2022-08-18 DIAGNOSIS — T36.95XA ANTIBIOTIC-ASSOCIATED DIARRHEA: Primary | ICD-10-CM

## 2022-08-18 DIAGNOSIS — K52.1 ANTIBIOTIC-ASSOCIATED DIARRHEA: Primary | ICD-10-CM

## 2022-08-18 NOTE — TELEPHONE ENCOUNTER
Almost all of the antibiotics that I could give her for Mycobacterium avium would have a tendency to give diarrhea. I think at this time it is important to rule out C. difficile. Ordering stool C. difficile test.  Please advise her to get it done this week.

## 2022-08-18 NOTE — TELEPHONE ENCOUNTER
Patient called in stating she is having diarrhea despite taking probiotic, nauseous, not sleeping, no appetite. Patient states she stopped taking rifampin, ethambutol, azithromycin. She is wondering if she can take something else since she I snot tolerating this regimen?

## 2022-08-19 NOTE — TELEPHONE ENCOUNTER
Spoke with patient and advised of MD note and she verbalizes understanding.  States she will have her  go to the lab to get collection containers today and contact office next week to discuss results and POC

## 2022-08-22 ENCOUNTER — TELEPHONE (OUTPATIENT)
Dept: INFECTIOUS DISEASES | Age: 77
End: 2022-08-22

## 2022-09-13 ENCOUNTER — OFFICE VISIT (OUTPATIENT)
Dept: RHEUMATOLOGY | Age: 77
End: 2022-09-13
Payer: MEDICARE

## 2022-09-13 VITALS
HEIGHT: 64 IN | HEART RATE: 80 BPM | BODY MASS INDEX: 31.24 KG/M2 | SYSTOLIC BLOOD PRESSURE: 120 MMHG | DIASTOLIC BLOOD PRESSURE: 70 MMHG

## 2022-09-13 DIAGNOSIS — M35.3 PMR (POLYMYALGIA RHEUMATICA) (HCC): Primary | ICD-10-CM

## 2022-09-13 DIAGNOSIS — M35.3 PMR (POLYMYALGIA RHEUMATICA) (HCC): ICD-10-CM

## 2022-09-13 PROCEDURE — 1123F ACP DISCUSS/DSCN MKR DOCD: CPT | Performed by: INTERNAL MEDICINE

## 2022-09-13 PROCEDURE — G8399 PT W/DXA RESULTS DOCUMENT: HCPCS | Performed by: INTERNAL MEDICINE

## 2022-09-13 PROCEDURE — G8427 DOCREV CUR MEDS BY ELIG CLIN: HCPCS | Performed by: INTERNAL MEDICINE

## 2022-09-13 PROCEDURE — G8417 CALC BMI ABV UP PARAM F/U: HCPCS | Performed by: INTERNAL MEDICINE

## 2022-09-13 PROCEDURE — 1090F PRES/ABSN URINE INCON ASSESS: CPT | Performed by: INTERNAL MEDICINE

## 2022-09-13 PROCEDURE — 1036F TOBACCO NON-USER: CPT | Performed by: INTERNAL MEDICINE

## 2022-09-13 PROCEDURE — 99213 OFFICE O/P EST LOW 20 MIN: CPT | Performed by: INTERNAL MEDICINE

## 2022-09-13 NOTE — PROGRESS NOTES
Subjective:       Galilea Allen is a 68 y.o. female the patient returns for follow-up of polymyalgia rheumatica. She is currently on 2 mg of prednisone. Since I last saw her she was diagnosed with Mycobacterium Avium. She is tolerating the antibiotics prescribed. Review of Systems:    Denies symptoms of giant cell arteritis denies symptoms of polymyalgia rheumatica. She still has left hip discomfort. Objective:     /70   Pulse 80   Ht 5' 4\" (1.626 m)   BMI 31.24 kg/m²   Alert female in no acute distress. Temporal arteries 1+ without nodularity or tenderness. Assessment:    Polymyalgia  rheumatica  Plan:      A CRP will be checked if her CRP is within normal limits prednisone will be reduced.   I will see patient back in 8 weeks time        Patti Fairchild MD, MD, 9/13/2022 12:37 PM

## 2022-09-14 ENCOUNTER — TELEMEDICINE (OUTPATIENT)
Dept: INFECTIOUS DISEASES | Age: 77
End: 2022-09-14
Payer: MEDICARE

## 2022-09-14 DIAGNOSIS — I25.10 CORONARY ARTERY DISEASE INVOLVING NATIVE CORONARY ARTERY OF NATIVE HEART WITHOUT ANGINA PECTORIS: ICD-10-CM

## 2022-09-14 DIAGNOSIS — E78.2 MIXED HYPERLIPIDEMIA: ICD-10-CM

## 2022-09-14 DIAGNOSIS — J44.9 COPD, SEVERE (HCC): ICD-10-CM

## 2022-09-14 DIAGNOSIS — E11.42 DIABETIC PERIPHERAL NEUROPATHY (HCC): ICD-10-CM

## 2022-09-14 DIAGNOSIS — R06.02 SOB (SHORTNESS OF BREATH): ICD-10-CM

## 2022-09-14 DIAGNOSIS — J47.9 BRONCHIECTASIS WITHOUT COMPLICATION (HCC): ICD-10-CM

## 2022-09-14 DIAGNOSIS — I42.9 CARDIOMYOPATHY, UNSPECIFIED TYPE (HCC): ICD-10-CM

## 2022-09-14 DIAGNOSIS — I10 PRIMARY HYPERTENSION: ICD-10-CM

## 2022-09-14 DIAGNOSIS — L40.50 PSORIATIC ARTHRITIS (HCC): ICD-10-CM

## 2022-09-14 DIAGNOSIS — A31.0 MAI (MYCOBACTERIUM AVIUM-INTRACELLULARE) (HCC): Primary | ICD-10-CM

## 2022-09-14 LAB — C-REACTIVE PROTEIN: 12.5 MG/L (ref 0–5.1)

## 2022-09-14 PROCEDURE — G8399 PT W/DXA RESULTS DOCUMENT: HCPCS | Performed by: INTERNAL MEDICINE

## 2022-09-14 PROCEDURE — 1090F PRES/ABSN URINE INCON ASSESS: CPT | Performed by: INTERNAL MEDICINE

## 2022-09-14 PROCEDURE — 99214 OFFICE O/P EST MOD 30 MIN: CPT | Performed by: INTERNAL MEDICINE

## 2022-09-14 PROCEDURE — G8427 DOCREV CUR MEDS BY ELIG CLIN: HCPCS | Performed by: INTERNAL MEDICINE

## 2022-09-14 PROCEDURE — 1123F ACP DISCUSS/DSCN MKR DOCD: CPT | Performed by: INTERNAL MEDICINE

## 2022-09-14 RX ORDER — ZOLPIDEM TARTRATE 5 MG/1
5 TABLET ORAL NIGHTLY PRN
COMMUNITY

## 2022-09-14 ASSESSMENT — ENCOUNTER SYMPTOMS
COUGH: 0
SORE THROAT: 0
SINUS PRESSURE: 0
CONSTIPATION: 0
EYE REDNESS: 0
BACK PAIN: 0
SINUS PAIN: 0
NAUSEA: 0
SHORTNESS OF BREATH: 0
EYE DISCHARGE: 0
ABDOMINAL PAIN: 0
DIARRHEA: 0
WHEEZING: 0
RHINORRHEA: 0

## 2022-09-14 NOTE — PROGRESS NOTES
Infectious Diseases Oupatient Follow-up Note            Reason for Visit:               Follow-up for Mycobacterium avium lung infection  Primary Care Physician:  Robert Nina MD  History Obtained From:   Patient , Medical Records     CHIEF COMPLAINT:    Chief Complaint   Patient presents with    Follow-up     RADHA LYNCH HISTORY: Tiffanie Welch is a 68 y.o. pleasant female patient, who had an outpatient visit with me today for follow-up. History was obtained from chart review and the patient. The patient had a virtual clinic visit with me today for above mentioned complaints. The patient has severe COPD and follows up with Dr. Fatoumata pSangler with pulmonology as an outpatient. She had increasing shortness of breath and coughing issues and had a CT scan of the chest done couple of months ago that showed scattered tree-in-bud and nodular infiltrate that was worsening. The patient had a bronchoscopy with BAL done on 6/9/2022. She was found to have multifocal pneumonia with mucous plugging. BAL cultures came back positive for Mycobacterium avium. Since the patient had worsening respiratory symptoms and met ATS IDSA criteria for Mycobacterium avium lung infection treatment, I started her on oral rifampin, ethambutol and azithromycin on 8/9/2022 when she establish care with me. The patient had some diarrhea issues with antibiotics. I had recommended withholding antibiotics. Diarrhea resolved but patient was unwilling to start the antibiotics again. The patient had a follow-up video visit with me today      Past Medical History:   Past medical and surgical history was reviewed by me during this visit in detail.     Past Medical History:   Diagnosis Date    CAD (coronary artery disease)     Cancer (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 (CERVIX STATUS UNKNOWN)      MALIGNANT SKIN LESION EXCISION      OTHER SURGICAL HISTORY  01/03/2022    PCI left circumflex with KYLAH    OTHER SURGICAL HISTORY  02/2020    LAD stent    SHOULDER SURGERY  03/2013       Current Medications: All medications were reviewed by me during this visit  Current Outpatient Medications   Medication Sig Dispense Refill    zolpidem (AMBIEN) 5 MG tablet Take 5 mg by mouth nightly as needed for Sleep.      albuterol sulfate HFA (PROVENTIL HFA) 108 (90 Base) MCG/ACT inhaler Inhale 2 puffs into the lungs every 6 hours as needed for Wheezing 18 g 3    lidocaine (LIDODERM) 5 % Place 1 patch onto the skin daily as needed 12 hours on, 12 hours off.      predniSONE (DELTASONE) 1 MG tablet Take 2 mg by mouth daily      gabapentin (NEURONTIN) 100 MG capsule Take 100 mg by mouth 3 times daily.       oxyCODONE-acetaminophen (PERCOCET) 7.5-325 MG per tablet Take 1 tablet by mouth every 8 hours as needed for Pain.      meloxicam (MOBIC) 15 MG tablet TAKE 1 TABLET BY MOUTH  DAILY 90 tablet 3    clopidogrel (PLAVIX) 75 MG tablet Take 75 mg by mouth daily      aspirin 81 MG chewable tablet Take 81 mg by mouth daily      furosemide (LASIX) 40 MG tablet TAKE ONE TABLET BY MOUTH DAILY AS NEEDED 90 tablet 1    rosuvastatin (CRESTOR) 5 MG tablet TAKE 1 TABLET BY MOUTH  DAILY (Patient taking differently: Take 40 mg by mouth nightly) 90 tablet 3    ANORO ELLIPTA 62.5-25 MCG/INH AEPB inhaler USE 1 INHALATION BY MOUTH  DAILY (Patient taking differently: at bedtime) 3 each 3    carvedilol (COREG) 25 MG tablet TAKE ONE-HALF TABLET BY  MOUTH TWICE DAILY 90 tablet 3    losartan (COZAAR) 50 MG tablet TAKE 1 TABLET BY MOUTH  TWICE DAILY 180 fatigue. Negative for chills, diaphoresis and fever. HENT:  Negative for ear discharge, ear pain, postnasal drip, rhinorrhea, sinus pressure, sinus pain and sore throat. Eyes:  Negative for discharge and redness. Respiratory:  Negative for cough, shortness of breath and wheezing. Cardiovascular:  Negative for chest pain and leg swelling. Gastrointestinal:  Negative for abdominal pain, constipation, diarrhea and nausea. Endocrine: Negative for cold intolerance, heat intolerance and polydipsia. Genitourinary:  Negative for dysuria, flank pain, frequency, hematuria and urgency. Musculoskeletal:  Negative for back pain and myalgias. Skin:  Negative for rash. Allergic/Immunologic: Negative for immunocompromised state. Neurological:  Negative for dizziness, seizures and headaches. Hematological:  Does not bruise/bleed easily. Psychiatric/Behavioral:  Negative for agitation, hallucinations and suicidal ideas. The patient is not nervous/anxious. All other systems reviewed and are negative. PHYSICAL EXAM:      There were no vitals filed for this visit. Physical Exam  Vitals and nursing note reviewed. Constitutional:       Appearance: She is well-developed. She is not diaphoretic. Comments: The patient was seen earlier today. HENT:      Head: Normocephalic and atraumatic. Right Ear: External ear normal. There is no impacted cerumen. Left Ear: External ear normal. There is no impacted cerumen. Nose: Nose normal.      Mouth/Throat:      Mouth: Mucous membranes are moist.      Pharynx: Oropharynx is clear. No oropharyngeal exudate. Eyes:      General: No scleral icterus. Right eye: No discharge. Left eye: No discharge. Conjunctiva/sclera: Conjunctivae normal.      Pupils: Pupils are equal, round, and reactive to light. Neck:      Thyroid: No thyromegaly. Cardiovascular:      Rate and Rhythm: Normal rate and regular rhythm.       Heart sounds: Normal heart sounds. No murmur heard. No friction rub. Pulmonary:      Effort: No respiratory distress. Breath sounds: No stridor. No wheezing or rales. Abdominal:      General: Bowel sounds are normal.      Palpations: Abdomen is soft. Tenderness: There is no abdominal tenderness. There is no guarding or rebound. Musculoskeletal:         General: No swelling, tenderness or deformity. Normal range of motion. Cervical back: Normal range of motion and neck supple. Right lower leg: No edema. Left lower leg: No edema. Lymphadenopathy:      Cervical: No cervical adenopathy. Skin:     General: Skin is warm and dry. Coloration: Skin is not jaundiced. Findings: No bruising, erythema or rash. Neurological:      General: No focal deficit present. Mental Status: She is alert and oriented to person, place, and time. Mental status is at baseline. Motor: No abnormal muscle tone.    Psychiatric:         Mood and Affect: Mood normal.         Behavior: Behavior normal.            DATA:  All available lab data wasreviewed by me during this visit    CBC:  Lab Results   Component Value Date    WBC 7.2 08/09/2022    HGB 10.6 (L) 08/09/2022    HCT 33.7 (L) 08/09/2022    MCV 99.0 08/09/2022     08/09/2022    LYMPHOPCT 13.2 08/09/2022    RBC 3.41 (L) 08/09/2022    MCH 31.2 08/09/2022    MCHC 31.5 08/09/2022    RDW 17.1 (H) 08/09/2022       Last BMP:  Lab Results   Component Value Date/Time     08/09/2022 12:25 PM    K 5.1 08/09/2022 12:25 PM    CL 99 08/09/2022 12:25 PM    CO2 27 08/09/2022 12:25 PM    BUN 13 08/09/2022 12:25 PM    CREATININE 0.9 08/09/2022 12:25 PM    GLUCOSE 225 08/09/2022 12:25 PM    CALCIUM 9.2 08/09/2022 12:25 PM        Hepatic Function Panel:  Lab Results   Component Value Date/Time    ALKPHOS 209 08/09/2022 12:25 PM    ALT 26 08/09/2022 12:25 PM    AST 46 08/09/2022 12:25 PM    PROT 5.9 08/09/2022 12:25 PM    PROT 7.4 07/05/2011 01:45 AM BILITOT 0.5 08/09/2022 12:25 PM    BILIDIR <0.2 06/30/2021 01:41 PM    IBILI see below 06/30/2021 01:41 PM    LABALBU 3.4 08/09/2022 12:25 PM       Last CK: No results found for: CKTOTAL    Last ESR:  No results found for: SEDRATE    Last CRP:  Lab Results   Component Value Date    CRP 12.5 (H) 09/13/2022         Imaging: All pertinent images and reports for the current visit were reviewed by me during this visit. I reviewed the chest x-ray/CT scan/MRI images and independently interpreted the findings and results today. Outside records:    Labs, Microbiology,Radiology and pertinent results from Care everywhere, if available, were reviewed as a part of the consultation. Problem list:       Patient Active Problem List   Diagnosis Code    HTN (hypertension) I10    Hyperlipidemia E78.5    Cardiomyopathy ([facility] I42.9    CAD (coronary artery disease) I25.10    COPD, severe ([facility]) J44.9    Psoriatic arthritis ([facility] L40.50    Diabetic peripheral neuropathy ([facility]) E11.42    SOB (shortness of breath) R06.02    Unstable angina ([facility]) I20.0    Abnormal stress test R94.39    Bronchiectasis without complication ([facility] O29.1       Microbiology:       All available micro data was reviewed by me during this visit    BAL culture  - collected on 6/9/2022: Mycobacterium avium                Allergies and immunizations:       Known drug Allergies: All allergies data was reviewed by me during this visit  No known allergies and Statins    Immunizations: All immunizations were reviewed byme in detail.      Immunization History   Administered Date(s) Administered    COVID-19, MODERNA BLUE border, Primary or Immunocompromised, (age 12y+), IM, 100 mcg/0.5mL 03/04/2021, 04/01/2021, 12/19/2021    Influenza 10/05/2011, 10/09/2012    Influenza Vaccine, unspecified formulation 09/29/2014    Influenza Virus Vaccine 11/01/2017    Pneumococcal Conjugate 13-valent (Indejlt33) 08/03/2015    Pneumococcal Polysaccharide (Ugdlohqhb20) 01/31/2015 SocialHistory:  All social and epidemiologic history was reviewed and updated be me in detail today. Social History     Socioeconomic History    Marital status:    Tobacco Use    Smoking status: Former     Packs/day: 0.50     Years: 35.00     Pack years: 17.50     Types: Cigarettes     Quit date: 1991     Years since quittin.2    Smokeless tobacco: Never    Tobacco comments:     started to smoke at 21 / smoked up to 0.5 ppd for 35 yrs off and  on /    Vaping Use    Vaping Use: Never used   Substance and Sexual Activity    Alcohol use: Yes     Alcohol/week: 1.0 - 2.0 standard drink     Types: 1 - 2 Glasses of wine per week     Comment: once or twice a year     Drug use: No    Sexual activity: Yes     Partners: Male       COVID VACCINATION AND LAB RESULT RECORDS:     Internal Administration   First Dose COVID-19, MODERNA BLUE border, Primary or Immunocompromised, (age 12y+), IM, 100 mcg/0.5mL  2021   Second Dose COVID-19, MODERNA BLUE border, Primary or Immunocompromised, (age 12y+), IM, 100 mcg/0.5mL   2021       Last COVID Lab SARS-CoV-2 (no units)   Date Value   2020 Not Detected            IMPRESSION:    The patient is a 68 y.o. old female who  has a past medical history of CAD (coronary artery disease), Cancer ([address] (), Cardiomyopathy ([address] CHF (congestive heart failure) ([address] Hyperlipidemia, Hypertension, Hypothyroidism, NSTEMI (non-ST elevated myocardial infarction) ([address] (2022), and Psoriasis. was seen today for following problems:    1. RADHA (mycobacterium avium-intracellulare) ([address]    2. COPD, severe ([address]    3. Coronary artery disease involving native coronary artery of native heart without angina pectoris    4. Diabetic peripheral neuropathy (HCC)    5. Cardiomyopathy, unspecified type ([address]    6. Bronchiectasis without complication ([address]    7. Mixed hyperlipidemia    8. Primary hypertension    9. Psoriatic arthritis ([address]    10.  SOB (shortness of breath) Discussion:      The patient has been afebrile. I had a long discussion with the patient today. The patient did not tolerate the antibiotic regimen for Mycobacterium avium complex well at all. She started having anxiety issues and sleeplessness and diarrhea and does not want to deal with that again    I explained to her that Mycobacterium avium is a longstanding chronic infection and the goal of the treatment is typically symptom improvement. That treatment for Mycobacterium avium is given for 1 year to 1-1/2-year. Typical treatment regimens include medications like rifampin, azithromycin or clarithromycin, ethambutol that I gave to the patient initially. Alternatives include medications like levofloxacin or moxifloxacin, long-term Bactrim or minocycline, linezolid etc. or IV antibiotics like IV amikacin      RECOMMENDATIONS:      Diagnostic Workup:      Continue to follow fever curve  at home      Antimicrobials:    I discussed multiple antibiotic options of Mycobacterium avium and the possible side effects and the need to take them long-term for 1 to 1-1/2-year  I also explained to the patient that Mycobacterium avium infection is quite difficult eradicate from the lungs and goal of treatment is typically symptom improvement  The patient tells me that her lung symptoms are not bad to begin with. She does not have any shortness of breath at rest, does not require any oxygen, denies any long-term coughing or low-grade fevers or unexplained loss of weight or loss of appetite  After long discussion with the patient, we think that the side effects of antibiotics may be more troublesome for the patient then the Mycobacterium avium infection itself at this time  The patient would like to take a conservative approach and just follow-up with the pulmonology as an outpatient to monitor her lung function and forego treatment of Mycobacterium avium at this time.   Discussed with patient the strategies to stay safe from COVID 19 exposures including safe social distancing, frequent and proper hand hygiene when outside and using 3 layered mouth and nose coverings/facemasks when outside their home. Discussed all above in detail with patient's daughter as well, who was present during the video visit and help with the appointment        Patient education and counseling: The patient was educated in detail about the side-effects of various antibiotics and things to watch for like new rashes, lip swelling, severereaction, worsening diarrhea, break through fever etc.  Patient was instructed to stop antibiotics and call our office if these problems were to occur, or go to nearest ER ifexperiencing severe symptoms. Discussed patient's condition and what to expect. All of the patient's questions were addressed in a satisfactory manner and patient verbalizedunderstanding all instructions. Follow-up:    Follow-up with me in ID clinic or through video visit PRN    Level of complexity of visit and medical decision making: High Adela Costa is a 68 y.o. female being evaluated by a Virtual Visit encounter to address concerns as mentioned above. A caregiver was present when appropriate. Due to this being a TeleHealth encounter (During XRA-81 public health emergency), evaluation of the following organ systems was limited: Vitals/Constitutional/EENT/Resp/CV/GI//MS/Neuro/Skin/Heme-Lymph-Imm. Pursuant to the emergency declaration under the [facility] [place] [facility], [address] [place] [address] authority and the [employer] and [employer] Act, this Virtual Visit was conducted with patient's (and/or legal guardian's) consent, to reduce the patient's risk of exposure to COVID-19 and provide necessary medical care.   The patient (and/or legal guardian) has also been advised to contact this office for worsening conditions or problems, and seek emergency medical treatment and/or call 911 if deemed necessary. Services were provided through an audio and/or video synchronous discussion virtually to substitute for in-person clinic visit. Patient and provider were located at their individual homes. --Maxi Shirley MD on 9/14/2022 at 12:00 PM         An electronic signature was used to authenticate this note. TIME SPENT TODAY:    - Spent over 31 minutes on visit (including interval history, physical exam, review of data including labs,cultures, imaging, development and implementation of treatment plan and coordination of care). - Over 50% of face-to-face time spent with pt counseling andeducation. Please note that this chart was generated using Dragon dictation software. Although every effort was made to ensure the accuracy of this automated transcription, some errors in transcription may have occurred inadvertently. If you may need any clarification, please do not hesitate to contact me through EPIC or at the phone number provided below with my electronic signature. Any pictures or media included in this note were obtained after taking informed verbal consent from the patient and with their approval to include those in the patient's medical record. Thankyou for involving me inthe care of your patient. If you have any additional questions, please do not hesitate to contact me. Maxi Shirley MD, MPH, [address] [place] [address]  9/14/2022, 12:00 PM  [facility] Infectious Disease   [address] [place] [address], [address] [place] [address]  Office: [phone]  Fax: [phone]  In-person Clinic days:  Tuesday & Thursday a.m. Virtual clinic days: Monday, Wednesday & Friday a.m.

## 2022-11-04 ENCOUNTER — OFFICE VISIT (OUTPATIENT)
Dept: PULMONOLOGY | Age: 77
End: 2022-11-04
Payer: MEDICARE

## 2022-11-04 VITALS — HEART RATE: 74 BPM | OXYGEN SATURATION: 94 %

## 2022-11-04 DIAGNOSIS — J44.9 COPD, SEVERE (HCC): Primary | ICD-10-CM

## 2022-11-04 DIAGNOSIS — I42.9 CARDIOMYOPATHY, UNSPECIFIED TYPE (HCC): Chronic | ICD-10-CM

## 2022-11-04 DIAGNOSIS — J47.9 BRONCHIECTASIS WITHOUT COMPLICATION (HCC): ICD-10-CM

## 2022-11-04 DIAGNOSIS — A31.0 MAI (MYCOBACTERIUM AVIUM-INTRACELLULARE) (HCC): ICD-10-CM

## 2022-11-04 PROCEDURE — 3023F SPIROM DOC REV: CPT | Performed by: INTERNAL MEDICINE

## 2022-11-04 PROCEDURE — 1036F TOBACCO NON-USER: CPT | Performed by: INTERNAL MEDICINE

## 2022-11-04 PROCEDURE — 90694 VACC AIIV4 NO PRSRV 0.5ML IM: CPT | Performed by: INTERNAL MEDICINE

## 2022-11-04 PROCEDURE — 99214 OFFICE O/P EST MOD 30 MIN: CPT | Performed by: INTERNAL MEDICINE

## 2022-11-04 PROCEDURE — 1123F ACP DISCUSS/DSCN MKR DOCD: CPT | Performed by: INTERNAL MEDICINE

## 2022-11-04 PROCEDURE — G8484 FLU IMMUNIZE NO ADMIN: HCPCS | Performed by: INTERNAL MEDICINE

## 2022-11-04 PROCEDURE — G8427 DOCREV CUR MEDS BY ELIG CLIN: HCPCS | Performed by: INTERNAL MEDICINE

## 2022-11-04 PROCEDURE — G8417 CALC BMI ABV UP PARAM F/U: HCPCS | Performed by: INTERNAL MEDICINE

## 2022-11-04 PROCEDURE — 1090F PRES/ABSN URINE INCON ASSESS: CPT | Performed by: INTERNAL MEDICINE

## 2022-11-04 PROCEDURE — G8399 PT W/DXA RESULTS DOCUMENT: HCPCS | Performed by: INTERNAL MEDICINE

## 2022-11-04 PROCEDURE — G0008 ADMIN INFLUENZA VIRUS VAC: HCPCS | Performed by: INTERNAL MEDICINE

## 2022-11-04 NOTE — PROGRESS NOTES
[facility] [address]  Progress Note  MD Cherise Philippetina Marty   YOB: 1945    Date of Visit:  11/4/2022    Assessment/Plan:  1. RADHA  Carroll did show M Avium  She did not tolerate the medication however  She is SOB with almost any activity. 2. COPD, severe ([address]  PFT 11/2020:  Spirometry reveals decreased FVC at 1.78 liters, which is 63% predicted. FEV1 is decreased at 0.94 liters, which is 44% predicted. FEV1/FVC  ratio is decreased at 53%. Expiratory flow rates are decreased. There  is no postbronchodilator study. Lung volumes reveal normal total lung  capacity, vital capacity is reduced at 66% predicted, residual volume is  increased at 172% predicted, diffusion capacity is decreased at 48%  predicted. In comparison to a study from 03/2018, FVC has decreased by  15%, FEV1 has decreased by 10%, diffusion capacity has decreased by 17%. IMPRESSION:  Severe obstructive lung disease with air trapping. This is  worse in comparison to the previous study. Continue Anoro  I have independently reviewed pulmonary function testing. Recent testing shows severe disease and worsening hyperinflation. She has a bad hip and has been told she needs replacement but she is afraid of the risk. 3. Cardiomyopathy ([address]  Recent hospitalization at [address] [place] [address] is \"better\" sees Cardiology at [facility]    4. Bronchiectasis  Noted on CT    Flu shot given      FOLLOW UP: 6 months      Chief Complaint   Patient presents with    Shortness of Breath     Could not tolerate the medications Dr Agatha Hughes had her on so medications were stopped and pt instructed by Dr Agatha Hughes to follow back up with Dr Jaron Rosado        HPI  The patient presents with a chief complaint of shortness of breath and cough. She is known to have COPD which is severe. She was diagnosed with Cedric Jimenez. Dr Agatha Hughes had her on treatment but she did not tolerate the medication and had to stop them after two weeks. Review of Systems  No Chest pain, Nausea or vomiting reported      Physical Exam:  Well developed, well nourished  Alert and oriented  Sclera is clear  No cervical adenopathy  No JVD. Chest examination is clear. Cardiac examination reveals regular rate and rhythm without murmur, gallop or rub. The abdomen is soft, nontender and nondistended. There is no clubbing, cyanosis or edema of the extremities. There is no obvious skin rash. No focal neuro deficicts  Normal mood and affect    Allergies   Allergen Reactions    No Known Allergies     Statins Other (See Comments)     Joint pain , muscle aches     Prior to Visit Medications    Medication Sig Taking? Authorizing Provider   zolpidem (AMBIEN) 5 MG tablet Take 5 mg by mouth nightly as needed for Sleep. Historical Provider, MD   rifAMPin (RIFADIN) 300 MG capsule Take 1 capsule by mouth in the morning and 1 capsule before bedtime. Patient not taking: No sig reported  Berto Guzman MD   ethambutol (MYAMBUTOL) 400 MG tablet Take 3 tablets by mouth in the morning. Patient not taking: Reported on 9/14/2022  Berto Guzman MD   azithromycin (ZITHROMAX) 500 MG tablet Take 1 tablet by mouth in the morning. Patient not taking: No sig reported  Berto Guzman MD   Probiotic Acidophilus (FLORANEX) TABS Take 1 tablet by mouth in the morning and 1 tablet before bedtime. Patient not taking: Reported on 9/14/2022  Berto Guzman MD   albuterol sulfate HFA (PROVENTIL HFA) 108 (90 Base) MCG/ACT inhaler Inhale 2 puffs into the lungs every 6 hours as needed for Wheezing  BRIANNE Martin - CNP   lidocaine (LIDODERM) 5 % Place 1 patch onto the skin daily as needed 12 hours on, 12 hours off. Historical Provider, MD   predniSONE (DELTASONE) 1 MG tablet Take 2 mg by mouth daily  Historical Provider, MD   gabapentin (NEURONTIN) 100 MG capsule Take 100 mg by mouth 3 times daily.   Historical Provider, MD   oxyCODONE-acetaminophen (PERCOCET) 7.5-325 MG per tablet Provider, MD       Vitals:    22 1334   Pulse: 74   SpO2: 94%     There is no height or weight on file to calculate BMI.      Wt Readings from Last 3 Encounters:   22 182 lb (82.6 kg)   22 181 lb (82.1 kg)   22 183 lb (83 kg)     BP Readings from Last 3 Encounters:   22 120/70   22 (!) 97/59   22 134/76        Social History     Tobacco Use   Smoking Status Former    Packs/day: 0.50    Years: 35.00    Pack years: 17.50    Types: Cigarettes    Quit date: 1991    Years since quittin.3   Smokeless Tobacco Never   Tobacco Comments    started to smoke at 20 / smoked up to 0.5 ppd for 35 yrs off and  on /

## 2022-11-08 ENCOUNTER — OFFICE VISIT (OUTPATIENT)
Dept: RHEUMATOLOGY | Age: 77
End: 2022-11-08
Payer: MEDICARE

## 2022-11-08 VITALS
HEIGHT: 64 IN | SYSTOLIC BLOOD PRESSURE: 124 MMHG | DIASTOLIC BLOOD PRESSURE: 70 MMHG | BODY MASS INDEX: 29.19 KG/M2 | WEIGHT: 171 LBS | HEART RATE: 64 BPM

## 2022-11-08 DIAGNOSIS — M25.552 HIP PAIN, CHRONIC, LEFT: ICD-10-CM

## 2022-11-08 DIAGNOSIS — M35.3 PMR (POLYMYALGIA RHEUMATICA) (HCC): ICD-10-CM

## 2022-11-08 DIAGNOSIS — M35.3 PMR (POLYMYALGIA RHEUMATICA) (HCC): Primary | ICD-10-CM

## 2022-11-08 DIAGNOSIS — G89.29 HIP PAIN, CHRONIC, LEFT: ICD-10-CM

## 2022-11-08 DIAGNOSIS — Z79.899 ENCOUNTER FOR LONG-TERM (CURRENT) USE OF HIGH-RISK MEDICATION: ICD-10-CM

## 2022-11-08 LAB — C-REACTIVE PROTEIN: 28.2 MG/L (ref 0–5.1)

## 2022-11-08 PROCEDURE — G8427 DOCREV CUR MEDS BY ELIG CLIN: HCPCS | Performed by: INTERNAL MEDICINE

## 2022-11-08 PROCEDURE — G8484 FLU IMMUNIZE NO ADMIN: HCPCS | Performed by: INTERNAL MEDICINE

## 2022-11-08 PROCEDURE — 99213 OFFICE O/P EST LOW 20 MIN: CPT | Performed by: INTERNAL MEDICINE

## 2022-11-08 PROCEDURE — G8417 CALC BMI ABV UP PARAM F/U: HCPCS | Performed by: INTERNAL MEDICINE

## 2022-11-08 PROCEDURE — 3078F DIAST BP <80 MM HG: CPT | Performed by: INTERNAL MEDICINE

## 2022-11-08 PROCEDURE — 1090F PRES/ABSN URINE INCON ASSESS: CPT | Performed by: INTERNAL MEDICINE

## 2022-11-08 PROCEDURE — G8399 PT W/DXA RESULTS DOCUMENT: HCPCS | Performed by: INTERNAL MEDICINE

## 2022-11-08 PROCEDURE — 3074F SYST BP LT 130 MM HG: CPT | Performed by: INTERNAL MEDICINE

## 2022-11-08 PROCEDURE — 1036F TOBACCO NON-USER: CPT | Performed by: INTERNAL MEDICINE

## 2022-11-08 PROCEDURE — 1123F ACP DISCUSS/DSCN MKR DOCD: CPT | Performed by: INTERNAL MEDICINE

## 2022-11-08 NOTE — PROGRESS NOTES
subjective       Debra Stage is a 68 y.o. female   Patient returns for follow-up of polymyalgia rheumatica. Her last CPKs have increased from normal back to  12.5. She is currently on prednisone 2 mg a day. Review of Systems:    Denies cough denies fever denies symptoms of giant cell arteritis denies symptoms of polymyalgia    Objective:     /70   Pulse 64   Ht 5' 4\" (1.626 m)   Wt 171 lb (77.6 kg)   BMI 29.35 kg/m²   Alert female in no acute distress temporal arteries 1+. Without nodularity or tenderness. No peripheral synovitis  Assessment:      pmr    Plan:    A CRP will be checked and depending on the results prednisone dose will be adjusted. Her pulmonary physician will be contacted. I will see her back in 2 months time.         Payal Fatima MD, MD, 11/8/2022 2:14 PM

## 2022-11-09 ENCOUNTER — TELEPHONE (OUTPATIENT)
Dept: PULMONOLOGY | Age: 77
End: 2022-11-09

## 2022-11-09 NOTE — TELEPHONE ENCOUNTER
Dr. Aislinn Rodriguez office called and wanted to discuss with Dr. Denis Mcrae about patient treatment.      BI:[phone]

## 2022-11-15 RX ORDER — ROSUVASTATIN CALCIUM 5 MG/1
5 TABLET, COATED ORAL DAILY
Qty: 90 TABLET | Refills: 3 | OUTPATIENT
Start: 2022-11-15

## 2022-11-22 RX ORDER — PREDNISONE 1 MG/1
TABLET ORAL
Qty: 120 TABLET | Refills: 0 | Status: SHIPPED | OUTPATIENT
Start: 2022-11-22

## 2022-12-15 RX ORDER — PREDNISONE 1 MG/1
TABLET ORAL
Qty: 120 TABLET | Refills: 0 | Status: SHIPPED | OUTPATIENT
Start: 2022-12-15

## 2022-12-15 NOTE — TELEPHONE ENCOUNTER
Last visit  11/8/22  Lab 11/8/22 C-Reactive Protein   Next visit  1/10/23  Last refill 11/22/22  #[id number]

## 2023-02-09 ENCOUNTER — OFFICE VISIT (OUTPATIENT)
Dept: RHEUMATOLOGY | Age: 78
End: 2023-02-09
Payer: MEDICARE

## 2023-02-09 VITALS
HEIGHT: 64 IN | SYSTOLIC BLOOD PRESSURE: 122 MMHG | WEIGHT: 167 LBS | BODY MASS INDEX: 28.51 KG/M2 | DIASTOLIC BLOOD PRESSURE: 78 MMHG | HEART RATE: 80 BPM

## 2023-02-09 DIAGNOSIS — M35.3 PMR (POLYMYALGIA RHEUMATICA) (HCC): Primary | ICD-10-CM

## 2023-02-09 PROCEDURE — 1036F TOBACCO NON-USER: CPT | Performed by: INTERNAL MEDICINE

## 2023-02-09 PROCEDURE — G8417 CALC BMI ABV UP PARAM F/U: HCPCS | Performed by: INTERNAL MEDICINE

## 2023-02-09 PROCEDURE — 3074F SYST BP LT 130 MM HG: CPT | Performed by: INTERNAL MEDICINE

## 2023-02-09 PROCEDURE — 1123F ACP DISCUSS/DSCN MKR DOCD: CPT | Performed by: INTERNAL MEDICINE

## 2023-02-09 PROCEDURE — 99213 OFFICE O/P EST LOW 20 MIN: CPT | Performed by: INTERNAL MEDICINE

## 2023-02-09 PROCEDURE — G8427 DOCREV CUR MEDS BY ELIG CLIN: HCPCS | Performed by: INTERNAL MEDICINE

## 2023-02-09 PROCEDURE — G8484 FLU IMMUNIZE NO ADMIN: HCPCS | Performed by: INTERNAL MEDICINE

## 2023-02-09 PROCEDURE — 3078F DIAST BP <80 MM HG: CPT | Performed by: INTERNAL MEDICINE

## 2023-02-09 PROCEDURE — G8399 PT W/DXA RESULTS DOCUMENT: HCPCS | Performed by: INTERNAL MEDICINE

## 2023-02-09 PROCEDURE — 1090F PRES/ABSN URINE INCON ASSESS: CPT | Performed by: INTERNAL MEDICINE

## 2023-02-09 NOTE — PROGRESS NOTES
Subjective:       Saulo Dasilva is a 68 y.o. female   The patient returns for follow-up of polymyalgia rheumatica. Patient has been briefly treated for Mycobacterium AVM but could not tolerate the medication. She is still thinking about having total joint replacement for her hip. Her last CRP is elevated    Review of Systems:      Denies symptoms of giant cell arteritis denies symptoms of polymyalgia. Objective:       /78   Pulse 80   Ht 5' 4\" (1.626 m)   Wt 167 lb (75.8 kg)   BMI 28.67 kg/m²   Alert female in no acute distress temporal arteries 1+ without nodularity or tenderness. Assessment:      Polymyalgia rheumatica    Plan:      A CRP will be checked. Prednisone will be reduced. Most likely her CRP remains elevated for other reasons particularly her ongoing lung issues. I will see her back in 6 weeks time.         Blair Rivera MD, MD, 2/9/2023 2:58 PM

## 2023-02-13 ENCOUNTER — TELEPHONE (OUTPATIENT)
Dept: RHEUMATOLOGY | Age: 78
End: 2023-02-13

## 2023-03-13 RX ORDER — PREDNISONE 1 MG/1
3 TABLET ORAL DAILY
Qty: 270 TABLET | Refills: 0 | Status: SHIPPED | OUTPATIENT
Start: 2023-03-13

## 2023-03-26 DIAGNOSIS — Z51.81 ENCOUNTER FOR THERAPEUTIC DRUG MONITORING: ICD-10-CM

## 2023-03-26 DIAGNOSIS — Z79.899 ENCOUNTER FOR LONG-TERM (CURRENT) USE OF HIGH-RISK MEDICATION: ICD-10-CM

## 2023-03-26 DIAGNOSIS — M35.3 POLYMYALGIA RHEUMATICA (HCC): ICD-10-CM

## 2023-03-28 RX ORDER — MELOXICAM 15 MG/1
TABLET ORAL
Qty: 90 TABLET | Refills: 0 | Status: SHIPPED | OUTPATIENT
Start: 2023-03-28

## 2023-03-28 NOTE — TELEPHONE ENCOUNTER
Pt having hip surgery 3/21/23 and will be calling to scheduled f/u with Dr Veronica Bailey as discussed at her appointment on 2/9/23.

## 2023-05-09 ENCOUNTER — OFFICE VISIT (OUTPATIENT)
Dept: PULMONOLOGY | Age: 78
End: 2023-05-09
Payer: MEDICARE

## 2023-05-09 VITALS — OXYGEN SATURATION: 93 % | HEART RATE: 81 BPM

## 2023-05-09 DIAGNOSIS — J47.9 BRONCHIECTASIS WITHOUT COMPLICATION (HCC): ICD-10-CM

## 2023-05-09 DIAGNOSIS — A31.0 MAI (MYCOBACTERIUM AVIUM-INTRACELLULARE) (HCC): Primary | ICD-10-CM

## 2023-05-09 DIAGNOSIS — I42.9 CARDIOMYOPATHY, UNSPECIFIED TYPE (HCC): Chronic | ICD-10-CM

## 2023-05-09 DIAGNOSIS — J44.9 COPD, SEVERE (HCC): ICD-10-CM

## 2023-05-09 PROCEDURE — 99214 OFFICE O/P EST MOD 30 MIN: CPT | Performed by: INTERNAL MEDICINE

## 2023-05-09 PROCEDURE — 3023F SPIROM DOC REV: CPT | Performed by: INTERNAL MEDICINE

## 2023-05-09 PROCEDURE — G8427 DOCREV CUR MEDS BY ELIG CLIN: HCPCS | Performed by: INTERNAL MEDICINE

## 2023-05-09 PROCEDURE — 1123F ACP DISCUSS/DSCN MKR DOCD: CPT | Performed by: INTERNAL MEDICINE

## 2023-05-09 PROCEDURE — G8399 PT W/DXA RESULTS DOCUMENT: HCPCS | Performed by: INTERNAL MEDICINE

## 2023-05-09 PROCEDURE — 1036F TOBACCO NON-USER: CPT | Performed by: INTERNAL MEDICINE

## 2023-05-09 PROCEDURE — G8417 CALC BMI ABV UP PARAM F/U: HCPCS | Performed by: INTERNAL MEDICINE

## 2023-05-09 PROCEDURE — 1090F PRES/ABSN URINE INCON ASSESS: CPT | Performed by: INTERNAL MEDICINE

## 2023-05-09 RX ORDER — IPRATROPIUM BROMIDE AND ALBUTEROL SULFATE 2.5; .5 MG/3ML; MG/3ML
1 SOLUTION RESPIRATORY (INHALATION) EVERY 6 HOURS PRN
Qty: 360 ML | Refills: 11 | Status: SHIPPED | OUTPATIENT
Start: 2023-05-09

## 2023-05-09 NOTE — PROGRESS NOTES
[facility]  Progress Note  Ever Luna MD       Elpidio Yon   YOB: 1945    Date of Visit:  5/9/2023    Assessment/Plan:  1. 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 Repeat CT chest    2. COPD, severe ([address]  PFT 11/2020:  Spirometry reveals decreased FVC at 1.78 liters, which is 63% predicted. FEV1 is decreased at 0.94 liters, which is 44% predicted. FEV1/FVC  ratio is decreased at 53%. Expiratory flow rates are decreased. There  is no postbronchodilator study. Lung volumes reveal normal total lung  capacity, vital capacity is reduced at 66% predicted, residual volume is  increased at 172% predicted, diffusion capacity is decreased at 48%  predicted. In comparison to a study from 03/2018, FVC has decreased by  15%, FEV1 has decreased by 10%, diffusion capacity has decreased by 17%. IMPRESSION:  Severe obstructive lung disease with air trapping. This is  worse in comparison to the previous study. SOB with any activity     Medication Management:  Anoro ==> Trelegy  She would benefit from [facility] at home  Start Duoneb QAM and prn  Also add Acapella      3. Cardiomyopathy [facility])  Recent hospitalization at [address] [place] [address] is \"better\" sees Cardiology at [facility]    4. Bronchiectasis  Repeat CT        Flu shot UTD      FOLLOW UP: 6 months      Chief Complaint   Patient presents with    Shortness of Breath     Was in patient 12-26-22 to 12-28-22 at [facility] for Pneumonia. Had Hip Replacement in March HPI  The patient presents with a chief complaint of shortness of breath and cough. She is known to have COPD which is severe. She was diagnosed with Carmelo Walker. Dr Fabian Peck had her on treatment but she did not tolerate the medication and had to stop them after two weeks. She had hip surgery and that went well. Pain has resolved. She still is SOB with exertion, similar to previous.     Review of

## 2023-05-10 ENCOUNTER — TELEPHONE (OUTPATIENT)
Dept: PULMONOLOGY | Age: 78
End: 2023-05-10

## 2023-05-10 RX ORDER — PREDNISONE 1 MG/1
TABLET ORAL
Qty: 30 TABLET | OUTPATIENT
Start: 2023-05-10

## 2023-05-11 NOTE — TELEPHONE ENCOUNTER
Called pt back and went over instructions with her again. She also has her information sheet we gave yesterday as well.

## 2023-06-19 RX ORDER — PREDNISONE 1 MG/1
3 TABLET ORAL DAILY
Qty: 270 TABLET | Refills: 0 | OUTPATIENT
Start: 2023-06-19

## 2023-06-19 NOTE — TELEPHONE ENCOUNTER
Giuseppe Porter is available at his new location to address any patient needs. Directed pharmacy to reach out to the new location for regarding this refill.

## 2023-06-22 DIAGNOSIS — Z79.899 ENCOUNTER FOR LONG-TERM (CURRENT) USE OF HIGH-RISK MEDICATION: ICD-10-CM

## 2023-06-22 DIAGNOSIS — M35.3 POLYMYALGIA RHEUMATICA (HCC): ICD-10-CM

## 2023-06-22 DIAGNOSIS — Z51.81 ENCOUNTER FOR THERAPEUTIC DRUG MONITORING: ICD-10-CM

## 2023-06-22 RX ORDER — MELOXICAM 15 MG/1
TABLET ORAL
Qty: 90 TABLET | Refills: 3 | OUTPATIENT
Start: 2023-06-22

## 2023-06-22 NOTE — TELEPHONE ENCOUNTER
Loree Mendoza is available at his new location to address any patient needs. Directed pharmacy to reach out to the new location for regarding this refill.   [address] [place] [facility]  Clinic:Tuesday and Thursday     [address], [address]  Clinic: Wednesday      Office ph# [phone]  Office Fax# [phone]

## 2023-06-23 ENCOUNTER — HOSPITAL ENCOUNTER (OUTPATIENT)
Dept: CT IMAGING | Age: 78
Discharge: HOME | End: 2023-06-23
Attending: INTERNAL MEDICINE
Payer: MEDICARE

## 2023-06-23 DIAGNOSIS — A31.0 MAI (MYCOBACTERIUM AVIUM-INTRACELLULARE) (HCC): ICD-10-CM

## 2023-06-23 DIAGNOSIS — J47.9 BRONCHIECTASIS WITHOUT COMPLICATION (HCC): ICD-10-CM

## 2023-06-23 PROCEDURE — 71250 CT THORAX DX C-: CPT

## 2023-08-09 LAB — C-REACTIVE PROTEIN: 24.8 MG/L (ref 1–10)

## 2024-04-30 NOTE — TELEPHONE ENCOUNTER
Appt scheduled.
If diarrhea has subsided, I would recommend starting the same antibiotics at this time. Recommend taking oral probiotic twice daily. She should let us know if diarrhea recurs.
MIGUEOM requesting call back to schedule appt
Patient called in stating she was in [facility] over the weekend due to increased anxiety and nausea. She has not taken her antibiotics since 8-18 and still continues to have symptoms. Diarrhea has subsided so she is wondering if she should still do the cdiff test? She feels these are side effects of the antibiotics and would like to know if you can take a different type or if its okay to stay off of them.
Please schedule her for a follow-up appointment in coming weeks based on availability to discuss her concerns. Mycobacterium avium is a very slow-growing infection and it is okay or her to stay off antibiotics for few weeks until next appointment. Thanks.
Spoke with patient and advised of MD note. She is not willing to take the same antibiotics at this time as she developed anxiety and insomnia when she took it last time. She is asking if there are alternative antibiotics she can take and if not what the consequences of not taking the antibiotics are?   Thank you
Yes, I would recommend getting stool C. difficile test done if there is continued diarrhea before reconsidering antibiotics. As mentioned before, unfortunately treatment for Mycobacterium avium involves multiple antibiotics that can give antibiotic induced diarrhea as well as C. difficile. Thanks.
Warm

## (undated) DEVICE — SYRINGE MED 50ML LUERLOCK TIP

## (undated) DEVICE — ENDOSCOPIC KIT 6X3/16 FT COLON W/ 1.1 OZ 2 GWN W/O BRSH

## (undated) DEVICE — GOWN AURORA NONREINF LG: Brand: MEDLINE INDUSTRIES, INC.

## (undated) DEVICE — SYRINGE MED 10ML POLYPR LUERSLIP TIP FLAT TOP W/O SFTY DISP

## (undated) DEVICE — SPECIMEN TRAP: Brand: ARGYLE

## (undated) DEVICE — CONMED CHANNEL MASTER PULMONARY AND PEDIATRIC CLEANING BRUSH, 160 CM X 2.0 MM: Brand: CONMED

## (undated) DEVICE — SINGLE USE SUCTION VALVE MAJ-209: Brand: SINGLE USE SUCTION VALVE (STERILE)

## (undated) DEVICE — SINGLE USE BIOPSY VALVE MAJ-210: Brand: SINGLE USE BIOPSY VALVE (STERILE)